# Patient Record
Sex: MALE | Race: WHITE | NOT HISPANIC OR LATINO | Employment: OTHER | URBAN - METROPOLITAN AREA
[De-identification: names, ages, dates, MRNs, and addresses within clinical notes are randomized per-mention and may not be internally consistent; named-entity substitution may affect disease eponyms.]

---

## 2017-01-25 ENCOUNTER — HOSPITAL ENCOUNTER (OUTPATIENT)
Dept: RADIOLOGY | Facility: MEDICAL CENTER | Age: 77
End: 2017-01-25
Attending: NURSE PRACTITIONER
Payer: MEDICARE

## 2017-01-25 DIAGNOSIS — M54.15 RADICULOPATHY OF THORACOLUMBAR REGION: ICD-10-CM

## 2017-01-25 DIAGNOSIS — M54.16 LUMBAR RADICULOPATHY: ICD-10-CM

## 2017-01-25 PROCEDURE — 72131 CT LUMBAR SPINE W/O DYE: CPT

## 2017-01-25 PROCEDURE — 72110 X-RAY EXAM L-2 SPINE 4/>VWS: CPT

## 2017-11-20 ENCOUNTER — OFFICE VISIT (OUTPATIENT)
Dept: URGENT CARE | Facility: PHYSICIAN GROUP | Age: 77
End: 2017-11-20
Payer: MEDICARE

## 2017-11-20 VITALS
OXYGEN SATURATION: 94 % | HEIGHT: 68 IN | HEART RATE: 84 BPM | BODY MASS INDEX: 32.52 KG/M2 | DIASTOLIC BLOOD PRESSURE: 60 MMHG | WEIGHT: 214.6 LBS | TEMPERATURE: 98.4 F | SYSTOLIC BLOOD PRESSURE: 122 MMHG

## 2017-11-20 DIAGNOSIS — J20.8 ACUTE BACTERIAL BRONCHITIS: ICD-10-CM

## 2017-11-20 DIAGNOSIS — J98.8 RTI (RESPIRATORY TRACT INFECTION): ICD-10-CM

## 2017-11-20 DIAGNOSIS — B96.89 ACUTE BACTERIAL BRONCHITIS: ICD-10-CM

## 2017-11-20 DIAGNOSIS — I10 HYPERTENSION, UNSPECIFIED TYPE: ICD-10-CM

## 2017-11-20 PROCEDURE — 99203 OFFICE O/P NEW LOW 30 MIN: CPT | Performed by: FAMILY MEDICINE

## 2017-11-20 RX ORDER — BENZONATATE 100 MG/1
200 CAPSULE ORAL 3 TIMES DAILY PRN
Qty: 30 CAP | Refills: 1 | Status: SHIPPED | OUTPATIENT
Start: 2017-11-20 | End: 2018-08-21

## 2017-11-20 RX ORDER — BENZONATATE 100 MG/1
200 CAPSULE ORAL 3 TIMES DAILY PRN
Qty: 30 CAP | Refills: 1 | Status: SHIPPED | OUTPATIENT
Start: 2017-11-20 | End: 2017-11-20 | Stop reason: SDUPTHER

## 2017-11-20 RX ORDER — PREDNISONE 10 MG/1
30 TABLET ORAL EVERY MORNING
Qty: 21 TAB | Refills: 0 | Status: SHIPPED | OUTPATIENT
Start: 2017-11-20 | End: 2017-11-20 | Stop reason: SDUPTHER

## 2017-11-20 RX ORDER — AMOXICILLIN 875 MG/1
875 TABLET, COATED ORAL EVERY 12 HOURS
Qty: 14 TAB | Refills: 0 | Status: SHIPPED | OUTPATIENT
Start: 2017-11-20 | End: 2017-11-27

## 2017-11-20 RX ORDER — PREDNISONE 10 MG/1
30 TABLET ORAL EVERY MORNING
Qty: 21 TAB | Refills: 0 | Status: SHIPPED | OUTPATIENT
Start: 2017-11-20 | End: 2017-11-27

## 2017-11-20 RX ORDER — AMOXICILLIN 875 MG/1
875 TABLET, COATED ORAL EVERY 12 HOURS
Qty: 14 TAB | Refills: 0 | Status: SHIPPED | OUTPATIENT
Start: 2017-11-20 | End: 2017-11-20 | Stop reason: SDUPTHER

## 2017-11-20 ASSESSMENT — ENCOUNTER SYMPTOMS
DIZZINESS: 0
FEVER: 0
COUGH: 1
CHILLS: 0
ORTHOPNEA: 0
FOCAL WEAKNESS: 0
SINUS PAIN: 1
SPUTUM PRODUCTION: 0
SORE THROAT: 1

## 2017-11-20 NOTE — PROGRESS NOTES
Subjective:      Amarjit Khan is a 77 y.o. male who presents with Sore Throat (chest congestion, sinus, ear pain, cough, x2 days)    Chief Complaint   Patient presents with   • Sore Throat     chest congestion, sinus, ear pain, cough, x2 days        - This is a very pleasant 77 y.o. male with complaints of chest congestion/cough w/ sputum x 4 days. No NVFC  otc meds not helping    - hx htn, stable on current meds          ALLERGIES:  Codeine; Darvocet [propoxyphene n-apap]; Nucynta [tapentadol]; Percocet [oxycodone-acetaminophen]; Clindamycin; Demerol; Fentanyl; Hydrocodone; Lyrica; Morphine sulfate [bupropion]; and Tape     PMH:  Past Medical History:   Diagnosis Date   • Acute MI 1997    cardiologist, Dr. Hernández   • Arthritis    • Cancer (CMS-HCC)     skin; basal cell facial   • High cholesterol    • Hyperlipidemia    • Hypertension    • Pain     low back   • Personal history of venous thrombosis and embolism 2004    left arm   • Rectal abscess    • Rheumatic fever as child   • Sleep apnea     CPAP   • Snoring    • Unspecified cataract     surgical correction bilateral        MEDS:    Current Outpatient Prescriptions:   •  GABAPENTIN PO, Take  by mouth., Disp: , Rfl:   •  amoxicillin (AMOXIL) 875 MG tablet, Take 1 Tab by mouth every 12 hours for 7 days., Disp: 14 Tab, Rfl: 0  •  predniSONE (DELTASONE) 10 MG Tab, Take 3 Tabs by mouth every morning for 7 days., Disp: 21 Tab, Rfl: 0  •  benzonatate (TESSALON) 100 MG Cap, Take 2 Caps by mouth 3 times a day as needed for Cough., Disp: 30 Cap, Rfl: 1  •  vitamin e (VITAMIN E) 400 UNIT CAPS, Take 400 Units by mouth every day., Disp: , Rfl:   •  ezetimibe-simvastatin (VYTORIN) 10-80 MG per tablet, Take 1 Tab by mouth every evening. Takes 1/2 tablet every evening, Disp: , Rfl:   •  meloxicam (MOBIC) 7.5 MG TABS, Take 7.5 mg by mouth every day., Disp: , Rfl:   •  lisinopril (PRINIVIL) 20 MG TABS, Take 20 mg by mouth every morning., Disp: , Rfl:   •  metoprolol SR  "(TOPROL XL) 25 MG TB24, Take 1 Tab by mouth every day. (Patient taking differently: Take 12.5 mg by mouth 2 Times a Day.), Disp: 30 Tab, Rfl: 0  •  Cholecalciferol (VITAMIN D) 2000 UNIT CAPS, Take 2,000 Units by mouth every day., Disp: , Rfl:     ** I have documented what I find to be significant in regards to past medical, social, family and surgical history  in my HPI or under PMH/PSH/FH review section, otherwise it is contributory **           HPI    Review of Systems   Constitutional: Negative for chills and fever.   HENT: Positive for congestion, ear pain, sinus pain and sore throat.    Respiratory: Positive for cough. Negative for sputum production.    Cardiovascular: Negative for chest pain and orthopnea.   Neurological: Negative for dizziness and focal weakness.          Objective:     /60   Pulse 84   Temp 36.9 °C (98.4 °F)   Ht 1.727 m (5' 8\")   Wt 97.3 kg (214 lb 9.6 oz)   SpO2 94%   BMI 32.63 kg/m²      Physical Exam   Constitutional: He appears well-developed. No distress.   HENT:   Head: Normocephalic and atraumatic.   Mouth/Throat: Oropharynx is clear and moist.   Eyes: Conjunctivae are normal.   Neck: Neck supple.   Cardiovascular: Regular rhythm.    No murmur heard.  Pulmonary/Chest: Effort normal and breath sounds normal. No respiratory distress.   Neurological: He is alert. He exhibits normal muscle tone.   Skin: Skin is warm and dry.   Psychiatric: He has a normal mood and affect. Judgment normal.   Nursing note and vitals reviewed.              Assessment/Plan:         1. RTI (respiratory tract infection)     2. Acute bacterial bronchitis  amoxicillin (AMOXIL) 875 MG tablet    predniSONE (DELTASONE) 10 MG Tab    benzonatate (TESSALON) 100 MG Cap   3. Hypertension, unspecified type               Dx & d/c instructions discussed w/ patient and/or family members. Follow up w/ Prvt Dr or here in 3-4 days if not getting better, sooner if needed,  ER if worse and UC/PCP unavailable.  "       Possible side effects (i.e. Rash, GI upset/constipation, sedation, elevation of BP or sugars) of any medications given discussed.

## 2018-01-16 ENCOUNTER — HOSPITAL ENCOUNTER (OUTPATIENT)
Dept: HOSPITAL 8 - CFH | Age: 78
Discharge: HOME | End: 2018-01-16
Attending: PHYSICIAN ASSISTANT
Payer: MEDICARE

## 2018-01-16 DIAGNOSIS — I25.10: Primary | ICD-10-CM

## 2018-01-16 DIAGNOSIS — I25.2: ICD-10-CM

## 2018-01-16 DIAGNOSIS — I10: ICD-10-CM

## 2018-01-16 DIAGNOSIS — E78.5: ICD-10-CM

## 2018-01-16 DIAGNOSIS — I11.9: ICD-10-CM

## 2018-01-16 DIAGNOSIS — I51.7: ICD-10-CM

## 2018-01-16 PROCEDURE — 93306 TTE W/DOPPLER COMPLETE: CPT

## 2018-01-18 ENCOUNTER — HOSPITAL ENCOUNTER (OUTPATIENT)
Dept: LAB | Facility: MEDICAL CENTER | Age: 78
End: 2018-01-18
Attending: ORTHOPAEDIC SURGERY
Payer: MEDICARE

## 2018-01-18 LAB
BASOPHILS # BLD AUTO: 1.5 % (ref 0–1.8)
BASOPHILS # BLD: 0.11 K/UL (ref 0–0.12)
CRP SERPL HS-MCNC: 0.29 MG/DL (ref 0–0.75)
EOSINOPHIL # BLD AUTO: 0.36 K/UL (ref 0–0.51)
EOSINOPHIL NFR BLD: 5.1 % (ref 0–6.9)
ERYTHROCYTE [DISTWIDTH] IN BLOOD BY AUTOMATED COUNT: 44.5 FL (ref 35.9–50)
ERYTHROCYTE [SEDIMENTATION RATE] IN BLOOD BY WESTERGREN METHOD: 17 MM/HOUR (ref 0–20)
HCT VFR BLD AUTO: 45.1 % (ref 42–52)
HGB BLD-MCNC: 15.9 G/DL (ref 14–18)
IMM GRANULOCYTES # BLD AUTO: 0.02 K/UL (ref 0–0.11)
IMM GRANULOCYTES NFR BLD AUTO: 0.3 % (ref 0–0.9)
LYMPHOCYTES # BLD AUTO: 2.12 K/UL (ref 1–4.8)
LYMPHOCYTES NFR BLD: 29.8 % (ref 22–41)
MCH RBC QN AUTO: 32.9 PG (ref 27–33)
MCHC RBC AUTO-ENTMCNC: 35.3 G/DL (ref 33.7–35.3)
MCV RBC AUTO: 93.2 FL (ref 81.4–97.8)
MONOCYTES # BLD AUTO: 0.44 K/UL (ref 0–0.85)
MONOCYTES NFR BLD AUTO: 6.2 % (ref 0–13.4)
NEUTROPHILS # BLD AUTO: 4.06 K/UL (ref 1.82–7.42)
NEUTROPHILS NFR BLD: 57.1 % (ref 44–72)
NRBC # BLD AUTO: 0 K/UL
NRBC BLD-RTO: 0 /100 WBC
PLATELET # BLD AUTO: 214 K/UL (ref 164–446)
PMV BLD AUTO: 11.5 FL (ref 9–12.9)
RBC # BLD AUTO: 4.84 M/UL (ref 4.7–6.1)
RHEUMATOID FACT SER IA-ACNC: 12 IU/ML (ref 0–14)
URATE SERPL-MCNC: 8.4 MG/DL (ref 2.5–8.3)
WBC # BLD AUTO: 7.1 K/UL (ref 4.8–10.8)

## 2018-01-18 PROCEDURE — 86140 C-REACTIVE PROTEIN: CPT

## 2018-01-18 PROCEDURE — 86038 ANTINUCLEAR ANTIBODIES: CPT

## 2018-01-18 PROCEDURE — 85025 COMPLETE CBC W/AUTO DIFF WBC: CPT

## 2018-01-18 PROCEDURE — 86431 RHEUMATOID FACTOR QUANT: CPT

## 2018-01-18 PROCEDURE — 86812 HLA TYPING A B OR C: CPT

## 2018-01-18 PROCEDURE — 85652 RBC SED RATE AUTOMATED: CPT

## 2018-01-18 PROCEDURE — 36415 COLL VENOUS BLD VENIPUNCTURE: CPT

## 2018-01-18 PROCEDURE — 84550 ASSAY OF BLOOD/URIC ACID: CPT

## 2018-01-18 PROCEDURE — 86200 CCP ANTIBODY: CPT

## 2018-01-19 LAB — HLA-B27 QL FC: NEGATIVE

## 2018-01-21 LAB
CCP IGG SERPL-ACNC: 4 UNITS (ref 0–19)
NUCLEAR IGG SER QL IA: NORMAL

## 2018-03-11 ENCOUNTER — OFFICE VISIT (OUTPATIENT)
Dept: URGENT CARE | Facility: PHYSICIAN GROUP | Age: 78
End: 2018-03-11
Payer: MEDICARE

## 2018-03-11 VITALS
HEIGHT: 68 IN | HEART RATE: 88 BPM | TEMPERATURE: 97.9 F | BODY MASS INDEX: 32.43 KG/M2 | RESPIRATION RATE: 14 BRPM | WEIGHT: 214 LBS | SYSTOLIC BLOOD PRESSURE: 128 MMHG | DIASTOLIC BLOOD PRESSURE: 84 MMHG | OXYGEN SATURATION: 97 %

## 2018-03-11 DIAGNOSIS — L50.9 URTICARIA: ICD-10-CM

## 2018-03-11 DIAGNOSIS — M79.89 SWELLING OF BOTH HANDS: ICD-10-CM

## 2018-03-11 PROCEDURE — 99214 OFFICE O/P EST MOD 30 MIN: CPT | Performed by: NURSE PRACTITIONER

## 2018-03-11 RX ORDER — PREDNISONE 20 MG/1
20 TABLET ORAL 2 TIMES DAILY
Qty: 10 TAB | Refills: 0 | Status: SHIPPED | OUTPATIENT
Start: 2018-03-11 | End: 2018-03-16

## 2018-03-11 NOTE — PROGRESS NOTES
"Subjective:      Amarjit Khan is a 77 y.o. male who presents with Hand Swelling (x1wks itchiness)            Patient comes in today with a month long history of bilateral hand swelling and itching and red neck rash.  He initially saw a provider who felt it was gout flare.  He was taking allopurinol with no relief.  He was evaluated at [facility] [place] ED last week and told to discontinue the allopurinol as possible allergic reaction.  He was prescribed a course of steroids which resolved the symptoms.  He thinks he accidentally took one of his allopurinol 4 days ago and symptoms returned.  He notes single red, pruritic urticaria patch on the back of the neck, and bilateral hand swelling with itching without rash.  He has tried Benadryl with no relief.  He is scheduled to see a dermatlogist in 4 days.  No chest pain, shortness of breath, swelling of the tongue, lips, or eyes.  He has a history of multiple drug allergies.          Review of Systems   Constitutional: Negative for chills, diaphoresis, fever and malaise/fatigue.   Respiratory: Negative for cough and shortness of breath.    Cardiovascular: Negative for chest pain.   Musculoskeletal: Negative for joint pain.   Skin: Positive for itching and rash.   Neurological: Negative for tingling, sensory change, focal weakness and weakness.     Medications, Allergies, and current problem list reviewed today in Epic     Objective:     /84   Pulse 88   Temp 36.6 °C (97.9 °F)   Resp 14   Ht 1.727 m (5' 8\")   Wt 97.1 kg (214 lb)   SpO2 97%   BMI 32.54 kg/m²      Physical Exam   Constitutional: He is oriented to person, place, and time. He appears well-developed and well-nourished. No distress.   HENT:   No angioedema.     Neck: Neck supple. No JVD present. No tracheal deviation present. No thyromegaly present.       Erythematous 3 inch irregularly round patch on the posterior neck with appearance of urticaria.  Somewhat thickened skin at site.  Pruritic.  No TTP.  " "No vesicles.  No fluctuance.     Cardiovascular: Normal rate, regular rhythm and normal heart sounds.  Exam reveals no gallop and no friction rub.    No murmur heard.  Pulmonary/Chest: Effort normal and breath sounds normal. No stridor. No respiratory distress. He has no wheezes. He has no rales. He exhibits no tenderness.   Musculoskeletal:   Bilateral generalized 2+ hand and finger swelling.  No erythema, bruising, rash, or lesion.  No TTP.  Generally pruritic.  ROM intact but \"feels tight\" per patient.  NV intact.  Sensation intact.  Cap refill < 2 seconds.     Lymphadenopathy:     He has no cervical adenopathy.   Neurological: He is alert and oriented to person, place, and time.   Skin: Skin is warm and dry. He is not diaphoretic. No erythema. No pallor.   Vitals reviewed.              Assessment/Plan:     1. Swelling of both hands  - predniSONE (DELTASONE) 20 MG Tab; Take 1 Tab by mouth 2 times a day for 5 days.  Dispense: 10 Tab; Refill: 0    2. Urticaria  - predniSONE (DELTASONE) 20 MG Tab; Take 1 Tab by mouth 2 times a day for 5 days.  Dispense: 10 Tab; Refill: 0    Discussed exam findings with patient.  Unknown etiology.  Cannot exclude drug allergy.   Prednisone as prescribed.  Follow up with dermatology as referred.  Follow up with PCP if symptoms persist without improvement.  ED precautions reviewed.  Patient verbalized understanding of and agreed with plan of care.    "

## 2018-03-12 ASSESSMENT — ENCOUNTER SYMPTOMS
DIAPHORESIS: 0
COUGH: 0
SHORTNESS OF BREATH: 0
CHILLS: 0
WEAKNESS: 0
FEVER: 0
SENSORY CHANGE: 0
TINGLING: 0
FOCAL WEAKNESS: 0

## 2018-03-14 ENCOUNTER — APPOINTMENT (RX ONLY)
Dept: URBAN - METROPOLITAN AREA CLINIC 4 | Facility: CLINIC | Age: 78
Setting detail: DERMATOLOGY
End: 2018-03-14

## 2018-03-14 DIAGNOSIS — L82.1 OTHER SEBORRHEIC KERATOSIS: ICD-10-CM

## 2018-03-14 DIAGNOSIS — L82.0 INFLAMED SEBORRHEIC KERATOSIS: ICD-10-CM

## 2018-03-14 DIAGNOSIS — Z85.828 PERSONAL HISTORY OF OTHER MALIGNANT NEOPLASM OF SKIN: ICD-10-CM

## 2018-03-14 DIAGNOSIS — T78.40 ALLERGY, UNSPECIFIED: ICD-10-CM

## 2018-03-14 DIAGNOSIS — L81.4 OTHER MELANIN HYPERPIGMENTATION: ICD-10-CM

## 2018-03-14 DIAGNOSIS — D22 MELANOCYTIC NEVI: ICD-10-CM

## 2018-03-14 DIAGNOSIS — D18.0 HEMANGIOMA: ICD-10-CM

## 2018-03-14 DIAGNOSIS — L57.8 OTHER SKIN CHANGES DUE TO CHRONIC EXPOSURE TO NONIONIZING RADIATION: ICD-10-CM

## 2018-03-14 PROBLEM — T78.40XA ALLERGY, UNSPECIFIED, INITIAL ENCOUNTER: Status: ACTIVE | Noted: 2018-03-14

## 2018-03-14 PROBLEM — D48.5 NEOPLASM OF UNCERTAIN BEHAVIOR OF SKIN: Status: ACTIVE | Noted: 2018-03-14

## 2018-03-14 PROBLEM — D18.01 HEMANGIOMA OF SKIN AND SUBCUTANEOUS TISSUE: Status: ACTIVE | Noted: 2018-03-14

## 2018-03-14 PROBLEM — D22.5 MELANOCYTIC NEVI OF TRUNK: Status: ACTIVE | Noted: 2018-03-14

## 2018-03-14 PROCEDURE — 17110 DESTRUCTION B9 LES UP TO 14: CPT

## 2018-03-14 PROCEDURE — ? COUNSELING

## 2018-03-14 PROCEDURE — ? PRESCRIPTION

## 2018-03-14 PROCEDURE — ? BIOPSY BY SHAVE METHOD

## 2018-03-14 PROCEDURE — 11100: CPT | Mod: 59

## 2018-03-14 PROCEDURE — 99213 OFFICE O/P EST LOW 20 MIN: CPT | Mod: 25

## 2018-03-14 PROCEDURE — ? LIQUID NITROGEN

## 2018-03-14 PROCEDURE — 11101: CPT

## 2018-03-14 RX ORDER — TRIAMCINOLONE ACETONIDE 1 MG/G
OINTMENT TOPICAL
Qty: 1 | Refills: 3 | Status: ERX | COMMUNITY
Start: 2018-03-14

## 2018-03-14 RX ADMIN — TRIAMCINOLONE ACETONIDE 1: 1 OINTMENT TOPICAL at 00:00

## 2018-03-14 ASSESSMENT — LOCATION DETAILED DESCRIPTION DERM
LOCATION DETAILED: RIGHT ULNAR DORSAL HAND
LOCATION DETAILED: RIGHT INFERIOR CENTRAL MALAR CHEEK
LOCATION DETAILED: RIGHT SUPERIOR MEDIAL UPPER BACK
LOCATION DETAILED: RIGHT LATERAL UPPER BACK
LOCATION DETAILED: LEFT ULNAR DORSAL HAND
LOCATION DETAILED: LEFT MID-UPPER BACK
LOCATION DETAILED: LEFT SUPERIOR MEDIAL UPPER BACK
LOCATION DETAILED: RIGHT MID-UPPER BACK
LOCATION DETAILED: RIGHT SUPERIOR UPPER BACK
LOCATION DETAILED: LEFT SUPERIOR ANTERIOR NECK

## 2018-03-14 ASSESSMENT — LOCATION SIMPLE DESCRIPTION DERM
LOCATION SIMPLE: RIGHT UPPER BACK
LOCATION SIMPLE: RIGHT CHEEK
LOCATION SIMPLE: LEFT UPPER BACK
LOCATION SIMPLE: LEFT HAND
LOCATION SIMPLE: LEFT ANTERIOR NECK
LOCATION SIMPLE: RIGHT HAND

## 2018-03-14 ASSESSMENT — LOCATION ZONE DERM
LOCATION ZONE: NECK
LOCATION ZONE: HAND
LOCATION ZONE: FACE
LOCATION ZONE: TRUNK

## 2018-03-14 NOTE — PROCEDURE: BIOPSY BY SHAVE METHOD
Electrodesiccation And Curettage Text: The wound bed was treated with electrodesiccation and curettage after the biopsy was performed.
Billing Type: Third-Party Bill
Biopsy Type: H and E
Type Of Destruction Used: Electrodesiccation
Lab Facility: 
Biopsy Method: Dermablade
Silver Nitrate Text: The wound bed was treated with silver nitrate after the biopsy was performed.
Lab: [id number]
Notification Instructions: Patient will be notified of biopsy results. However, patient instructed to call the office if not contacted within 2 weeks.
X Size Of Lesion In Cm: 0
Post-Care Instructions: I reviewed with the patient in detail post-care instructions. Patient is to keep the biopsy site dry overnight, and then apply bacitracin twice daily until healed. Patient may apply hydrogen peroxide soaks to remove any crusting.
Render Post-Care Instructions In Note?: no
Electrodesiccation Text: The wound bed was treated with electrodesiccation after the biopsy was performed.
Curettage Text: The wound bed was treated with curettage after the biopsy was performed.
Consent: Written consent was obtained and risks were reviewed including but not limited to scarring, infection, bleeding, scabbing, incomplete removal, nerve damage and allergy to anesthesia.
Anesthesia Type: 1% lidocaine with epinephrine
Anesthesia Volume In Cc: 0.5
Wound Care: Bacitracin
Detail Level: Detailed
Cryotherapy Text: The wound bed was treated with cryotherapy after the biopsy was performed.
Hemostasis: Drysol
Dressing: bandage

## 2018-03-14 NOTE — PROCEDURE: LIQUID NITROGEN
Medical Necessity Information: It is in your best interest to select a reason for this procedure from the list below. All of these items fulfill various CMS LCD requirements except the new and changing color options.
Post-Care Instructions: I reviewed with the patient in detail post-care instructions. Patient is to wear sunprotection, and avoid picking at any of the treated lesions. Pt may apply Vaseline to crusted or scabbing areas.
Number Of Freeze-Thaw Cycles: 1 freeze-thaw cycle
Aperture Size (Optional): C
Add 52 Modifier (Optional): no
Consent: The patient's consent was obtained including but not limited to risks of crusting, scabbing, blistering, scarring, darker or lighter pigmentary change, recurrence, incomplete removal and infection.
Medical Necessity Clause: This procedure was medically necessary because the lesions that were treated were:
Duration Of Freeze Thaw-Cycle (Seconds): 3
Detail Level: Detailed

## 2018-03-19 ENCOUNTER — HOSPITAL ENCOUNTER (OUTPATIENT)
Dept: HOSPITAL 8 - CFH | Age: 78
Discharge: HOME | End: 2018-03-19
Attending: PHYSICIAN ASSISTANT
Payer: MEDICARE

## 2018-03-19 DIAGNOSIS — I12.9: Primary | ICD-10-CM

## 2018-03-19 DIAGNOSIS — N18.3: ICD-10-CM

## 2018-03-19 DIAGNOSIS — I25.10: ICD-10-CM

## 2018-03-19 LAB
ALBUMIN SERPL-MCNC: 3.7 G/DL (ref 3.4–5)
ALP SERPL-CCNC: 62 U/L (ref 45–117)
ALT SERPL-CCNC: 45 U/L (ref 12–78)
ANION GAP SERPL CALC-SCNC: 7 MMOL/L (ref 5–15)
BILIRUB SERPL-MCNC: 1.8 MG/DL (ref 0.2–1)
CALCIUM SERPL-MCNC: 9.1 MG/DL (ref 8.5–10.1)
CHLORIDE SERPL-SCNC: 105 MMOL/L (ref 98–107)
CHOL/HDL RATIO: 3.8
CREAT SERPL-MCNC: 1.24 MG/DL (ref 0.7–1.3)
HDL CHOL %: 26 % (ref 26–37)
HDL CHOLESTEROL (DIRECT): 31 MG/DL (ref 40–60)
LDL CHOLESTEROL,CALCULATED: 59 MG/DL (ref 54–169)
LDLC/HDLC SERPL: 1.9 {RATIO} (ref 0.5–3)
PROT SERPL-MCNC: 7.4 G/DL (ref 6.4–8.2)
TRIGL SERPL-MCNC: 133 MG/DL (ref 50–200)
VLDLC SERPL CALC-MCNC: 27 MG/DL (ref 0–25)

## 2018-03-19 PROCEDURE — 80053 COMPREHEN METABOLIC PANEL: CPT

## 2018-03-19 PROCEDURE — 80061 LIPID PANEL: CPT

## 2018-03-19 PROCEDURE — 36415 COLL VENOUS BLD VENIPUNCTURE: CPT

## 2018-03-27 ENCOUNTER — APPOINTMENT (RX ONLY)
Dept: URBAN - METROPOLITAN AREA CLINIC 4 | Facility: CLINIC | Age: 78
Setting detail: DERMATOLOGY
End: 2018-03-27

## 2018-03-27 PROBLEM — C44.519 BASAL CELL CARCINOMA OF SKIN OF OTHER PART OF TRUNK: Status: ACTIVE | Noted: 2018-03-27

## 2018-03-27 PROCEDURE — 13101 CMPLX RPR TRUNK 2.6-7.5 CM: CPT

## 2018-03-27 PROCEDURE — 11603 EXC TR-EXT MAL+MARG 2.1-3 CM: CPT

## 2018-03-27 PROCEDURE — ? EXCISION

## 2018-04-06 ENCOUNTER — APPOINTMENT (RX ONLY)
Dept: URBAN - METROPOLITAN AREA CLINIC 4 | Facility: CLINIC | Age: 78
Setting detail: DERMATOLOGY
End: 2018-04-06

## 2018-04-06 PROBLEM — C44.519 BASAL CELL CARCINOMA OF SKIN OF OTHER PART OF TRUNK: Status: ACTIVE | Noted: 2018-04-06

## 2018-04-06 PROCEDURE — ? EXCISION

## 2018-04-06 PROCEDURE — 13101 CMPLX RPR TRUNK 2.6-7.5 CM: CPT | Mod: 79

## 2018-04-06 PROCEDURE — 11603 EXC TR-EXT MAL+MARG 2.1-3 CM: CPT | Mod: 79

## 2018-04-06 NOTE — PROCEDURE: EXCISION
Previous Accession (Optional): [id number]
Curvilinear Excision Additional Text (Leave Blank If You Do Not Want): The margin was drawn around the clinically apparent lesion.  A curvilinear shape was then drawn on the skin incorporating the lesion and margins.  Incisions were then made along these lines to the appropriate tissue plane and the lesion was extirpated.
Detail Level: Detailed
Excision Method: Elliptical
Dermal Autograft Text: The defect edges were debeveled with a #15 scalpel blade.  Given the location of the defect, shape of the defect and the proximity to free margins a dermal autograft was deemed most appropriate.  Using a sterile surgical marker, the primary defect shape was transferred to the donor site. The area thus outlined was incised deep to adipose tissue with a #15 scalpel blade.  The harvested graft was then trimmed of adipose and epidermal tissue until only dermis was left.  The skin graft was then placed in the primary defect and oriented appropriately.
Anesthesia Volume In Cc: 12
Render Post-Care Instructions In Note?: yes
Positioning (Leave Blank If You Do Not Want): The patient was placed in a comfortable position exposing the surgical site.
Graft Donor Site Bandage (Optional-Leave Blank If You Don't Want In Note): Steri-strips and a pressure bandage were applied to the donor site.
Fusiform Excision Additional Text (Leave Blank If You Do Not Want): The margin was drawn around the clinically apparent lesion.  A fusiform shape was then drawn on the skin incorporating the lesion and margins.  Incisions were then made along these lines to the appropriate tissue plane and the lesion was extirpated.
Bilateral Helical Rim Advancement Flap Text: The defect edges were debeveled with a #15 blade scalpel.  Given the location of the defect and the proximity to free margins (helical rim) a bilateral helical rim advancement flap was deemed most appropriate.  Using a sterile surgical marker, the appropriate advancement flaps were drawn incorporating the defect and placing the expected incisions between the helical rim and antihelix where possible.  The area thus outlined was incised through and through with a #15 scalpel blade.  With a skin hook and iris scissors, the flaps were gently and sharply undermined and freed up.
Tissue Cultured Epidermal Autograft Text: The defect edges were debeveled with a #15 scalpel blade.  Given the location of the defect, shape of the defect and the proximity to free margins a tissue cultured epidermal autograft was deemed most appropriate.  The graft was then trimmed to fit the size of the defect.  The graft was then placed in the primary defect and oriented appropriately.
Complex Repair And M Plasty Text: The defect edges were debeveled with a #15 scalpel blade.  The primary defect was closed partially with a complex linear closure.  Given the location of the remaining defect, shape of the defect and the proximity to free margins an M plasty was deemed most appropriate for complete closure of the defect.  Using a sterile surgical marker, an appropriate advancement flap was drawn incorporating the defect and placing the expected incisions within the relaxed skin tension lines where possible.    The area thus outlined was incised deep to adipose tissue with a #15 scalpel blade.  The skin margins were undermined to an appropriate distance in all directions utilizing iris scissors.
Lab: [id number]
Crescentic Complex Repair Preamble Text (Leave Blank If You Do Not Want): Extensive wide undermining was performed.
Xenograft Text: The defect edges were debeveled with a #15 scalpel blade.  Given the location of the defect, shape of the defect and the proximity to free margins a xenograft was deemed most appropriate.  The graft was then trimmed to fit the size of the defect.  The graft was then placed in the primary defect and oriented appropriately.
Lip Wedge Excision Repair Text: Given the location of the defect and the proximity to free margins a full thickness wedge repair was deemed most appropriate.  Using a sterile surgical marker, the appropriate repair was drawn incorporating the defect and placing the expected incisions perpendicular to the vermilion border.  The vermilion border was also meticulously outlined to ensure appropriate reapproximation during the repair.  The area thus outlined was incised through and through with a #15 scalpel blade.  The muscularis and dermis were reaproximated with deep sutures following hemostasis. Care was taken to realign the vermilion border before proceeding with the superficial closure.  Once the vermilion was realigned the superfical and mucosal closure was finished.
Undermining Location (Optional): in the superficial subcutaneous fat
Render Path Notes In Note?: no
Rotation Flap Text: The defect edges were debeveled with a #15 scalpel blade.  Given the location of the defect, shape of the defect and the proximity to free margins a rotation flap was deemed most appropriate.  Using a sterile surgical marker, an appropriate rotation flap was drawn incorporating the defect and placing the expected incisions within the relaxed skin tension lines where possible.    The area thus outlined was incised deep to adipose tissue with a #15 scalpel blade.  The skin margins were undermined to an appropriate distance in all directions utilizing iris scissors.
W Plasty Text: The lesion was extirpated to the level of the fat with a #15 scalpel blade.  Given the location of the defect, shape of the defect and the proximity to free margins a W-plasty was deemed most appropriate for repair.  Using a sterile surgical marker, the appropriate transposition arms of the W-plasty were drawn incorporating the defect and placing the expected incisions within the relaxed skin tension lines where possible.    The area thus outlined was incised deep to adipose tissue with a #15 scalpel blade.  The skin margins were undermined to an appropriate distance in all directions utilizing iris scissors.  The opposing transposition arms were then transposed into place in opposite direction and anchored with interrupted buried subcutaneous sutures.
Complex Repair And Ftsg Text: The defect edges were debeveled with a #15 scalpel blade.  The primary defect was closed partially with a complex linear closure.  Given the location of the defect, shape of the defect and the proximity to free margins a full thickness skin graft was deemed most appropriate to repair the remaining defect.  The graft was trimmed to fit the size of the remaining defect.  The graft was then placed in the primary defect, oriented appropriately, and sutured into place.
Complex Repair And Transposition Flap Text: The defect edges were debeveled with a #15 scalpel blade.  The primary defect was closed partially with a complex linear closure.  Given the location of the remaining defect, shape of the defect and the proximity to free margins a transposition flap was deemed most appropriate for complete closure of the defect.  Using a sterile surgical marker, an appropriate advancement flap was drawn incorporating the defect and placing the expected incisions within the relaxed skin tension lines where possible.    The area thus outlined was incised deep to adipose tissue with a #15 scalpel blade.  The skin margins were undermined to an appropriate distance in all directions utilizing iris scissors.
H Plasty Text: Given the location of the defect, shape of the defect and the proximity to free margins a H-plasty was deemed most appropriate for repair.  Using a sterile surgical marker, the appropriate advancement arms of the H-plasty were drawn incorporating the defect and placing the expected incisions within the relaxed skin tension lines where possible. The area thus outlined was incised deep to adipose tissue with a #15 scalpel blade. The skin margins were undermined to an appropriate distance in all directions utilizing iris scissors.  The opposing advancement arms were then advanced into place in opposite direction and anchored with interrupted buried subcutaneous sutures.
Complex Repair And A-T Advancement Flap Text: The defect edges were debeveled with a #15 scalpel blade.  The primary defect was closed partially with a complex linear closure.  Given the location of the remaining defect, shape of the defect and the proximity to free margins an A-T advancement flap was deemed most appropriate for complete closure of the defect.  Using a sterile surgical marker, an appropriate advancement flap was drawn incorporating the defect and placing the expected incisions within the relaxed skin tension lines where possible.    The area thus outlined was incised deep to adipose tissue with a #15 scalpel blade.  The skin margins were undermined to an appropriate distance in all directions utilizing iris scissors.
Complex Repair And Tissue Cultured Epidermal Autograft Text: The defect edges were debeveled with a #15 scalpel blade.  The primary defect was closed partially with a complex linear closure.  Given the location of the defect, shape of the defect and the proximity to free margins an tissue cultured epidermal autograft was deemed most appropriate to repair the remaining defect.  The graft was trimmed to fit the size of the remaining defect.  The graft was then placed in the primary defect, oriented appropriately, and sutured into place.
Second Skin Substitute Units (Will Override Primary Defect Units If Greater Than 0): 0
Perilesional Excision Additional Text (Leave Blank If You Do Not Want): The margin was drawn around the clinically apparent lesion. Incisions were then made along these lines to the appropriate tissue plane and the lesion was extirpated.
Cheek-To-Nose Interpolation Flap Text: A decision was made to reconstruct the defect utilizing an interpolation axial flap and a staged reconstruction.  A telfa template was made of the defect.  This telfa template was then used to outline the Cheek-To-Nose Interpolation flap.  The donor area for the pedicle flap was then injected with anesthesia.  The flap was excised through the skin and subcutaneous tissue down to the layer of the underlying musculature.  The interpolation flap was carefully excised within this deep plane to maintain its blood supply.  The edges of the donor site were undermined.   The donor site was closed in a primary fashion.  The pedicle was then rotated into position and sutured.  Once the tube was sutured into place, adequate blood supply was confirmed with blanching and refill.  The pedicle was then wrapped with xeroform gauze and dressed appropriately with a telfa and gauze bandage to ensure continued blood supply and protect the attached pedicle.
Excisional Biopsy Additional Text (Leave Blank If You Do Not Want): The margin was drawn around the clinically apparent lesion. An elliptical shape was then drawn on the skin incorporating the lesion and margins.  Incisions were then made along these lines to the appropriate tissue plane and the lesion was extirpated.
Interpolation Flap Text: A decision was made to reconstruct the defect utilizing an interpolation axial flap and a staged reconstruction.  A telfa template was made of the defect.  This telfa template was then used to outline the interpolation flap.  The donor area for the pedicle flap was then injected with anesthesia.  The flap was excised through the skin and subcutaneous tissue down to the layer of the underlying musculature.  The interpolation flap was carefully excised within this deep plane to maintain its blood supply.  The edges of the donor site were undermined.   The donor site was closed in a primary fashion.  The pedicle was then rotated into position and sutured.  Once the tube was sutured into place, adequate blood supply was confirmed with blanching and refill.  The pedicle was then wrapped with xeroform gauze and dressed appropriately with a telfa and gauze bandage to ensure continued blood supply and protect the attached pedicle.
Skin Substitute Text: The defect edges were debeveled with a #15 scalpel blade.  Given the location of the defect, shape of the defect and the proximity to free margins a skin substitute graft was deemed most appropriate.  The graft material was trimmed to fit the size of the defect. The graft was then placed in the primary defect and oriented appropriately.
Pre-Excision Curettage Text (Leave Blank If You Do Not Want): Prior to drawing the surgical margin the visible lesion was removed with electrodesiccation and curettage to clearly define the lesion size.
Star Wedge Flap Text: The defect edges were debeveled with a #15 scalpel blade.  Given the location of the defect, shape of the defect and the proximity to free margins a star wedge flap was deemed most appropriate.  Using a sterile surgical marker, an appropriate rotation flap was drawn incorporating the defect and placing the expected incisions within the relaxed skin tension lines where possible. The area thus outlined was incised deep to adipose tissue with a #15 scalpel blade.  The skin margins were undermined to an appropriate distance in all directions utilizing iris scissors.
Island Pedicle Flap-Requiring Vessel Identification Text: The defect edges were debeveled with a #15 scalpel blade.  Given the location of the defect, shape of the defect and the proximity to free margins an island pedicle advancement flap was deemed most appropriate.  Using a sterile surgical marker, an appropriate advancement flap was drawn, based on the axial vessel mentioned above, incorporating the defect, outlining the appropriate donor tissue and placing the expected incisions within the relaxed skin tension lines where possible.    The area thus outlined was incised deep to adipose tissue with a #15 scalpel blade.  The skin margins were undermined to an appropriate distance in all directions around the primary defect and laterally outward around the island pedicle utilizing iris scissors.  There was minimal undermining beneath the pedicle flap.
O-T Advancement Flap Text: The defect edges were debeveled with a #15 scalpel blade.  Given the location of the defect, shape of the defect and the proximity to free margins an O-T advancement flap was deemed most appropriate.  Using a sterile surgical marker, an appropriate advancement flap was drawn incorporating the defect and placing the expected incisions within the relaxed skin tension lines where possible.    The area thus outlined was incised deep to adipose tissue with a #15 scalpel blade.  The skin margins were undermined to an appropriate distance in all directions utilizing iris scissors.
Epidermal Closure Graft Donor Site (Optional): simple interrupted
Island Pedicle Flap Text: The defect edges were debeveled with a #15 scalpel blade.  Given the location of the defect, shape of the defect and the proximity to free margins an island pedicle advancement flap was deemed most appropriate.  Using a sterile surgical marker, an appropriate advancement flap was drawn incorporating the defect, outlining the appropriate donor tissue and placing the expected incisions within the relaxed skin tension lines where possible.    The area thus outlined was incised deep to adipose tissue with a #15 scalpel blade.  The skin margins were undermined to an appropriate distance in all directions around the primary defect and laterally outward around the island pedicle utilizing iris scissors.  There was minimal undermining beneath the pedicle flap.
Dressing: dry sterile dressing
Spiral Flap Text: The defect edges were debeveled with a #15 scalpel blade.  Given the location of the defect, shape of the defect and the proximity to free margins a spiral flap was deemed most appropriate.  Using a sterile surgical marker, an appropriate rotation flap was drawn incorporating the defect and placing the expected incisions within the relaxed skin tension lines where possible. The area thus outlined was incised deep to adipose tissue with a #15 scalpel blade.  The skin margins were undermined to an appropriate distance in all directions utilizing iris scissors.
Helical Rim Advancement Flap Text: The defect edges were debeveled with a #15 blade scalpel.  Given the location of the defect and the proximity to free margins (helical rim) a double helical rim advancement flap was deemed most appropriate.  Using a sterile surgical marker, the appropriate advancement flaps were drawn incorporating the defect and placing the expected incisions between the helical rim and antihelix where possible.  The area thus outlined was incised through and through with a #15 scalpel blade.  With a skin hook and iris scissors, the flaps were gently and sharply undermined and freed up.
Ear Star Wedge Flap Text: The defect edges were debeveled with a #15 blade scalpel.  Given the location of the defect and the proximity to free margins (helical rim) an ear star wedge flap was deemed most appropriate.  Using a sterile surgical marker, the appropriate flap was drawn incorporating the defect and placing the expected incisions between the helical rim and antihelix where possible.  The area thus outlined was incised through and through with a #15 scalpel blade.
Partial Purse String (Simple) Text: Given the location of the defect and the characteristics of the surrounding skin a simple purse string closure was deemed most appropriate.  Undermining was performed circumferentially around the surgical defect.  A purse string suture was then placed and tightened. Wound tension of the circular defect prevented complete closure of the wound.
Burow's Advancement Flap Text: The defect edges were debeveled with a #15 scalpel blade.  Given the location of the defect and the proximity to free margins a Burow's advancement flap was deemed most appropriate.  Using a sterile surgical marker, the appropriate advancement flap was drawn incorporating the defect and placing the expected incisions within the relaxed skin tension lines where possible.    The area thus outlined was incised deep to adipose tissue with a #15 scalpel blade.  The skin margins were undermined to an appropriate distance in all directions utilizing iris scissors.
Complex Repair And V-Y Plasty Text: The defect edges were debeveled with a #15 scalpel blade.  The primary defect was closed partially with a complex linear closure.  Given the location of the remaining defect, shape of the defect and the proximity to free margins a V-Y plasty was deemed most appropriate for complete closure of the defect.  Using a sterile surgical marker, an appropriate advancement flap was drawn incorporating the defect and placing the expected incisions within the relaxed skin tension lines where possible.    The area thus outlined was incised deep to adipose tissue with a #15 scalpel blade.  The skin margins were undermined to an appropriate distance in all directions utilizing iris scissors.
Modified Advancement Flap Text: The defect edges were debeveled with a #15 scalpel blade.  Given the location of the defect, shape of the defect and the proximity to free margins a modified advancement flap was deemed most appropriate.  Using a sterile surgical marker, an appropriate advancement flap was drawn incorporating the defect and placing the expected incisions within the relaxed skin tension lines where possible.    The area thus outlined was incised deep to adipose tissue with a #15 scalpel blade.  The skin margins were undermined to an appropriate distance in all directions utilizing iris scissors.
Intermediate / Complex Repair - Final Wound Length In Cm: 6.5
Elliptical Excision Additional Text (Leave Blank If You Do Not Want): The margin was drawn around the clinically apparent lesion.  An elliptical shape was then drawn on the skin incorporating the lesion and margins.  Incisions were then made along these lines to the appropriate tissue plane and the lesion was extirpated.
Transposition Flap Text: The defect edges were debeveled with a #15 scalpel blade.  Given the location of the defect and the proximity to free margins a transposition flap was deemed most appropriate.  Using a sterile surgical marker, an appropriate transposition flap was drawn incorporating the defect.    The area thus outlined was incised deep to adipose tissue with a #15 scalpel blade.  The skin margins were undermined to an appropriate distance in all directions utilizing iris scissors.
A-T Advancement Flap Text: The defect edges were debeveled with a #15 scalpel blade.  Given the location of the defect, shape of the defect and the proximity to free margins an A-T advancement flap was deemed most appropriate.  Using a sterile surgical marker, an appropriate advancement flap was drawn incorporating the defect and placing the expected incisions within the relaxed skin tension lines where possible.    The area thus outlined was incised deep to adipose tissue with a #15 scalpel blade.  The skin margins were undermined to an appropriate distance in all directions utilizing iris scissors.
Advancement Flap (Double) Text: The defect edges were debeveled with a #15 scalpel blade.  Given the location of the defect and the proximity to free margins a double advancement flap was deemed most appropriate.  Using a sterile surgical marker, the appropriate advancement flaps were drawn incorporating the defect and placing the expected incisions within the relaxed skin tension lines where possible.    The area thus outlined was incised deep to adipose tissue with a #15 scalpel blade.  The skin margins were undermined to an appropriate distance in all directions utilizing iris scissors.
Lab Facility: 
Z Plasty Text: The lesion was extirpated to the level of the fat with a #15 scalpel blade.  Given the location of the defect, shape of the defect and the proximity to free margins a Z-plasty was deemed most appropriate for repair.  Using a sterile surgical marker, the appropriate transposition arms of the Z-plasty were drawn incorporating the defect and placing the expected incisions within the relaxed skin tension lines where possible.    The area thus outlined was incised deep to adipose tissue with a #15 scalpel blade.  The skin margins were undermined to an appropriate distance in all directions utilizing iris scissors.  The opposing transposition arms were then transposed into place in opposite direction and anchored with interrupted buried subcutaneous sutures.
Bilobed Flap Text: The defect edges were debeveled with a #15 scalpel blade.  Given the location of the defect and the proximity to free margins a bilobe flap was deemed most appropriate.  Using a sterile surgical marker, an appropriate bilobe flap drawn around the defect.    The area thus outlined was incised deep to adipose tissue with a #15 scalpel blade.  The skin margins were undermined to an appropriate distance in all directions utilizing iris scissors.
Posterior Auricular Interpolation Flap Text: A decision was made to reconstruct the defect utilizing an interpolation axial flap and a staged reconstruction.  A telfa template was made of the defect.  This telfa template was then used to outline the posterior auricular interpolation flap.  The donor area for the pedicle flap was then injected with anesthesia.  The flap was excised through the skin and subcutaneous tissue down to the layer of the underlying musculature.  The pedicle flap was carefully excised within this deep plane to maintain its blood supply.  The edges of the donor site were undermined.   The donor site was closed in a primary fashion.  The pedicle was then rotated into position and sutured.  Once the tube was sutured into place, adequate blood supply was confirmed with blanching and refill.  The pedicle was then wrapped with xeroform gauze and dressed appropriately with a telfa and gauze bandage to ensure continued blood supply and protect the attached pedicle.
Island Pedicle Flap With Canthal Suspension Text: The defect edges were debeveled with a #15 scalpel blade.  Given the location of the defect, shape of the defect and the proximity to free margins an island pedicle advancement flap was deemed most appropriate.  Using a sterile surgical marker, an appropriate advancement flap was drawn incorporating the defect, outlining the appropriate donor tissue and placing the expected incisions within the relaxed skin tension lines where possible. The area thus outlined was incised deep to adipose tissue with a #15 scalpel blade.  The skin margins were undermined to an appropriate distance in all directions around the primary defect and laterally outward around the island pedicle utilizing iris scissors.  There was minimal undermining beneath the pedicle flap. A suspension suture was placed in the canthal tendon to prevent tension and prevent ectropion.
Split-Thickness Skin Graft Text: The defect edges were debeveled with a #15 scalpel blade.  Given the location of the defect, shape of the defect and the proximity to free margins a split thickness skin graft was deemed most appropriate.  Using a sterile surgical marker, the primary defect shape was transferred to the donor site. The split thickness graft was then harvested.  The skin graft was then placed in the primary defect and oriented appropriately.
Cheek Interpolation Flap Text: A decision was made to reconstruct the defect utilizing an interpolation axial flap and a staged reconstruction.  A telfa template was made of the defect.  This telfa template was then used to outline the Cheek Interpolation flap.  The donor area for the pedicle flap was then injected with anesthesia.  The flap was excised through the skin and subcutaneous tissue down to the layer of the underlying musculature.  The interpolation flap was carefully excised within this deep plane to maintain its blood supply.  The edges of the donor site were undermined.   The donor site was closed in a primary fashion.  The pedicle was then rotated into position and sutured.  Once the tube was sutured into place, adequate blood supply was confirmed with blanching and refill.  The pedicle was then wrapped with xeroform gauze and dressed appropriately with a telfa and gauze bandage to ensure continued blood supply and protect the attached pedicle.
Purse String (Intermediate) Text: Given the location of the defect and the characteristics of the surrounding skin a purse string intermediate closure was deemed most appropriate.  Undermining was performed circumfirentially around the surgical defect.  A purse string suture was then placed and tightened.
Estimated Blood Loss (Cc): minimal
Complex Repair And Xenograft Text: The defect edges were debeveled with a #15 scalpel blade.  The primary defect was closed partially with a complex linear closure.  Given the location of the defect, shape of the defect and the proximity to free margins a xenograft was deemed most appropriate to repair the remaining defect.  The graft was trimmed to fit the size of the remaining defect.  The graft was then placed in the primary defect, oriented appropriately, and sutured into place.
Complex Repair And Skin Substitute Graft Text: The defect edges were debeveled with a #15 scalpel blade.  The primary defect was closed partially with a complex linear closure.  Given the location of the remaining defect, shape of the defect and the proximity to free margins a skin substitute graft was deemed most appropriate to repair the remaining defect.  The graft was trimmed to fit the size of the remaining defect.  The graft was then placed in the primary defect, oriented appropriately, and sutured into place.
Partial Purse String (Intermediate) Text: Given the location of the defect and the characteristics of the surrounding skin an intermediate purse string closure was deemed most appropriate.  Undermining was performed circumferentially around the surgical defect.  A purse string suture was then placed and tightened. Wound tension of the circular defect prevented complete closure of the wound.
Complex Repair And O-T Advancement Flap Text: The defect edges were debeveled with a #15 scalpel blade.  The primary defect was closed partially with a complex linear closure.  Given the location of the remaining defect, shape of the defect and the proximity to free margins an O-T advancement flap was deemed most appropriate for complete closure of the defect.  Using a sterile surgical marker, an appropriate advancement flap was drawn incorporating the defect and placing the expected incisions within the relaxed skin tension lines where possible.    The area thus outlined was incised deep to adipose tissue with a #15 scalpel blade.  The skin margins were undermined to an appropriate distance in all directions utilizing iris scissors.
Surgeon Performing The Repair (Optional): Henry
Crescentic Advancement Flap Text: The defect edges were debeveled with a #15 scalpel blade.  Given the location of the defect and the proximity to free margins a crescentic advancement flap was deemed most appropriate.  Using a sterile surgical marker, the appropriate advancement flap was drawn incorporating the defect and placing the expected incisions within the relaxed skin tension lines where possible.    The area thus outlined was incised deep to adipose tissue with a #15 scalpel blade.  The skin margins were undermined to an appropriate distance in all directions utilizing iris scissors.
Complex Repair And Melolabial Flap Text: The defect edges were debeveled with a #15 scalpel blade.  The primary defect was closed partially with a complex linear closure.  Given the location of the remaining defect, shape of the defect and the proximity to free margins a melolabial flap was deemed most appropriate for complete closure of the defect.  Using a sterile surgical marker, an appropriate advancement flap was drawn incorporating the defect and placing the expected incisions within the relaxed skin tension lines where possible.    The area thus outlined was incised deep to adipose tissue with a #15 scalpel blade.  The skin margins were undermined to an appropriate distance in all directions utilizing iris scissors.
Complex Repair And Dorsal Nasal Flap Text: The defect edges were debeveled with a #15 scalpel blade.  The primary defect was closed partially with a complex linear closure.  Given the location of the remaining defect, shape of the defect and the proximity to free margins a dorsal nasal flap was deemed most appropriate for complete closure of the defect.  Using a sterile surgical marker, an appropriate flap was drawn incorporating the defect and placing the expected incisions within the relaxed skin tension lines where possible.    The area thus outlined was incised deep to adipose tissue with a #15 scalpel blade.  The skin margins were undermined to an appropriate distance in all directions utilizing iris scissors.
Alar Island Pedicle Flap Text: The defect edges were debeveled with a #15 scalpel blade.  Given the location of the defect, shape of the defect and the proximity to the alar rim an island pedicle advancement flap was deemed most appropriate.  Using a sterile surgical marker, an appropriate advancement flap was drawn incorporating the defect, outlining the appropriate donor tissue and placing the expected incisions within the nasal ala running parallel to the alar rim. The area thus outlined was incised with a #15 scalpel blade.  The skin margins were undermined minimally to an appropriate distance in all directions around the primary defect and laterally outward around the island pedicle utilizing iris scissors.  There was minimal undermining beneath the pedicle flap.
Bi-Rhombic Flap Text: The defect edges were debeveled with a #15 scalpel blade.  Given the location of the defect and the proximity to free margins a bi-rhombic flap was deemed most appropriate.  Using a sterile surgical marker, an appropriate rhombic flap was drawn incorporating the defect. The area thus outlined was incised deep to adipose tissue with a #15 scalpel blade.  The skin margins were undermined to an appropriate distance in all directions utilizing iris scissors.
Intermediate Repair Preamble Text (Leave Blank If You Do Not Want): Undermining was performed with blunt dissection.
Mucosal Advancement Flap Text: Given the location of the defect, shape of the defect and the proximity to free margins a mucosal advancement flap was deemed most appropriate. Incisions were made with a 15 blade scalpel in the appropriate fashion along the cutaneous vermilion border and the mucosal lip. The remaining actinically damaged mucosal tissue was excised.  The mucosal advancement flap was then elevated to the gingival sulcus with care taken to preserve the neurovascular structures and advanced into the primary defect. Care was taken to ensure that precise realignment of the vermilion border was achieved.
Paramedian Forehead Flap Text: A decision was made to reconstruct the defect utilizing an interpolation axial flap and a staged reconstruction.  A telfa template was made of the defect.  This telfa template was then used to outline the paramedian forehead pedicle flap.  The donor area for the pedicle flap was then injected with anesthesia.  The flap was excised through the skin and subcutaneous tissue down to the layer of the underlying musculature.  The pedicle flap was carefully excised within this deep plane to maintain its blood supply.  The edges of the donor site were undermined.   The donor site was closed in a primary fashion.  The pedicle was then rotated into position and sutured.  Once the tube was sutured into place, adequate blood supply was confirmed with blanching and refill.  The pedicle was then wrapped with xeroform gauze and dressed appropriately with a telfa and gauze bandage to ensure continued blood supply and protect the attached pedicle.
Additional Anesthesia Volume In Cc: 6
Complex Repair And W Plasty Text: The defect edges were debeveled with a #15 scalpel blade.  The primary defect was closed partially with a complex linear closure.  Given the location of the remaining defect, shape of the defect and the proximity to free margins a W plasty was deemed most appropriate for complete closure of the defect.  Using a sterile surgical marker, an appropriate advancement flap was drawn incorporating the defect and placing the expected incisions within the relaxed skin tension lines where possible.    The area thus outlined was incised deep to adipose tissue with a #15 scalpel blade.  The skin margins were undermined to an appropriate distance in all directions utilizing iris scissors.
Muscle Hinge Flap Text: The defect edges were debeveled with a #15 scalpel blade.  Given the size, depth and location of the defect and the proximity to free margins a muscle hinge flap was deemed most appropriate.  Using a sterile surgical marker, an appropriate hinge flap was drawn incorporating the defect. The area thus outlined was incised with a #15 scalpel blade.  The skin margins were undermined to an appropriate distance in all directions utilizing iris scissors.
Rhombic Flap Text: The defect edges were debeveled with a #15 scalpel blade.  Given the location of the defect and the proximity to free margins a rhombic flap was deemed most appropriate.  Using a sterile surgical marker, an appropriate rhombic flap was drawn incorporating the defect.    The area thus outlined was incised deep to adipose tissue with a #15 scalpel blade.  The skin margins were undermined to an appropriate distance in all directions utilizing iris scissors.
Saucerization Excision Additional Text (Leave Blank If You Do Not Want): The margin was drawn around the clinically apparent lesion.  Incisions were then made along these lines, in a tangential fashion, to the appropriate tissue plane and the lesion was extirpated.
Mastoid Interpolation Flap Text: A decision was made to reconstruct the defect utilizing an interpolation axial flap and a staged reconstruction.  A telfa template was made of the defect.  This telfa template was then used to outline the mastoid interpolation flap.  The donor area for the pedicle flap was then injected with anesthesia.  The flap was excised through the skin and subcutaneous tissue down to the layer of the underlying musculature.  The pedicle flap was carefully excised within this deep plane to maintain its blood supply.  The edges of the donor site were undermined.   The donor site was closed in a primary fashion.  The pedicle was then rotated into position and sutured.  Once the tube was sutured into place, adequate blood supply was confirmed with blanching and refill.  The pedicle was then wrapped with xeroform gauze and dressed appropriately with a telfa and gauze bandage to ensure continued blood supply and protect the attached pedicle.
Wound Care: Bacitracin
Complex Repair And Double Advancement Flap Text: The defect edges were debeveled with a #15 scalpel blade.  The primary defect was closed partially with a complex linear closure.  Given the location of the remaining defect, shape of the defect and the proximity to free margins a double advancement flap was deemed most appropriate for complete closure of the defect.  Using a sterile surgical marker, an appropriate advancement flap was drawn incorporating the defect and placing the expected incisions within the relaxed skin tension lines where possible.    The area thus outlined was incised deep to adipose tissue with a #15 scalpel blade.  The skin margins were undermined to an appropriate distance in all directions utilizing iris scissors.
Path Notes (To The Dermatopathologist): Please check margins.
Complex Repair And Dermal Autograft Text: The defect edges were debeveled with a #15 scalpel blade.  The primary defect was closed partially with a complex linear closure.  Given the location of the defect, shape of the defect and the proximity to free margins an dermal autograft was deemed most appropriate to repair the remaining defect.  The graft was trimmed to fit the size of the remaining defect.  The graft was then placed in the primary defect, oriented appropriately, and sutured into place.
No Repair - Repaired With Adjacent Surgical Defect Text (Leave Blank If You Do Not Want): After the excision the defect was repaired concurrently with another surgical defect which was in close approximation.
Advancement Flap (Single) Text: The defect edges were debeveled with a #15 scalpel blade.  Given the location of the defect and the proximity to free margins a single advancement flap was deemed most appropriate.  Using a sterile surgical marker, an appropriate advancement flap was drawn incorporating the defect and placing the expected incisions within the relaxed skin tension lines where possible.    The area thus outlined was incised deep to adipose tissue with a #15 scalpel blade.  The skin margins were undermined to an appropriate distance in all directions utilizing iris scissors.
O-Z Plasty Text: The defect edges were debeveled with a #15 scalpel blade.  Given the location of the defect, shape of the defect and the proximity to free margins an O-Z plasty (double transposition flap) was deemed most appropriate.  Using a sterile surgical marker, the appropriate transposition flaps were drawn incorporating the defect and placing the expected incisions within the relaxed skin tension lines where possible.    The area thus outlined was incised deep to adipose tissue with a #15 scalpel blade.  The skin margins were undermined to an appropriate distance in all directions utilizing iris scissors.  Hemostasis was achieved with electrocautery.  The flaps were then transposed into place, one clockwise and the other counterclockwise, and anchored with interrupted buried subcutaneous sutures.
Anesthesia Type: 1% lidocaine with epinephrine
Double Island Pedicle Flap Text: The defect edges were debeveled with a #15 scalpel blade.  Given the location of the defect, shape of the defect and the proximity to free margins a double island pedicle advancement flap was deemed most appropriate.  Using a sterile surgical marker, an appropriate advancement flap was drawn incorporating the defect, outlining the appropriate donor tissue and placing the expected incisions within the relaxed skin tension lines where possible.    The area thus outlined was incised deep to adipose tissue with a #15 scalpel blade.  The skin margins were undermined to an appropriate distance in all directions around the primary defect and laterally outward around the island pedicle utilizing iris scissors.  There was minimal undermining beneath the pedicle flap.
Size Of Lesion In Cm: 2.1
Scalpel Size: 15 blade
Cartilage Graft Text: The defect edges were debeveled with a #15 scalpel blade.  Given the location of the defect, shape of the defect, the fact the defect involved a full thickness cartilage defect a cartilage graft was deemed most appropriate.  An appropriate donor site was identified, cleansed, and anesthetized. The cartilage graft was then harvested and transferred to the recipient site, oriented appropriately and then sutured into place.  The secondary defect was then repaired using a primary closure.
Hatchet Flap Text: The defect edges were debeveled with a #15 scalpel blade.  Given the location of the defect, shape of the defect and the proximity to free margins a hatchet flap was deemed most appropriate.  Using a sterile surgical marker, an appropriate hatchet flap was drawn incorporating the defect and placing the expected incisions within the relaxed skin tension lines where possible.    The area thus outlined was incised deep to adipose tissue with a #15 scalpel blade.  The skin margins were undermined to an appropriate distance in all directions utilizing iris scissors.
Composite Graft Text: The defect edges were debeveled with a #15 scalpel blade.  Given the location of the defect, shape of the defect, the proximity to free margins and the fact the defect was full thickness a composite graft was deemed most appropriate.  The defect was outline and then transferred to the donor site.  A full thickness graft was then excised from the donor site. The graft was then placed in the primary defect, oriented appropriately and then sutured into place.  The secondary defect was then repaired using a primary closure.
Complex Repair And Z Plasty Text: The defect edges were debeveled with a #15 scalpel blade.  The primary defect was closed partially with a complex linear closure.  Given the location of the remaining defect, shape of the defect and the proximity to free margins a Z plasty was deemed most appropriate for complete closure of the defect.  Using a sterile surgical marker, an appropriate advancement flap was drawn incorporating the defect and placing the expected incisions within the relaxed skin tension lines where possible.    The area thus outlined was incised deep to adipose tissue with a #15 scalpel blade.  The skin margins were undermined to an appropriate distance in all directions utilizing iris scissors.
Billing Type: Third-Party Bill
Epidermal Sutures: 5-0 Vicryl Rapide
Repair Type: Complex
Complex Repair And Rotation Flap Text: The defect edges were debeveled with a #15 scalpel blade.  The primary defect was closed partially with a complex linear closure.  Given the location of the remaining defect, shape of the defect and the proximity to free margins a rotation flap was deemed most appropriate for complete closure of the defect.  Using a sterile surgical marker, an appropriate advancement flap was drawn incorporating the defect and placing the expected incisions within the relaxed skin tension lines where possible.    The area thus outlined was incised deep to adipose tissue with a #15 scalpel blade.  The skin margins were undermined to an appropriate distance in all directions utilizing iris scissors.
O-T Plasty Text: The defect edges were debeveled with a #15 scalpel blade.  Given the location of the defect, shape of the defect and the proximity to free margins an O-T plasty was deemed most appropriate.  Using a sterile surgical marker, an appropriate O-T plasty was drawn incorporating the defect and placing the expected incisions within the relaxed skin tension lines where possible.    The area thus outlined was incised deep to adipose tissue with a #15 scalpel blade.  The skin margins were undermined to an appropriate distance in all directions utilizing iris scissors.
Purse String (Simple) Text: Given the location of the defect and the characteristics of the surrounding skin a purse string simple closure was deemed most appropriate.  Undermining was performed circumferentially around the surgical defect.  A purse string suture was then placed and tightened.
V-Y Plasty Text: The defect edges were debeveled with a #15 scalpel blade.  Given the location of the defect, shape of the defect and the proximity to free margins an V-Y advancement flap was deemed most appropriate.  Using a sterile surgical marker, an appropriate advancement flap was drawn incorporating the defect and placing the expected incisions within the relaxed skin tension lines where possible.    The area thus outlined was incised deep to adipose tissue with a #15 scalpel blade.  The skin margins were undermined to an appropriate distance in all directions utilizing iris scissors.
Melolabial Transposition Flap Text: The defect edges were debeveled with a #15 scalpel blade.  Given the location of the defect and the proximity to free margins a melolabial flap was deemed most appropriate.  Using a sterile surgical marker, an appropriate melolabial transposition flap was drawn incorporating the defect.    The area thus outlined was incised deep to adipose tissue with a #15 scalpel blade.  The skin margins were undermined to an appropriate distance in all directions utilizing iris scissors.
Complex Repair And Modified Advancement Flap Text: The defect edges were debeveled with a #15 scalpel blade.  The primary defect was closed partially with a complex linear closure.  Given the location of the remaining defect, shape of the defect and the proximity to free margins a modified advancement flap was deemed most appropriate for complete closure of the defect.  Using a sterile surgical marker, an appropriate advancement flap was drawn incorporating the defect and placing the expected incisions within the relaxed skin tension lines where possible.    The area thus outlined was incised deep to adipose tissue with a #15 scalpel blade.  The skin margins were undermined to an appropriate distance in all directions utilizing iris scissors.
Advancement-Rotation Flap Text: The defect edges were debeveled with a #15 scalpel blade.  Given the location of the defect, shape of the defect and the proximity to free margins an advancement-rotation flap was deemed most appropriate.  Using a sterile surgical marker, an appropriate flap was drawn incorporating the defect and placing the expected incisions within the relaxed skin tension lines where possible. The area thus outlined was incised deep to adipose tissue with a #15 scalpel blade.  The skin margins were undermined to an appropriate distance in all directions utilizing iris scissors.
Bilobed Transposition Flap Text: The defect edges were debeveled with a #15 scalpel blade.  Given the location of the defect and the proximity to free margins a bilobed transposition flap was deemed most appropriate.  Using a sterile surgical marker, an appropriate bilobe flap drawn around the defect.    The area thus outlined was incised deep to adipose tissue with a #15 scalpel blade.  The skin margins were undermined to an appropriate distance in all directions utilizing iris scissors.
Complex Repair And Single Advancement Flap Text: The defect edges were debeveled with a #15 scalpel blade.  The primary defect was closed partially with a complex linear closure.  Given the location of the remaining defect, shape of the defect and the proximity to free margins a single advancement flap was deemed most appropriate for complete closure of the defect.  Using a sterile surgical marker, an appropriate advancement flap was drawn incorporating the defect and placing the expected incisions within the relaxed skin tension lines where possible.    The area thus outlined was incised deep to adipose tissue with a #15 scalpel blade.  The skin margins were undermined to an appropriate distance in all directions utilizing iris scissors.
S Plasty Text: Given the location and shape of the defect, and the orientation of relaxed skin tension lines, an S-plasty was deemed most appropriate for repair.  Using a sterile surgical marker, the appropriate outline of the S-plasty was drawn, incorporating the defect and placing the expected incisions within the relaxed skin tension lines where possible.  The area thus outlined was incised deep to adipose tissue with a #15 scalpel blade.  The skin margins were undermined to an appropriate distance in all directions utilizing iris scissors. The skin flaps were advanced over the defect.  The opposing margins were then approximated with interrupted buried subcutaneous sutures.
Dorsal Nasal Flap Text: The defect edges were debeveled with a #15 scalpel blade.  Given the location of the defect and the proximity to free margins a dorsal nasal flap was deemed most appropriate.  Using a sterile surgical marker, an appropriate dorsal nasal flap was drawn around the defect.    The area thus outlined was incised deep to adipose tissue with a #15 scalpel blade.  The skin margins were undermined to an appropriate distance in all directions utilizing iris scissors.
Consent was obtained from the patient. The risks and benefits to therapy were discussed in detail. Specifically, the risks of infection, scarring, bleeding, prolonged wound healing, incomplete removal, allergy to anesthesia, nerve injury and recurrence were addressed. Prior to the procedure, the treatment site was clearly identified and confirmed by the patient. All components of Universal Protocol/PAUSE Rule completed.
Deep Sutures: 2-0 Vicryl
Complex Repair And Bilobe Flap Text: The defect edges were debeveled with a #15 scalpel blade.  The primary defect was closed partially with a complex linear closure.  Given the location of the remaining defect, shape of the defect and the proximity to free margins a bilobe flap was deemed most appropriate for complete closure of the defect.  Using a sterile surgical marker, an appropriate advancement flap was drawn incorporating the defect and placing the expected incisions within the relaxed skin tension lines where possible.    The area thus outlined was incised deep to adipose tissue with a #15 scalpel blade.  The skin margins were undermined to an appropriate distance in all directions utilizing iris scissors.
Size Of Margin In Cm: 0.2
O-L Flap Text: The defect edges were debeveled with a #15 scalpel blade.  Given the location of the defect, shape of the defect and the proximity to free margins an O-L flap was deemed most appropriate.  Using a sterile surgical marker, an appropriate advancement flap was drawn incorporating the defect and placing the expected incisions within the relaxed skin tension lines where possible.    The area thus outlined was incised deep to adipose tissue with a #15 scalpel blade.  The skin margins were undermined to an appropriate distance in all directions utilizing iris scissors.
Complex Repair And Epidermal Autograft Text: The defect edges were debeveled with a #15 scalpel blade.  The primary defect was closed partially with a complex linear closure.  Given the location of the defect, shape of the defect and the proximity to free margins an epidermal autograft was deemed most appropriate to repair the remaining defect.  The graft was trimmed to fit the size of the remaining defect.  The graft was then placed in the primary defect, oriented appropriately, and sutured into place.
Repair Performed By Another Provider Text (Leave Blank If You Do Not Want): After the tissue was excised the defect was repaired by another provider.
Keystone Flap Text: The defect edges were debeveled with a #15 scalpel blade.  Given the location of the defect, shape of the defect a keystone flap was deemed most appropriate.  Using a sterile surgical marker, an appropriate keystone flap was drawn incorporating the defect, outlining the appropriate donor tissue and placing the expected incisions within the relaxed skin tension lines where possible. The area thus outlined was incised deep to adipose tissue with a #15 scalpel blade.  The skin margins were undermined to an appropriate distance in all directions around the primary defect and laterally outward around the flap utilizing iris scissors.
Complex Repair And Split-Thickness Skin Graft Text: The defect edges were debeveled with a #15 scalpel blade.  The primary defect was closed partially with a complex linear closure.  Given the location of the defect, shape of the defect and the proximity to free margins a split thickness skin graft was deemed most appropriate to repair the remaining defect.  The graft was trimmed to fit the size of the remaining defect.  The graft was then placed in the primary defect, oriented appropriately, and sutured into place.
Trilobed Flap Text: The defect edges were debeveled with a #15 scalpel blade.  Given the location of the defect and the proximity to free margins a trilobed flap was deemed most appropriate.  Using a sterile surgical marker, an appropriate trilobed flap drawn around the defect.    The area thus outlined was incised deep to adipose tissue with a #15 scalpel blade.  The skin margins were undermined to an appropriate distance in all directions utilizing iris scissors.
Melolabial Interpolation Flap Text: A decision was made to reconstruct the defect utilizing an interpolation axial flap and a staged reconstruction.  A telfa template was made of the defect.  This telfa template was then used to outline the melolabial interpolation flap.  The donor area for the pedicle flap was then injected with anesthesia.  The flap was excised through the skin and subcutaneous tissue down to the layer of the underlying musculature.  The pedicle flap was carefully excised within this deep plane to maintain its blood supply.  The edges of the donor site were undermined.   The donor site was closed in a primary fashion.  The pedicle was then rotated into position and sutured.  Once the tube was sutured into place, adequate blood supply was confirmed with blanching and refill.  The pedicle was then wrapped with xeroform gauze and dressed appropriately with a telfa and gauze bandage to ensure continued blood supply and protect the attached pedicle.
Epidermal Closure: running subcuticular
Dermal Closure: buried vertical mattress
Excision Depth: adipose tissue
Ftsg Text: The defect edges were debeveled with a #15 scalpel blade.  Given the location of the defect, shape of the defect and the proximity to free margins a full thickness skin graft was deemed most appropriate.  Using a sterile surgical marker, the primary defect shape was transferred to the donor site. The area thus outlined was incised deep to adipose tissue with a #15 scalpel blade.  The harvested graft was then trimmed of adipose tissue until only dermis and epidermis was left.  The skin margins of the secondary defect were undermined to an appropriate distance in all directions utilizing iris scissors.  The secondary defect was closed with interrupted buried subcutaneous sutures.  The skin edges were then re-apposed with running  sutures.  The skin graft was then placed in the primary defect and oriented appropriately.
Hemostasis: Electrocautery
Repair Anesthesia Method: local infiltration
Complex Repair And Double M Plasty Text: The defect edges were debeveled with a #15 scalpel blade.  The primary defect was closed partially with a complex linear closure.  Given the location of the remaining defect, shape of the defect and the proximity to free margins a double M plasty was deemed most appropriate for complete closure of the defect.  Using a sterile surgical marker, an appropriate advancement flap was drawn incorporating the defect and placing the expected incisions within the relaxed skin tension lines where possible.    The area thus outlined was incised deep to adipose tissue with a #15 scalpel blade.  The skin margins were undermined to an appropriate distance in all directions utilizing iris scissors.
Epidermal Autograft Text: The defect edges were debeveled with a #15 scalpel blade.  Given the location of the defect, shape of the defect and the proximity to free margins an epidermal autograft was deemed most appropriate.  Using a sterile surgical marker, the primary defect shape was transferred to the donor site. The epidermal graft was then harvested.  The skin graft was then placed in the primary defect and oriented appropriately.
Complex Repair And O-L Flap Text: The defect edges were debeveled with a #15 scalpel blade.  The primary defect was closed partially with a complex linear closure.  Given the location of the remaining defect, shape of the defect and the proximity to free margins an O-L flap was deemed most appropriate for complete closure of the defect.  Using a sterile surgical marker, an appropriate flap was drawn incorporating the defect and placing the expected incisions within the relaxed skin tension lines where possible.    The area thus outlined was incised deep to adipose tissue with a #15 scalpel blade.  The skin margins were undermined to an appropriate distance in all directions utilizing iris scissors.
Home Suture Removal Text: Patient was provided a home suture removal kit and will remove their sutures at home.  If they have any questions or difficulties they will call the office.
Slit Excision Additional Text (Leave Blank If You Do Not Want): A linear line was drawn on the skin overlying the lesion. An incision was made slowly until the lesion was visualized.  Once visualized, the lesion was removed with blunt dissection.
V-Y Flap Text: The defect edges were debeveled with a #15 scalpel blade.  Given the location of the defect, shape of the defect and the proximity to free margins a V-Y flap was deemed most appropriate.  Using a sterile surgical marker, an appropriate advancement flap was drawn incorporating the defect and placing the expected incisions within the relaxed skin tension lines where possible.    The area thus outlined was incised deep to adipose tissue with a #15 scalpel blade.  The skin margins were undermined to an appropriate distance in all directions utilizing iris scissors.
Post-Care Instructions: I reviewed with the patient in detail post-care instructions:\\n1. Apply bacitracin over the steri-strips.  \\n2. Cut non-stick pad (Telfa) to cover the steri-strips\\n3. Apply tape (hypafix) over the non-stick pad\\n4. Change once per day for 5 days\\n5. Shower with bandage on, change bandage after shower\\n\\nPatient is not to engage in any heavy lifting, exercise, hot tub, or swimming for the next 14 days. Should the patient develop any fevers, chills, bleeding, severe pain patient will contact the office immediately.
Complex Repair And Rhombic Flap Text: The defect edges were debeveled with a #15 scalpel blade.  The primary defect was closed partially with a complex linear closure.  Given the location of the remaining defect, shape of the defect and the proximity to free margins a rhombic flap was deemed most appropriate for complete closure of the defect.  Using a sterile surgical marker, an appropriate advancement flap was drawn incorporating the defect and placing the expected incisions within the relaxed skin tension lines where possible.    The area thus outlined was incised deep to adipose tissue with a #15 scalpel blade.  The skin margins were undermined to an appropriate distance in all directions utilizing iris scissors.

## 2018-08-21 ENCOUNTER — OFFICE VISIT (OUTPATIENT)
Dept: MEDICAL GROUP | Facility: MEDICAL CENTER | Age: 78
End: 2018-08-21
Payer: MEDICARE

## 2018-08-21 VITALS
HEART RATE: 80 BPM | DIASTOLIC BLOOD PRESSURE: 76 MMHG | TEMPERATURE: 98.2 F | BODY MASS INDEX: 35.09 KG/M2 | RESPIRATION RATE: 20 BRPM | HEIGHT: 67 IN | WEIGHT: 223.55 LBS | OXYGEN SATURATION: 94 % | SYSTOLIC BLOOD PRESSURE: 118 MMHG

## 2018-08-21 DIAGNOSIS — Z15.89: ICD-10-CM

## 2018-08-21 DIAGNOSIS — I25.83 CORONARY ARTERY DISEASE DUE TO LIPID RICH PLAQUE: ICD-10-CM

## 2018-08-21 DIAGNOSIS — E88.89: ICD-10-CM

## 2018-08-21 DIAGNOSIS — R53.83 OTHER FATIGUE: ICD-10-CM

## 2018-08-21 DIAGNOSIS — G89.29 OTHER CHRONIC PAIN: ICD-10-CM

## 2018-08-21 DIAGNOSIS — I25.10 CORONARY ARTERY DISEASE DUE TO LIPID RICH PLAQUE: ICD-10-CM

## 2018-08-21 DIAGNOSIS — G47.33 OBSTRUCTIVE SLEEP APNEA SYNDROME: ICD-10-CM

## 2018-08-21 DIAGNOSIS — I10 ESSENTIAL HYPERTENSION: ICD-10-CM

## 2018-08-21 DIAGNOSIS — E78.5 DYSLIPIDEMIA: ICD-10-CM

## 2018-08-21 PROBLEM — E88.09 CYP3A4 DEFICIENCY: Status: ACTIVE | Noted: 2018-08-21

## 2018-08-21 PROCEDURE — 99214 OFFICE O/P EST MOD 30 MIN: CPT | Performed by: FAMILY MEDICINE

## 2018-08-21 RX ORDER — GABAPENTIN 100 MG/1
CAPSULE ORAL
Refills: 11 | COMMUNITY
Start: 2018-08-08 | End: 2018-09-19

## 2018-08-21 RX ORDER — IRBESARTAN 150 MG/1
TABLET ORAL
COMMUNITY
Start: 2004-01-07 | End: 2018-09-19

## 2018-08-21 RX ORDER — ASPIRIN 81 MG/1
81 TABLET ORAL DAILY
Qty: 100 TAB | Refills: 0 | COMMUNITY
Start: 2018-08-21

## 2018-08-21 RX ORDER — DULOXETIN HYDROCHLORIDE 30 MG/1
CAPSULE, DELAYED RELEASE ORAL
Refills: 11 | COMMUNITY
Start: 2018-07-16 | End: 2018-08-21

## 2018-08-21 RX ORDER — HYDROMORPHONE HYDROCHLORIDE 2 MG/1
1 TABLET ORAL 2 TIMES DAILY PRN
Status: ON HOLD | COMMUNITY
End: 2021-11-02

## 2018-08-21 RX ORDER — TRAMADOL HYDROCHLORIDE 50 MG/1
50 TABLET ORAL EVERY 4 HOURS PRN
Status: ON HOLD | COMMUNITY
End: 2021-11-02

## 2018-08-21 RX ORDER — DULOXETIN HYDROCHLORIDE 30 MG/1
CAPSULE, DELAYED RELEASE ORAL
Qty: 30 CAP | Refills: 0
Start: 2018-08-21 | End: 2018-09-19 | Stop reason: SDUPTHER

## 2018-08-21 RX ORDER — ATORVASTATIN CALCIUM 80 MG/1
40 TABLET, FILM COATED ORAL DAILY
COMMUNITY
Start: 2018-05-31 | End: 2021-07-09 | Stop reason: SDUPTHER

## 2018-08-21 NOTE — ASSESSMENT & PLAN NOTE
Patient describes some significant fatigue. His cardiologist is already evaluating him for this. He states that he had recent labs. He denies chest pain or shortness of breath.

## 2018-08-21 NOTE — ASSESSMENT & PLAN NOTE
The patient has coronary artery disease status post stent placement. He follows with cardiology. He is maintained on Toprol-XL, baby aspirin and a cholesterol medicine. He states that he is taking both atorvastatin and Vytorin. He denies chest pain or shortness of breath.

## 2018-08-21 NOTE — ASSESSMENT & PLAN NOTE
The patient has chronic pain and follows with pain management. He is treated with as needed tramadol and Dilaudid. He also has a pain pump. 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 He is also maintained on gabapentin.

## 2018-08-21 NOTE — ASSESSMENT & PLAN NOTE
Patient brings a sheet stating that he has mutation in this gene. For this reason, he is a poor drug metabolizer. Many of these drugs were added to his allergy list.

## 2018-08-21 NOTE — PROGRESS NOTES
[facility]  Progress Note  Established Patient    Subjective:   Amarjit Khan is a 78 y.o. male here today with a chief complaint of CAD. The patient is accompanied by his wife.     CAD (coronary artery disease)  The patient has coronary artery disease status post stent placement. He follows with cardiology. He is maintained on Toprol-XL, baby aspirin and a cholesterol medicine. He states that he is taking both atorvastatin and Vytorin. He denies chest pain or shortness of breath.    Chronic pain  The patient has chronic pain and follows with pain management. He is treated with as needed tramadol and Dilaudid. He also has a pain pump. 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 He is also maintained on gabapentin.    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 For this reason, he is a poor drug metabolizer. Many of these drugs were added to his allergy list.    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 For this reason, he is a poor drug metabolizer. Many of these drugs were added to his allergy list.    Dyslipidemia  The patient has dyslipidemia and states that he is on both Lipitor and Vytorin. He follows with cardiology.    Fatigue  Patient describes some significant fatigue. His cardiologist is already evaluating him for this. He states that he had recent labs. He denies chest pain or shortness of breath.    HTN (hypertension)  Patient has hypertension. He states he is taking Toprol-XL, lisinopril and irbesartan.    Obstructive sleep apnea syndrome  The patient has a CPAP for his obstructive sleep apnea.      Current Outpatient Prescriptions on File Prior to Visit   Medication Sig Dispense Refill   • ezetimibe-simvastatin (VYTORIN) 10-80 MG per tablet Take 1 Tab by mouth every evening. Takes 1/2 tablet every  evening     • meloxicam (MOBIC) 7.5 MG TABS Take 15 mg by mouth every day.     • lisinopril (PRINIVIL) 20 MG TABS Take 20 mg by mouth every morning.     • metoprolol SR (TOPROL XL) 25 MG TB24 Take 1 Tab by mouth every day. (Patient taking differently: Take 12.5 mg by mouth 2 Times a Day.) 30 Tab 0   • Cholecalciferol (VITAMIN D) 2000 UNIT CAPS Take 2,000 Units by mouth every day.       No current facility-administered medications on file prior to visit.        Past Medical History:   Diagnosis Date   • Acute MI ([facility]) 1997    cardiologist, Dr. Hernández   • Allergy    • Arthritis    • Cancer ([facility])     Skin   • High cholesterol    • Hyperlipidemia    • Hypertension    • Muscle disorder    • Personal history of venous thrombosis and embolism 2004    left arm   • Rectal abscess    • Rheumatic fever as child   • Sleep apnea     CPAP   • Stroke ([facility])    • Unspecified cataract     surgical correction bilateral       Allergies: Butrans [buprenorphine]; Codeine; Darvocet [propoxyphene n-apap]; Fentanyl; Nucynta [tapentadol]; Percocet [oxycodone-acetaminophen]; Ambien [zolpidem]; Biaxin [clarithromycin]; Buspar [buspirone]; Celexa; Citalopram; Clindamycin; Clonazepam; Demerol; Diltiazem; Doxepin; Effexor [venlafaxine]; Erythromycin; Haldol [haloperidol]; Hydrocodone; Lexapro; Lunesta; Lyrica; Morphine sulfate [bupropion]; Remeron [mirtazapine]; Serzone [nefazodone]; Tape; Valium; Xanax [alprazolam]; and Zyprexa    Surgical History:  has a past surgical history that includes anal fistulotomy (8/27/08); pr percut implnt neuroelect,epidural (7/15/2015); pr percut implnt neuroelect,epidural (7/15/2015); cataract extraction with iol (Bilateral, 2010); stent placement (2005,2010); knee arthrotomy (Right, 1990); knee arthrotomy (Right, 1992); knee arthroscopy (Left, 1995); nerve ulnar transfer (Right, 2004); elbow arthrotomy (Left, 1998); shoulder arthrotomy (Right, 2006); lumbar laminectomy diskectomy (1988); hip arthroplasty total  "(Left, 2015); spinal cord stimulator (2015); and eye surgery.    Family History: family history includes Diabetes in his mother; Heart Disease in his mother; Hyperlipidemia in his mother.    Social History:  reports that he has never smoked. He has never used smokeless tobacco. He reports that he drinks alcohol. He reports that he does not use drugs.    ROS: no CP or SOB.        Objective:     Vitals:    18 1531   BP: 118/76   Pulse: 80   Resp: 20   Temp: 36.8 °C (98.2 °F)   SpO2: 94%   Weight: 101.4 kg (223 lb 8.7 oz)   Height: 1.702 m (5' 7\")       Physical Exam:  General: alert in no apparent distress.   Cardio: regular rate and rhythm, no murmurs, rubs or gallops.   Resp: CTAB no w/r/r.         Assessment and Plan:     1. Coronary artery disease due to lipid rich plaque  - f/u cardiology, records requested.   - continue bASA.   - will review records to see if he's on both Vytorin and Lipitor, which would be very unusual.     2. Dyslipidemia  - f/u cardiology, records requested.   - will review records to see if he's on both Vytorin and Lipitor, which would be very unusual.     3. Essential hypertension  - will review records to see if he's on both irbesartan and lisinopril, which would be very unusual.    4. Other chronic pain  - f/u pain mgmt.     5. Obstructive sleep apnea syndrome  - continue CPAP.     6. Other fatigue  - records requested, may need to add labs.     7. 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     8. CY gene mutation  - review any drug before starting.         Followup: Return in about 4 weeks (around 2018), or if symptoms worsen or fail to improve.         "

## 2018-08-21 NOTE — LETTER
[facility]  Perry Canchola M.D.  [address]  [facility] [address]  Fax: [phone]   Authorization for Release/Disclosure of   Protected Health Information   Name: AMARJIT MOREIRA : 1940 SSN: [id number]   Address: [address] [place] [address] Phone:    [phone] (home)    I authorize the entity listed below to release/disclose the PHI below to:   [facility]/Perry Canchola M.D. and Perry Canchola M.D.   Provider or Entity Name:     Address   City, State, Zip   Phone:      Fax:     Reason for request: continuity of care   Information to be released:    [  ] LAST COLONOSCOPY,  including any PATH REPORT and follow-up  [  ] LAST FIT/COLOGUARD RESULT [  ] LAST DEXA  [  ] LAST MAMMOGRAM  [  ] LAST PAP  [  ] LAST LABS [  ] RETINA EXAM REPORT  [  ] IMMUNIZATION RECORDS  [  ] Release all info      [  ] Check here and initial the line next to each item to release ALL health information INCLUDING  _____ Care and treatment for drug and / or alcohol abuse  _____ HIV testing, infection status, or AIDS  _____ Genetic Testing    DATES OF SERVICE OR TIME PERIOD TO BE DISCLOSED: _____________  I understand and acknowledge that:  * This Authorization may be revoked at any time by you in writing, except if your health information has already been used or disclosed.  * Your health information that will be used or disclosed as a result of you signing this authorization could be re-disclosed by the recipient. If this occurs, your re-disclosed health information may no longer be protected by State or Federal laws.  * You may refuse to sign this Authorization. Your refusal will not affect your ability to obtain treatment.  * This Authorization becomes effective upon signing and will  on (date) __________.      If no date is indicated, this Authorization will  one (1) year from the signature date.    Name: Amarjit Moreira    Signature:   Date:     2018       PLEASE FAX  REQUESTED RECORDS BACK TO: [phone]

## 2018-09-19 ENCOUNTER — OFFICE VISIT (OUTPATIENT)
Dept: MEDICAL GROUP | Facility: MEDICAL CENTER | Age: 78
End: 2018-09-19
Payer: MEDICARE

## 2018-09-19 VITALS
RESPIRATION RATE: 16 BRPM | DIASTOLIC BLOOD PRESSURE: 68 MMHG | HEIGHT: 67 IN | TEMPERATURE: 97.4 F | SYSTOLIC BLOOD PRESSURE: 112 MMHG | HEART RATE: 86 BPM | OXYGEN SATURATION: 95 %

## 2018-09-19 DIAGNOSIS — R53.83 OTHER FATIGUE: ICD-10-CM

## 2018-09-19 DIAGNOSIS — I10 ESSENTIAL HYPERTENSION: ICD-10-CM

## 2018-09-19 DIAGNOSIS — I25.83 CORONARY ARTERY DISEASE DUE TO LIPID RICH PLAQUE: ICD-10-CM

## 2018-09-19 DIAGNOSIS — E78.5 DYSLIPIDEMIA: ICD-10-CM

## 2018-09-19 DIAGNOSIS — I25.10 CORONARY ARTERY DISEASE DUE TO LIPID RICH PLAQUE: ICD-10-CM

## 2018-09-19 DIAGNOSIS — G89.29 OTHER CHRONIC PAIN: ICD-10-CM

## 2018-09-19 PROCEDURE — 99214 OFFICE O/P EST MOD 30 MIN: CPT | Performed by: FAMILY MEDICINE

## 2018-09-19 RX ORDER — DULOXETIN HYDROCHLORIDE 30 MG/1
CAPSULE, DELAYED RELEASE ORAL
Qty: 108 CAP | Refills: 4 | Status: SHIPPED | OUTPATIENT
Start: 2018-09-19 | End: 2019-05-07

## 2018-09-19 RX ORDER — MELOXICAM 15 MG/1
15 TABLET ORAL DAILY
Qty: 30 TAB | Refills: 0
Start: 2018-09-19 | End: 2019-05-07 | Stop reason: SDUPTHER

## 2018-09-19 RX ORDER — MODAFINIL 200 MG/1
200 TABLET ORAL DAILY
COMMUNITY
End: 2019-05-07

## 2018-09-19 RX ORDER — METOPROLOL SUCCINATE 25 MG/1
12.5 TABLET, EXTENDED RELEASE ORAL 2 TIMES DAILY
Qty: 180 TAB | Refills: 0
Start: 2018-09-19 | End: 2021-07-09 | Stop reason: SDUPTHER

## 2018-09-19 RX ORDER — LISINOPRIL AND HYDROCHLOROTHIAZIDE 20; 12.5 MG/1; MG/1
1 TABLET ORAL DAILY
Qty: 30 TAB | Refills: 0
Start: 2018-09-19 | End: 2021-07-09 | Stop reason: SDUPTHER

## 2018-09-19 RX ORDER — GABAPENTIN 100 MG/1
100 CAPSULE ORAL
Qty: 90 CAP | Refills: 0
Start: 2018-09-19 | End: 2019-05-07

## 2018-09-19 NOTE — ASSESSMENT & PLAN NOTE
The patient follows pain management for his chronic pain issues. He is actively working on reducing his opiate dosing. He is also maintained on gabapentin 100 mg at night and Cymbalta.

## 2018-09-19 NOTE — ASSESSMENT & PLAN NOTE
The patient has coronary artery disease. He follows with cardiology. He is currently maintained on baby aspirin and atorvastatin 80 mg daily. He denies chest pain and shortness of breath.

## 2018-09-19 NOTE — ASSESSMENT & PLAN NOTE
Patient describes some significant fatigue. He will be having a new sleep study in the near future. He is also maintained on modafinil by his sleep doctor, which he doesn't think is helping.

## 2018-09-19 NOTE — PROGRESS NOTES
[facility]  Progress Note  Established Patient    Subjective:   Amarjit Khan is a 78 y.o. male here today with a chief complaint of fatigue. The patient is alone.     CAD (coronary artery disease)  The patient has coronary artery disease. He follows with cardiology. He is currently maintained on baby aspirin and atorvastatin 80 mg daily. He denies chest pain and shortness of breath.    Chronic pain  The patient follows pain management for his chronic pain issues. He is actively working on reducing his opiate dosing. He is also maintained on gabapentin 100 mg at night and Cymbalta.    Dyslipidemia  Patient's cholesterol is controlled with atorvastatin 80 mg daily, which he tolerates well.    Fatigue  Patient describes some significant fatigue. He will be having a new sleep study in the near future. He is also maintained on modafinil by his sleep doctor, which he doesn't think is helping.    HTN (hypertension)  The patient's blood pressure is at goal on his current medication regimen.      Current Outpatient Prescriptions on File Prior to Visit   Medication Sig Dispense Refill   • atorvastatin (LIPITOR) 80 MG tablet      • tramadol (ULTRAM) 50 MG Tab Take 50 mg by mouth every four hours as needed.     • aspirin (ASA) 81 MG Chew Tab chewable tablet Take 1 Tab by mouth every day. 100 Tab 0   • Cholecalciferol (VITAMIN D) 2000 UNIT CAPS Take 2,000 Units by mouth every day.     • HYDROmorphone (DILAUDID) 2 MG Tab Take 2 mg by mouth 2 times a day as needed for Severe Pain.       No current facility-administered medications on file prior to visit.        Past Medical History:   Diagnosis Date   • Acute MI (HCC) 1997    cardiologist, Dr. Hernández   • Allergy    • Arthritis    • Cancer (HCC)     Skin   • High cholesterol    • Hyperlipidemia    • Hypertension    • Muscle disorder    • Personal history of venous thrombosis and embolism 2004    left arm   • Rectal abscess    • Rheumatic fever as child   • Sleep apnea  "    CPAP   • Stroke (HCC)    • Unspecified cataract     surgical correction bilateral       Allergies: Butrans [buprenorphine]; Codeine; Darvocet [propoxyphene n-apap]; Fentanyl; Nucynta [tapentadol]; Percocet [oxycodone-acetaminophen]; Ambien [zolpidem]; Biaxin [clarithromycin]; Buspar [buspirone]; Celexa; Citalopram; Clindamycin; Clonazepam; Demerol; Diltiazem; Doxepin; Effexor [venlafaxine]; Erythromycin; Haldol [haloperidol]; Hydrocodone; Lexapro; Lunesta; Lyrica; Morphine sulfate [bupropion]; Remeron [mirtazapine]; Serzone [nefazodone]; Tape; Valium; Xanax [alprazolam]; and Zyprexa    Surgical History:  has a past surgical history that includes anal fistulotomy (8/27/08); pr percut implnt neuroelect,epidural (7/15/2015); pr percut implnt neuroelect,epidural (7/15/2015); cataract extraction with iol (Bilateral, 2010); stent placement (2005,2010); knee arthrotomy (Right, 1990); knee arthrotomy (Right, 1992); knee arthroscopy (Left, 1995); nerve ulnar transfer (Right, 2004); elbow arthrotomy (Left, 1998); shoulder arthrotomy (Right, 2006); lumbar laminectomy diskectomy (1988); hip arthroplasty total (Left, 2/2015); spinal cord stimulator (7/29/2015); and eye surgery.    Family History: family history includes Diabetes in his mother; Heart Disease in his mother; Hyperlipidemia in his mother.    Social History:  reports that he has never smoked. He has never used smokeless tobacco. He reports that he drinks alcohol. He reports that he does not use drugs.    ROS: no CP or SOB.        Objective:     Vitals:    09/19/18 0949   BP: 112/68   BP Location: Left arm   Patient Position: Sitting   BP Cuff Size: Large adult   Pulse: 86   Resp: 16   Temp: 36.3 °C (97.4 °F)   TempSrc: Temporal   SpO2: 95%   Height: 1.702 m (5' 7\")       Physical Exam:  General: alert in no apparent distress.   Gait: normal.         Assessment and Plan:     1. Coronary artery disease due to lipid rich plaque  This is an established and stable " problem..   - continue bASA and statin.     2. Other chronic pain  I encouraged the patient to continue to reduce his opiate dosing and consider stopping the gabapentin. We'll also continue the Cymbalta.    3. Dyslipidemia  - continue statin.   - COMP METABOLIC PANEL; Future    4. Other fatigue  - await sleep study.   - consider stopping gabapentin.   - TSH WITH REFLEX TO FT4; Future    5. Essential hypertension  This is an established and stable problem.  - continue current regimen.   - COMP METABOLIC PANEL; Future        Followup: Return in about 3 months (around 12/19/2018), or if symptoms worsen or fail to improve.

## 2018-10-08 ENCOUNTER — APPOINTMENT (RX ONLY)
Dept: URBAN - METROPOLITAN AREA CLINIC 4 | Facility: CLINIC | Age: 78
Setting detail: DERMATOLOGY
End: 2018-10-08

## 2018-10-08 DIAGNOSIS — L57.8 OTHER SKIN CHANGES DUE TO CHRONIC EXPOSURE TO NONIONIZING RADIATION: ICD-10-CM

## 2018-10-08 DIAGNOSIS — D18.0 HEMANGIOMA: ICD-10-CM

## 2018-10-08 DIAGNOSIS — L57.0 ACTINIC KERATOSIS: ICD-10-CM

## 2018-10-08 DIAGNOSIS — L82.1 OTHER SEBORRHEIC KERATOSIS: ICD-10-CM

## 2018-10-08 DIAGNOSIS — L81.4 OTHER MELANIN HYPERPIGMENTATION: ICD-10-CM

## 2018-10-08 DIAGNOSIS — Z85.828 PERSONAL HISTORY OF OTHER MALIGNANT NEOPLASM OF SKIN: ICD-10-CM

## 2018-10-08 DIAGNOSIS — D22 MELANOCYTIC NEVI: ICD-10-CM

## 2018-10-08 PROBLEM — D22.5 MELANOCYTIC NEVI OF TRUNK: Status: ACTIVE | Noted: 2018-10-08

## 2018-10-08 PROBLEM — D18.01 HEMANGIOMA OF SKIN AND SUBCUTANEOUS TISSUE: Status: ACTIVE | Noted: 2018-10-08

## 2018-10-08 PROCEDURE — 99213 OFFICE O/P EST LOW 20 MIN: CPT | Mod: 25

## 2018-10-08 PROCEDURE — 17000 DESTRUCT PREMALG LESION: CPT

## 2018-10-08 PROCEDURE — ? COUNSELING

## 2018-10-08 PROCEDURE — ? LIQUID NITROGEN

## 2018-10-08 ASSESSMENT — LOCATION SIMPLE DESCRIPTION DERM
LOCATION SIMPLE: RIGHT CHEEK
LOCATION SIMPLE: LEFT HAND
LOCATION SIMPLE: LEFT UPPER BACK
LOCATION SIMPLE: RIGHT ANTERIOR NECK
LOCATION SIMPLE: LEFT FOREHEAD
LOCATION SIMPLE: RIGHT UPPER BACK
LOCATION SIMPLE: RIGHT HAND

## 2018-10-08 ASSESSMENT — LOCATION DETAILED DESCRIPTION DERM
LOCATION DETAILED: RIGHT INFERIOR MEDIAL UPPER BACK
LOCATION DETAILED: RIGHT INFERIOR CENTRAL MALAR CHEEK
LOCATION DETAILED: RIGHT INFERIOR ANTERIOR NECK
LOCATION DETAILED: LEFT SUPERIOR UPPER BACK
LOCATION DETAILED: RIGHT RADIAL DORSAL HAND
LOCATION DETAILED: LEFT RADIAL DORSAL HAND
LOCATION DETAILED: LEFT SUPERIOR LATERAL FOREHEAD
LOCATION DETAILED: RIGHT SUPERIOR UPPER BACK

## 2018-10-08 ASSESSMENT — LOCATION ZONE DERM
LOCATION ZONE: TRUNK
LOCATION ZONE: FACE
LOCATION ZONE: HAND
LOCATION ZONE: NECK

## 2018-10-08 NOTE — PROCEDURE: LIQUID NITROGEN
Detail Level: Simple
Render Post-Care Instructions In Note?: no
Duration Of Freeze Thaw-Cycle (Seconds): 3
Post-Care Instructions: I reviewed with the patient in detail post-care instructions. Patient is to wear sunprotection, and avoid picking at any of the treated lesions. Pt may apply Vaseline to crusted or scabbing areas.
Number Of Freeze-Thaw Cycles: 2 freeze-thaw cycles
Aperture Size (Optional): C
Consent: The patient's consent was obtained including but not limited to risks of crusting, scabbing, blistering, scarring, darker or lighter pigmentary change, recurrence, incomplete removal and infection.

## 2018-10-19 ENCOUNTER — HOSPITAL ENCOUNTER (OUTPATIENT)
Dept: HOSPITAL 8 - CFH | Age: 78
Discharge: HOME | End: 2018-10-19
Attending: NURSE PRACTITIONER
Payer: MEDICARE

## 2018-10-19 DIAGNOSIS — I25.10: Primary | ICD-10-CM

## 2018-10-19 DIAGNOSIS — R07.9: ICD-10-CM

## 2018-10-19 PROCEDURE — 78452 HT MUSCLE IMAGE SPECT MULT: CPT

## 2018-10-19 PROCEDURE — A9502 TC99M TETROFOSMIN: HCPCS

## 2018-10-19 PROCEDURE — 93017 CV STRESS TEST TRACING ONLY: CPT

## 2018-11-01 LAB
ALBUMIN SERPL-MCNC: 4.2 G/DL (ref 3.5–4.8)
ALBUMIN/GLOB SERPL: 1.7 {RATIO} (ref 1.2–2.2)
ALP SERPL-CCNC: 59 IU/L (ref 39–117)
ALT SERPL-CCNC: 32 IU/L (ref 0–44)
AST SERPL-CCNC: 22 IU/L (ref 0–40)
BILIRUB SERPL-MCNC: 0.7 MG/DL (ref 0–1.2)
BUN SERPL-MCNC: 18 MG/DL (ref 8–27)
BUN/CREAT SERPL: 14 (ref 10–24)
CALCIUM SERPL-MCNC: 10 MG/DL (ref 8.6–10.2)
CHLORIDE SERPL-SCNC: 105 MMOL/L (ref 96–106)
CO2 SERPL-SCNC: 21 MMOL/L (ref 20–29)
CREAT SERPL-MCNC: 1.27 MG/DL (ref 0.76–1.27)
GLOBULIN SER CALC-MCNC: 2.5 G/DL (ref 1.5–4.5)
GLUCOSE SERPL-MCNC: 138 MG/DL (ref 65–99)
IF AFRICAN AMERICAN  100797: 63 ML/MIN/1.73
IF NON AFRICAN AMER 100791: 55 ML/MIN/1.73
POTASSIUM SERPL-SCNC: 4.6 MMOL/L (ref 3.5–5.2)
PROT SERPL-MCNC: 6.7 G/DL (ref 6–8.5)
SODIUM SERPL-SCNC: 141 MMOL/L (ref 134–144)
T4 FREE SERPL-MCNC: 1 NG/DL (ref 0.82–1.77)
TSH SERPL DL<=0.005 MIU/L-ACNC: 5.46 UIU/ML (ref 0.45–4.5)

## 2018-11-09 ENCOUNTER — TELEPHONE (OUTPATIENT)
Dept: MEDICAL GROUP | Facility: MEDICAL CENTER | Age: 78
End: 2018-11-09

## 2018-11-09 NOTE — TELEPHONE ENCOUNTER
----- Message from Perry Canchola M.D. sent at 11/9/2018 12:19 PM PST -----  Please call and inform this patient of the following:  His sugars are a little up. We can discuss further at f/u appointment.

## 2018-12-08 NOTE — PROCEDURE: EXCISION
Purse String (Simple) Text: Given the location of the defect and the characteristics of the surrounding skin a purse string simple closure was deemed most appropriate.  Undermining was performed circumferentially around the surgical defect.  A purse string suture was then placed and tightened.
Telephone Encounter by Rossi Kendrick APN at 05/17/17 03:23 PM     Author:  Rossi Kendrick APN Service:  (none) Author Type:  Nurse Practitioner     Filed:  05/17/17 03:27 PM Encounter Date:  5/17/2017 Status:  Signed     :  Rossi Kendrick APN (Nurse Practitioner)            Please notify pt that rx is printed and she can pick it up at her convenience.[MN1.1M]      Revision History        User Key Date/Time User Provider Type Action    > MN1.1 05/17/17 03:27 PM Rossi Kendrick APN Nurse Practitioner Sign    M - Manual            
Perilesional Excision Additional Text (Leave Blank If You Do Not Want): The margin was drawn around the clinically apparent lesion. Incisions were then made along these lines to the appropriate tissue plane and the lesion was extirpated.
Lazy S Intermediate Repair Preamble Text (Leave Blank If You Do Not Want): Undermining was performed with blunt dissection.
Complex Repair And Skin Substitute Graft Text: The defect edges were debeveled with a #15 scalpel blade.  The primary defect was closed partially with a complex linear closure.  Given the location of the remaining defect, shape of the defect and the proximity to free margins a skin substitute graft was deemed most appropriate to repair the remaining defect.  The graft was trimmed to fit the size of the remaining defect.  The graft was then placed in the primary defect, oriented appropriately, and sutured into place.
W Plasty Text: The lesion was extirpated to the level of the fat with a #15 scalpel blade.  Given the location of the defect, shape of the defect and the proximity to free margins a W-plasty was deemed most appropriate for repair.  Using a sterile surgical marker, the appropriate transposition arms of the W-plasty were drawn incorporating the defect and placing the expected incisions within the relaxed skin tension lines where possible.    The area thus outlined was incised deep to adipose tissue with a #15 scalpel blade.  The skin margins were undermined to an appropriate distance in all directions utilizing iris scissors.  The opposing transposition arms were then transposed into place in opposite direction and anchored with interrupted buried subcutaneous sutures.
Complex Repair And M Plasty Text: The defect edges were debeveled with a #15 scalpel blade.  The primary defect was closed partially with a complex linear closure.  Given the location of the remaining defect, shape of the defect and the proximity to free margins an M plasty was deemed most appropriate for complete closure of the defect.  Using a sterile surgical marker, an appropriate advancement flap was drawn incorporating the defect and placing the expected incisions within the relaxed skin tension lines where possible.    The area thus outlined was incised deep to adipose tissue with a #15 scalpel blade.  The skin margins were undermined to an appropriate distance in all directions utilizing iris scissors.
Bilobed Transposition Flap Text: The defect edges were debeveled with a #15 scalpel blade.  Given the location of the defect and the proximity to free margins a bilobed transposition flap was deemed most appropriate.  Using a sterile surgical marker, an appropriate bilobe flap drawn around the defect.    The area thus outlined was incised deep to adipose tissue with a #15 scalpel blade.  The skin margins were undermined to an appropriate distance in all directions utilizing iris scissors.
Anesthesia Type: 1% lidocaine with epinephrine
Second Skin Substitute Units (Will Override Primary Defect Units If Greater Than 0): 0
Transposition Flap Text: The defect edges were debeveled with a #15 scalpel blade.  Given the location of the defect and the proximity to free margins a transposition flap was deemed most appropriate.  Using a sterile surgical marker, an appropriate transposition flap was drawn incorporating the defect.    The area thus outlined was incised deep to adipose tissue with a #15 scalpel blade.  The skin margins were undermined to an appropriate distance in all directions utilizing iris scissors.
Epidermal Sutures: 5-0 Vicryl Rapide
Positioning (Leave Blank If You Do Not Want): The patient was placed in a comfortable position exposing the surgical site.
Interpolation Flap Text: A decision was made to reconstruct the defect utilizing an interpolation axial flap and a staged reconstruction.  A telfa template was made of the defect.  This telfa template was then used to outline the interpolation flap.  The donor area for the pedicle flap was then injected with anesthesia.  The flap was excised through the skin and subcutaneous tissue down to the layer of the underlying musculature.  The interpolation flap was carefully excised within this deep plane to maintain its blood supply.  The edges of the donor site were undermined.   The donor site was closed in a primary fashion.  The pedicle was then rotated into position and sutured.  Once the tube was sutured into place, adequate blood supply was confirmed with blanching and refill.  The pedicle was then wrapped with xeroform gauze and dressed appropriately with a telfa and gauze bandage to ensure continued blood supply and protect the attached pedicle.
Undermining Location (Optional): in the superficial subcutaneous fat
Anesthesia Volume In Cc: 12
Show Additional Anesthesia Variables: Yes
Complex Repair And O-L Flap Text: The defect edges were debeveled with a #15 scalpel blade.  The primary defect was closed partially with a complex linear closure.  Given the location of the remaining defect, shape of the defect and the proximity to free margins an O-L flap was deemed most appropriate for complete closure of the defect.  Using a sterile surgical marker, an appropriate flap was drawn incorporating the defect and placing the expected incisions within the relaxed skin tension lines where possible.    The area thus outlined was incised deep to adipose tissue with a #15 scalpel blade.  The skin margins were undermined to an appropriate distance in all directions utilizing iris scissors.
Detail Level: Detailed
Epidermal Closure: running locked
O-T Plasty Text: The defect edges were debeveled with a #15 scalpel blade.  Given the location of the defect, shape of the defect and the proximity to free margins an O-T plasty was deemed most appropriate.  Using a sterile surgical marker, an appropriate O-T plasty was drawn incorporating the defect and placing the expected incisions within the relaxed skin tension lines where possible.    The area thus outlined was incised deep to adipose tissue with a #15 scalpel blade.  The skin margins were undermined to an appropriate distance in all directions utilizing iris scissors.
Fusiform Excision Additional Text (Leave Blank If You Do Not Want): The margin was drawn around the clinically apparent lesion.  A fusiform shape was then drawn on the skin incorporating the lesion and margins.  Incisions were then made along these lines to the appropriate tissue plane and the lesion was extirpated.
Size Of Lesion In Cm: 2.1
Complex Repair And Bilobe Flap Text: The defect edges were debeveled with a #15 scalpel blade.  The primary defect was closed partially with a complex linear closure.  Given the location of the remaining defect, shape of the defect and the proximity to free margins a bilobe flap was deemed most appropriate for complete closure of the defect.  Using a sterile surgical marker, an appropriate advancement flap was drawn incorporating the defect and placing the expected incisions within the relaxed skin tension lines where possible.    The area thus outlined was incised deep to adipose tissue with a #15 scalpel blade.  The skin margins were undermined to an appropriate distance in all directions utilizing iris scissors.
O-Z Plasty Text: The defect edges were debeveled with a #15 scalpel blade.  Given the location of the defect, shape of the defect and the proximity to free margins an O-Z plasty (double transposition flap) was deemed most appropriate.  Using a sterile surgical marker, the appropriate transposition flaps were drawn incorporating the defect and placing the expected incisions within the relaxed skin tension lines where possible.    The area thus outlined was incised deep to adipose tissue with a #15 scalpel blade.  The skin margins were undermined to an appropriate distance in all directions utilizing iris scissors.  Hemostasis was achieved with electrocautery.  The flaps were then transposed into place, one clockwise and the other counterclockwise, and anchored with interrupted buried subcutaneous sutures.
Additional Anesthesia Volume In Cc: 6
Complex Repair And V-Y Plasty Text: The defect edges were debeveled with a #15 scalpel blade.  The primary defect was closed partially with a complex linear closure.  Given the location of the remaining defect, shape of the defect and the proximity to free margins a V-Y plasty was deemed most appropriate for complete closure of the defect.  Using a sterile surgical marker, an appropriate advancement flap was drawn incorporating the defect and placing the expected incisions within the relaxed skin tension lines where possible.    The area thus outlined was incised deep to adipose tissue with a #15 scalpel blade.  The skin margins were undermined to an appropriate distance in all directions utilizing iris scissors.
Surgeon (Optional): Henry
Curvilinear Excision Additional Text (Leave Blank If You Do Not Want): The margin was drawn around the clinically apparent lesion.  A curvilinear shape was then drawn on the skin incorporating the lesion and margins.  Incisions were then made along these lines to the appropriate tissue plane and the lesion was extirpated.
Render Path Notes In Note?: no
Advancement-Rotation Flap Text: The defect edges were debeveled with a #15 scalpel blade.  Given the location of the defect, shape of the defect and the proximity to free margins an advancement-rotation flap was deemed most appropriate.  Using a sterile surgical marker, an appropriate flap was drawn incorporating the defect and placing the expected incisions within the relaxed skin tension lines where possible. The area thus outlined was incised deep to adipose tissue with a #15 scalpel blade.  The skin margins were undermined to an appropriate distance in all directions utilizing iris scissors.
Cartilage Graft Text: The defect edges were debeveled with a #15 scalpel blade.  Given the location of the defect, shape of the defect, the fact the defect involved a full thickness cartilage defect a cartilage graft was deemed most appropriate.  An appropriate donor site was identified, cleansed, and anesthetized. The cartilage graft was then harvested and transferred to the recipient site, oriented appropriately and then sutured into place.  The secondary defect was then repaired using a primary closure.
Skin Substitute Text: The defect edges were debeveled with a #15 scalpel blade.  Given the location of the defect, shape of the defect and the proximity to free margins a skin substitute graft was deemed most appropriate.  The graft material was trimmed to fit the size of the defect. The graft was then placed in the primary defect and oriented appropriately.
Complex Repair And Dorsal Nasal Flap Text: The defect edges were debeveled with a #15 scalpel blade.  The primary defect was closed partially with a complex linear closure.  Given the location of the remaining defect, shape of the defect and the proximity to free margins a dorsal nasal flap was deemed most appropriate for complete closure of the defect.  Using a sterile surgical marker, an appropriate flap was drawn incorporating the defect and placing the expected incisions within the relaxed skin tension lines where possible.    The area thus outlined was incised deep to adipose tissue with a #15 scalpel blade.  The skin margins were undermined to an appropriate distance in all directions utilizing iris scissors.
Pre-Excision Curettage Text (Leave Blank If You Do Not Want): Prior to drawing the surgical margin the visible lesion was removed with electrodesiccation and curettage to clearly define the lesion size.
Dorsal Nasal Flap Text: The defect edges were debeveled with a #15 scalpel blade.  Given the location of the defect and the proximity to free margins a dorsal nasal flap was deemed most appropriate.  Using a sterile surgical marker, an appropriate dorsal nasal flap was drawn around the defect.    The area thus outlined was incised deep to adipose tissue with a #15 scalpel blade.  The skin margins were undermined to an appropriate distance in all directions utilizing iris scissors.
Split-Thickness Skin Graft Text: The defect edges were debeveled with a #15 scalpel blade.  Given the location of the defect, shape of the defect and the proximity to free margins a split thickness skin graft was deemed most appropriate.  Using a sterile surgical marker, the primary defect shape was transferred to the donor site. The split thickness graft was then harvested.  The skin graft was then placed in the primary defect and oriented appropriately.
Bilobed Flap Text: The defect edges were debeveled with a #15 scalpel blade.  Given the location of the defect and the proximity to free margins a bilobe flap was deemed most appropriate.  Using a sterile surgical marker, an appropriate bilobe flap drawn around the defect.    The area thus outlined was incised deep to adipose tissue with a #15 scalpel blade.  The skin margins were undermined to an appropriate distance in all directions utilizing iris scissors.
Cheek-To-Nose Interpolation Flap Text: A decision was made to reconstruct the defect utilizing an interpolation axial flap and a staged reconstruction.  A telfa template was made of the defect.  This telfa template was then used to outline the Cheek-To-Nose Interpolation flap.  The donor area for the pedicle flap was then injected with anesthesia.  The flap was excised through the skin and subcutaneous tissue down to the layer of the underlying musculature.  The interpolation flap was carefully excised within this deep plane to maintain its blood supply.  The edges of the donor site were undermined.   The donor site was closed in a primary fashion.  The pedicle was then rotated into position and sutured.  Once the tube was sutured into place, adequate blood supply was confirmed with blanching and refill.  The pedicle was then wrapped with xeroform gauze and dressed appropriately with a telfa and gauze bandage to ensure continued blood supply and protect the attached pedicle.
Alar Island Pedicle Flap Text: The defect edges were debeveled with a #15 scalpel blade.  Given the location of the defect, shape of the defect and the proximity to the alar rim an island pedicle advancement flap was deemed most appropriate.  Using a sterile surgical marker, an appropriate advancement flap was drawn incorporating the defect, outlining the appropriate donor tissue and placing the expected incisions within the nasal ala running parallel to the alar rim. The area thus outlined was incised with a #15 scalpel blade.  The skin margins were undermined minimally to an appropriate distance in all directions around the primary defect and laterally outward around the island pedicle utilizing iris scissors.  There was minimal undermining beneath the pedicle flap.
Partial Purse String (Simple) Text: Given the location of the defect and the characteristics of the surrounding skin a simple purse string closure was deemed most appropriate.  Undermining was performed circumferentially around the surgical defect.  A purse string suture was then placed and tightened. Wound tension of the circular defect prevented complete closure of the wound.
Composite Graft Text: The defect edges were debeveled with a #15 scalpel blade.  Given the location of the defect, shape of the defect, the proximity to free margins and the fact the defect was full thickness a composite graft was deemed most appropriate.  The defect was outline and then transferred to the donor site.  A full thickness graft was then excised from the donor site. The graft was then placed in the primary defect, oriented appropriately and then sutured into place.  The secondary defect was then repaired using a primary closure.
Star Wedge Flap Text: The defect edges were debeveled with a #15 scalpel blade.  Given the location of the defect, shape of the defect and the proximity to free margins a star wedge flap was deemed most appropriate.  Using a sterile surgical marker, an appropriate rotation flap was drawn incorporating the defect and placing the expected incisions within the relaxed skin tension lines where possible. The area thus outlined was incised deep to adipose tissue with a #15 scalpel blade.  The skin margins were undermined to an appropriate distance in all directions utilizing iris scissors.
Muscle Hinge Flap Text: The defect edges were debeveled with a #15 scalpel blade.  Given the size, depth and location of the defect and the proximity to free margins a muscle hinge flap was deemed most appropriate.  Using a sterile surgical marker, an appropriate hinge flap was drawn incorporating the defect. The area thus outlined was incised with a #15 scalpel blade.  The skin margins were undermined to an appropriate distance in all directions utilizing iris scissors.
Cheek Interpolation Flap Text: A decision was made to reconstruct the defect utilizing an interpolation axial flap and a staged reconstruction.  A telfa template was made of the defect.  This telfa template was then used to outline the Cheek Interpolation flap.  The donor area for the pedicle flap was then injected with anesthesia.  The flap was excised through the skin and subcutaneous tissue down to the layer of the underlying musculature.  The interpolation flap was carefully excised within this deep plane to maintain its blood supply.  The edges of the donor site were undermined.   The donor site was closed in a primary fashion.  The pedicle was then rotated into position and sutured.  Once the tube was sutured into place, adequate blood supply was confirmed with blanching and refill.  The pedicle was then wrapped with xeroform gauze and dressed appropriately with a telfa and gauze bandage to ensure continued blood supply and protect the attached pedicle.
V-Y Flap Text: The defect edges were debeveled with a #15 scalpel blade.  Given the location of the defect, shape of the defect and the proximity to free margins a V-Y flap was deemed most appropriate.  Using a sterile surgical marker, an appropriate advancement flap was drawn incorporating the defect and placing the expected incisions within the relaxed skin tension lines where possible.    The area thus outlined was incised deep to adipose tissue with a #15 scalpel blade.  The skin margins were undermined to an appropriate distance in all directions utilizing iris scissors.
Partial Purse String (Intermediate) Text: Given the location of the defect and the characteristics of the surrounding skin an intermediate purse string closure was deemed most appropriate.  Undermining was performed circumferentially around the surgical defect.  A purse string suture was then placed and tightened. Wound tension of the circular defect prevented complete closure of the wound.
Complex Repair And Double Advancement Flap Text: The defect edges were debeveled with a #15 scalpel blade.  The primary defect was closed partially with a complex linear closure.  Given the location of the remaining defect, shape of the defect and the proximity to free margins a double advancement flap was deemed most appropriate for complete closure of the defect.  Using a sterile surgical marker, an appropriate advancement flap was drawn incorporating the defect and placing the expected incisions within the relaxed skin tension lines where possible.    The area thus outlined was incised deep to adipose tissue with a #15 scalpel blade.  The skin margins were undermined to an appropriate distance in all directions utilizing iris scissors.
Mucosal Advancement Flap Text: Given the location of the defect, shape of the defect and the proximity to free margins a mucosal advancement flap was deemed most appropriate. Incisions were made with a 15 blade scalpel in the appropriate fashion along the cutaneous vermilion border and the mucosal lip. The remaining actinically damaged mucosal tissue was excised.  The mucosal advancement flap was then elevated to the gingival sulcus with care taken to preserve the neurovascular structures and advanced into the primary defect. Care was taken to ensure that precise realignment of the vermilion border was achieved.
Trilobed Flap Text: The defect edges were debeveled with a #15 scalpel blade.  Given the location of the defect and the proximity to free margins a trilobed flap was deemed most appropriate.  Using a sterile surgical marker, an appropriate trilobed flap drawn around the defect.    The area thus outlined was incised deep to adipose tissue with a #15 scalpel blade.  The skin margins were undermined to an appropriate distance in all directions utilizing iris scissors.
Dermal Autograft Text: The defect edges were debeveled with a #15 scalpel blade.  Given the location of the defect, shape of the defect and the proximity to free margins a dermal autograft was deemed most appropriate.  Using a sterile surgical marker, the primary defect shape was transferred to the donor site. The area thus outlined was incised deep to adipose tissue with a #15 scalpel blade.  The harvested graft was then trimmed of adipose and epidermal tissue until only dermis was left.  The skin graft was then placed in the primary defect and oriented appropriately.
Post-Care Instructions: I reviewed with the patient in detail post-care instructions:\\n1. Apply bacitracin over the steri-strips.  \\n2. Cut non-stick pad (Telfa) to cover the steri-strips\\n3. Apply tape (hypafix) over the non-stick pad\\n4. Change once per day for 5 days\\n5. Shower with bandage on, change bandage after shower\\n\\nPatient is not to engage in any heavy lifting, exercise, hot tub, or swimming for the next 14 days. Should the patient develop any fevers, chills, bleeding, severe pain patient will contact the office immediately.
Repair Type: Complex
Elliptical Excision Additional Text (Leave Blank If You Do Not Want): The margin was drawn around the clinically apparent lesion.  An elliptical shape was then drawn on the skin incorporating the lesion and margins.  Incisions were then made along these lines to the appropriate tissue plane and the lesion was extirpated.
Epidermal Closure Graft Donor Site (Optional): simple interrupted
Epidermal Autograft Text: The defect edges were debeveled with a #15 scalpel blade.  Given the location of the defect, shape of the defect and the proximity to free margins an epidermal autograft was deemed most appropriate.  Using a sterile surgical marker, the primary defect shape was transferred to the donor site. The epidermal graft was then harvested.  The skin graft was then placed in the primary defect and oriented appropriately.
O-L Flap Text: The defect edges were debeveled with a #15 scalpel blade.  Given the location of the defect, shape of the defect and the proximity to free margins an O-L flap was deemed most appropriate.  Using a sterile surgical marker, an appropriate advancement flap was drawn incorporating the defect and placing the expected incisions within the relaxed skin tension lines where possible.    The area thus outlined was incised deep to adipose tissue with a #15 scalpel blade.  The skin margins were undermined to an appropriate distance in all directions utilizing iris scissors.
Hatchet Flap Text: The defect edges were debeveled with a #15 scalpel blade.  Given the location of the defect, shape of the defect and the proximity to free margins a hatchet flap was deemed most appropriate.  Using a sterile surgical marker, an appropriate hatchet flap was drawn incorporating the defect and placing the expected incisions within the relaxed skin tension lines where possible.    The area thus outlined was incised deep to adipose tissue with a #15 scalpel blade.  The skin margins were undermined to an appropriate distance in all directions utilizing iris scissors.
Complex Repair And Ftsg Text: The defect edges were debeveled with a #15 scalpel blade.  The primary defect was closed partially with a complex linear closure.  Given the location of the defect, shape of the defect and the proximity to free margins a full thickness skin graft was deemed most appropriate to repair the remaining defect.  The graft was trimmed to fit the size of the remaining defect.  The graft was then placed in the primary defect, oriented appropriately, and sutured into place.
Path Notes (To The Dermatopathologist): Please check margins.
Complex Repair Preamble Text (Leave Blank If You Do Not Want): Extensive wide undermining was performed.
Ear Star Wedge Flap Text: The defect edges were debeveled with a #15 blade scalpel.  Given the location of the defect and the proximity to free margins (helical rim) an ear star wedge flap was deemed most appropriate.  Using a sterile surgical marker, the appropriate flap was drawn incorporating the defect and placing the expected incisions between the helical rim and antihelix where possible.  The area thus outlined was incised through and through with a #15 scalpel blade.
Complex Repair And Z Plasty Text: The defect edges were debeveled with a #15 scalpel blade.  The primary defect was closed partially with a complex linear closure.  Given the location of the remaining defect, shape of the defect and the proximity to free margins a Z plasty was deemed most appropriate for complete closure of the defect.  Using a sterile surgical marker, an appropriate advancement flap was drawn incorporating the defect and placing the expected incisions within the relaxed skin tension lines where possible.    The area thus outlined was incised deep to adipose tissue with a #15 scalpel blade.  The skin margins were undermined to an appropriate distance in all directions utilizing iris scissors.
Repair Performed By Another Provider Text (Leave Blank If You Do Not Want): After the tissue was excised the defect was repaired by another provider.
Complex Repair And W Plasty Text: The defect edges were debeveled with a #15 scalpel blade.  The primary defect was closed partially with a complex linear closure.  Given the location of the remaining defect, shape of the defect and the proximity to free margins a W plasty was deemed most appropriate for complete closure of the defect.  Using a sterile surgical marker, an appropriate advancement flap was drawn incorporating the defect and placing the expected incisions within the relaxed skin tension lines where possible.    The area thus outlined was incised deep to adipose tissue with a #15 scalpel blade.  The skin margins were undermined to an appropriate distance in all directions utilizing iris scissors.
Complex Repair And Transposition Flap Text: The defect edges were debeveled with a #15 scalpel blade.  The primary defect was closed partially with a complex linear closure.  Given the location of the remaining defect, shape of the defect and the proximity to free margins a transposition flap was deemed most appropriate for complete closure of the defect.  Using a sterile surgical marker, an appropriate advancement flap was drawn incorporating the defect and placing the expected incisions within the relaxed skin tension lines where possible.    The area thus outlined was incised deep to adipose tissue with a #15 scalpel blade.  The skin margins were undermined to an appropriate distance in all directions utilizing iris scissors.
Consent was obtained from the patient. The risks and benefits to therapy were discussed in detail. Specifically, the risks of infection, scarring, bleeding, prolonged wound healing, incomplete removal, allergy to anesthesia, nerve injury and recurrence were addressed. Prior to the procedure, the treatment site was clearly identified and confirmed by the patient. All components of Universal Protocol/PAUSE Rule completed.
Complex Repair And Rhombic Flap Text: The defect edges were debeveled with a #15 scalpel blade.  The primary defect was closed partially with a complex linear closure.  Given the location of the remaining defect, shape of the defect and the proximity to free margins a rhombic flap was deemed most appropriate for complete closure of the defect.  Using a sterile surgical marker, an appropriate advancement flap was drawn incorporating the defect and placing the expected incisions within the relaxed skin tension lines where possible.    The area thus outlined was incised deep to adipose tissue with a #15 scalpel blade.  The skin margins were undermined to an appropriate distance in all directions utilizing iris scissors.
Excision Method: Elliptical
Lab: [id number]
Lip Wedge Excision Repair Text: Given the location of the defect and the proximity to free margins a full thickness wedge repair was deemed most appropriate.  Using a sterile surgical marker, the appropriate repair was drawn incorporating the defect and placing the expected incisions perpendicular to the vermilion border.  The vermilion border was also meticulously outlined to ensure appropriate reapproximation during the repair.  The area thus outlined was incised through and through with a #15 scalpel blade.  The muscularis and dermis were reaproximated with deep sutures following hemostasis. Care was taken to realign the vermilion border before proceeding with the superficial closure.  Once the vermilion was realigned the superfical and mucosal closure was finished.
Complex Repair And Tissue Cultured Epidermal Autograft Text: The defect edges were debeveled with a #15 scalpel blade.  The primary defect was closed partially with a complex linear closure.  Given the location of the defect, shape of the defect and the proximity to free margins an tissue cultured epidermal autograft was deemed most appropriate to repair the remaining defect.  The graft was trimmed to fit the size of the remaining defect.  The graft was then placed in the primary defect, oriented appropriately, and sutured into place.
Melolabial Transposition Flap Text: The defect edges were debeveled with a #15 scalpel blade.  Given the location of the defect and the proximity to free margins a melolabial flap was deemed most appropriate.  Using a sterile surgical marker, an appropriate melolabial transposition flap was drawn incorporating the defect.    The area thus outlined was incised deep to adipose tissue with a #15 scalpel blade.  The skin margins were undermined to an appropriate distance in all directions utilizing iris scissors.
Purse String (Intermediate) Text: Given the location of the defect and the characteristics of the surrounding skin a purse string intermediate closure was deemed most appropriate.  Undermining was performed circumfirentially around the surgical defect.  A purse string suture was then placed and tightened.
Excisional Biopsy Additional Text (Leave Blank If You Do Not Want): The margin was drawn around the clinically apparent lesion. An elliptical shape was then drawn on the skin incorporating the lesion and margins.  Incisions were then made along these lines to the appropriate tissue plane and the lesion was extirpated.
Double Island Pedicle Flap Text: The defect edges were debeveled with a #15 scalpel blade.  Given the location of the defect, shape of the defect and the proximity to free margins a double island pedicle advancement flap was deemed most appropriate.  Using a sterile surgical marker, an appropriate advancement flap was drawn incorporating the defect, outlining the appropriate donor tissue and placing the expected incisions within the relaxed skin tension lines where possible.    The area thus outlined was incised deep to adipose tissue with a #15 scalpel blade.  The skin margins were undermined to an appropriate distance in all directions around the primary defect and laterally outward around the island pedicle utilizing iris scissors.  There was minimal undermining beneath the pedicle flap.
A-T Advancement Flap Text: The defect edges were debeveled with a #15 scalpel blade.  Given the location of the defect, shape of the defect and the proximity to free margins an A-T advancement flap was deemed most appropriate.  Using a sterile surgical marker, an appropriate advancement flap was drawn incorporating the defect and placing the expected incisions within the relaxed skin tension lines where possible.    The area thus outlined was incised deep to adipose tissue with a #15 scalpel blade.  The skin margins were undermined to an appropriate distance in all directions utilizing iris scissors.
Ftsg Text: The defect edges were debeveled with a #15 scalpel blade.  Given the location of the defect, shape of the defect and the proximity to free margins a full thickness skin graft was deemed most appropriate.  Using a sterile surgical marker, the primary defect shape was transferred to the donor site. The area thus outlined was incised deep to adipose tissue with a #15 scalpel blade.  The harvested graft was then trimmed of adipose tissue until only dermis and epidermis was left.  The skin margins of the secondary defect were undermined to an appropriate distance in all directions utilizing iris scissors.  The secondary defect was closed with interrupted buried subcutaneous sutures.  The skin edges were then re-apposed with running  sutures.  The skin graft was then placed in the primary defect and oriented appropriately.
Burow's Advancement Flap Text: The defect edges were debeveled with a #15 scalpel blade.  Given the location of the defect and the proximity to free margins a Burow's advancement flap was deemed most appropriate.  Using a sterile surgical marker, the appropriate advancement flap was drawn incorporating the defect and placing the expected incisions within the relaxed skin tension lines where possible.    The area thus outlined was incised deep to adipose tissue with a #15 scalpel blade.  The skin margins were undermined to an appropriate distance in all directions utilizing iris scissors.
Crescentic Advancement Flap Text: The defect edges were debeveled with a #15 scalpel blade.  Given the location of the defect and the proximity to free margins a crescentic advancement flap was deemed most appropriate.  Using a sterile surgical marker, the appropriate advancement flap was drawn incorporating the defect and placing the expected incisions within the relaxed skin tension lines where possible.    The area thus outlined was incised deep to adipose tissue with a #15 scalpel blade.  The skin margins were undermined to an appropriate distance in all directions utilizing iris scissors.
Complex Repair And Split-Thickness Skin Graft Text: The defect edges were debeveled with a #15 scalpel blade.  The primary defect was closed partially with a complex linear closure.  Given the location of the defect, shape of the defect and the proximity to free margins a split thickness skin graft was deemed most appropriate to repair the remaining defect.  The graft was trimmed to fit the size of the remaining defect.  The graft was then placed in the primary defect, oriented appropriately, and sutured into place.
S Plasty Text: Given the location and shape of the defect, and the orientation of relaxed skin tension lines, an S-plasty was deemed most appropriate for repair.  Using a sterile surgical marker, the appropriate outline of the S-plasty was drawn, incorporating the defect and placing the expected incisions within the relaxed skin tension lines where possible.  The area thus outlined was incised deep to adipose tissue with a #15 scalpel blade.  The skin margins were undermined to an appropriate distance in all directions utilizing iris scissors. The skin flaps were advanced over the defect.  The opposing margins were then approximated with interrupted buried subcutaneous sutures.
V-Y Plasty Text: The defect edges were debeveled with a #15 scalpel blade.  Given the location of the defect, shape of the defect and the proximity to free margins an V-Y advancement flap was deemed most appropriate.  Using a sterile surgical marker, an appropriate advancement flap was drawn incorporating the defect and placing the expected incisions within the relaxed skin tension lines where possible.    The area thus outlined was incised deep to adipose tissue with a #15 scalpel blade.  The skin margins were undermined to an appropriate distance in all directions utilizing iris scissors.
Estimated Blood Loss (Cc): minimal
O-T Advancement Flap Text: The defect edges were debeveled with a #15 scalpel blade.  Given the location of the defect, shape of the defect and the proximity to free margins an O-T advancement flap was deemed most appropriate.  Using a sterile surgical marker, an appropriate advancement flap was drawn incorporating the defect and placing the expected incisions within the relaxed skin tension lines where possible.    The area thus outlined was incised deep to adipose tissue with a #15 scalpel blade.  The skin margins were undermined to an appropriate distance in all directions utilizing iris scissors.
Complex Repair And Modified Advancement Flap Text: The defect edges were debeveled with a #15 scalpel blade.  The primary defect was closed partially with a complex linear closure.  Given the location of the remaining defect, shape of the defect and the proximity to free margins a modified advancement flap was deemed most appropriate for complete closure of the defect.  Using a sterile surgical marker, an appropriate advancement flap was drawn incorporating the defect and placing the expected incisions within the relaxed skin tension lines where possible.    The area thus outlined was incised deep to adipose tissue with a #15 scalpel blade.  The skin margins were undermined to an appropriate distance in all directions utilizing iris scissors.
Lab Facility: 
No Repair - Repaired With Adjacent Surgical Defect Text (Leave Blank If You Do Not Want): After the excision the defect was repaired concurrently with another surgical defect which was in close approximation.
Bilateral Helical Rim Advancement Flap Text: The defect edges were debeveled with a #15 blade scalpel.  Given the location of the defect and the proximity to free margins (helical rim) a bilateral helical rim advancement flap was deemed most appropriate.  Using a sterile surgical marker, the appropriate advancement flaps were drawn incorporating the defect and placing the expected incisions between the helical rim and antihelix where possible.  The area thus outlined was incised through and through with a #15 scalpel blade.  With a skin hook and iris scissors, the flaps were gently and sharply undermined and freed up.
Z Plasty Text: The lesion was extirpated to the level of the fat with a #15 scalpel blade.  Given the location of the defect, shape of the defect and the proximity to free margins a Z-plasty was deemed most appropriate for repair.  Using a sterile surgical marker, the appropriate transposition arms of the Z-plasty were drawn incorporating the defect and placing the expected incisions within the relaxed skin tension lines where possible.    The area thus outlined was incised deep to adipose tissue with a #15 scalpel blade.  The skin margins were undermined to an appropriate distance in all directions utilizing iris scissors.  The opposing transposition arms were then transposed into place in opposite direction and anchored with interrupted buried subcutaneous sutures.
Repair Anesthesia Method: local infiltration
Previous Accession (Optional): [id number].
Rotation Flap Text: The defect edges were debeveled with a #15 scalpel blade.  Given the location of the defect, shape of the defect and the proximity to free margins a rotation flap was deemed most appropriate.  Using a sterile surgical marker, an appropriate rotation flap was drawn incorporating the defect and placing the expected incisions within the relaxed skin tension lines where possible.    The area thus outlined was incised deep to adipose tissue with a #15 scalpel blade.  The skin margins were undermined to an appropriate distance in all directions utilizing iris scissors.
Home Suture Removal Text: Patient was provided a home suture removal kit and will remove their sutures at home.  If they have any questions or difficulties they will call the office.
Excision Depth: adipose tissue
Mastoid Interpolation Flap Text: A decision was made to reconstruct the defect utilizing an interpolation axial flap and a staged reconstruction.  A telfa template was made of the defect.  This telfa template was then used to outline the mastoid interpolation flap.  The donor area for the pedicle flap was then injected with anesthesia.  The flap was excised through the skin and subcutaneous tissue down to the layer of the underlying musculature.  The pedicle flap was carefully excised within this deep plane to maintain its blood supply.  The edges of the donor site were undermined.   The donor site was closed in a primary fashion.  The pedicle was then rotated into position and sutured.  Once the tube was sutured into place, adequate blood supply was confirmed with blanching and refill.  The pedicle was then wrapped with xeroform gauze and dressed appropriately with a telfa and gauze bandage to ensure continued blood supply and protect the attached pedicle.
Island Pedicle Flap Text: The defect edges were debeveled with a #15 scalpel blade.  Given the location of the defect, shape of the defect and the proximity to free margins an island pedicle advancement flap was deemed most appropriate.  Using a sterile surgical marker, an appropriate advancement flap was drawn incorporating the defect, outlining the appropriate donor tissue and placing the expected incisions within the relaxed skin tension lines where possible.    The area thus outlined was incised deep to adipose tissue with a #15 scalpel blade.  The skin margins were undermined to an appropriate distance in all directions around the primary defect and laterally outward around the island pedicle utilizing iris scissors.  There was minimal undermining beneath the pedicle flap.
Hemostasis: Electrocautery
Complex Repair And Melolabial Flap Text: The defect edges were debeveled with a #15 scalpel blade.  The primary defect was closed partially with a complex linear closure.  Given the location of the remaining defect, shape of the defect and the proximity to free margins a melolabial flap was deemed most appropriate for complete closure of the defect.  Using a sterile surgical marker, an appropriate advancement flap was drawn incorporating the defect and placing the expected incisions within the relaxed skin tension lines where possible.    The area thus outlined was incised deep to adipose tissue with a #15 scalpel blade.  The skin margins were undermined to an appropriate distance in all directions utilizing iris scissors.
Advancement Flap (Single) Text: The defect edges were debeveled with a #15 scalpel blade.  Given the location of the defect and the proximity to free margins a single advancement flap was deemed most appropriate.  Using a sterile surgical marker, an appropriate advancement flap was drawn incorporating the defect and placing the expected incisions within the relaxed skin tension lines where possible.    The area thus outlined was incised deep to adipose tissue with a #15 scalpel blade.  The skin margins were undermined to an appropriate distance in all directions utilizing iris scissors.
Keystone Flap Text: The defect edges were debeveled with a #15 scalpel blade.  Given the location of the defect, shape of the defect a keystone flap was deemed most appropriate.  Using a sterile surgical marker, an appropriate keystone flap was drawn incorporating the defect, outlining the appropriate donor tissue and placing the expected incisions within the relaxed skin tension lines where possible. The area thus outlined was incised deep to adipose tissue with a #15 scalpel blade.  The skin margins were undermined to an appropriate distance in all directions around the primary defect and laterally outward around the flap utilizing iris scissors.
Rhombic Flap Text: The defect edges were debeveled with a #15 scalpel blade.  Given the location of the defect and the proximity to free margins a rhombic flap was deemed most appropriate.  Using a sterile surgical marker, an appropriate rhombic flap was drawn incorporating the defect.    The area thus outlined was incised deep to adipose tissue with a #15 scalpel blade.  The skin margins were undermined to an appropriate distance in all directions utilizing iris scissors.
Complex Repair And Xenograft Text: The defect edges were debeveled with a #15 scalpel blade.  The primary defect was closed partially with a complex linear closure.  Given the location of the defect, shape of the defect and the proximity to free margins a xenograft was deemed most appropriate to repair the remaining defect.  The graft was trimmed to fit the size of the remaining defect.  The graft was then placed in the primary defect, oriented appropriately, and sutured into place.
Xenograft Text: The defect edges were debeveled with a #15 scalpel blade.  Given the location of the defect, shape of the defect and the proximity to free margins a xenograft was deemed most appropriate.  The graft was then trimmed to fit the size of the defect.  The graft was then placed in the primary defect and oriented appropriately.
Tissue Cultured Epidermal Autograft Text: The defect edges were debeveled with a #15 scalpel blade.  Given the location of the defect, shape of the defect and the proximity to free margins a tissue cultured epidermal autograft was deemed most appropriate.  The graft was then trimmed to fit the size of the defect.  The graft was then placed in the primary defect and oriented appropriately.
Intermediate / Complex Repair - Final Wound Length In Cm: 5.1
Complex Repair And Dermal Autograft Text: The defect edges were debeveled with a #15 scalpel blade.  The primary defect was closed partially with a complex linear closure.  Given the location of the defect, shape of the defect and the proximity to free margins an dermal autograft was deemed most appropriate to repair the remaining defect.  The graft was trimmed to fit the size of the remaining defect.  The graft was then placed in the primary defect, oriented appropriately, and sutured into place.
Slit Excision Additional Text (Leave Blank If You Do Not Want): A linear line was drawn on the skin overlying the lesion. An incision was made slowly until the lesion was visualized.  Once visualized, the lesion was removed with blunt dissection.
Dermal Closure: buried vertical mattress
Saucerization Excision Additional Text (Leave Blank If You Do Not Want): The margin was drawn around the clinically apparent lesion.  Incisions were then made along these lines, in a tangential fashion, to the appropriate tissue plane and the lesion was extirpated.
Posterior Auricular Interpolation Flap Text: A decision was made to reconstruct the defect utilizing an interpolation axial flap and a staged reconstruction.  A telfa template was made of the defect.  This telfa template was then used to outline the posterior auricular interpolation flap.  The donor area for the pedicle flap was then injected with anesthesia.  The flap was excised through the skin and subcutaneous tissue down to the layer of the underlying musculature.  The pedicle flap was carefully excised within this deep plane to maintain its blood supply.  The edges of the donor site were undermined.   The donor site was closed in a primary fashion.  The pedicle was then rotated into position and sutured.  Once the tube was sutured into place, adequate blood supply was confirmed with blanching and refill.  The pedicle was then wrapped with xeroform gauze and dressed appropriately with a telfa and gauze bandage to ensure continued blood supply and protect the attached pedicle.
Complex Repair And Epidermal Autograft Text: The defect edges were debeveled with a #15 scalpel blade.  The primary defect was closed partially with a complex linear closure.  Given the location of the defect, shape of the defect and the proximity to free margins an epidermal autograft was deemed most appropriate to repair the remaining defect.  The graft was trimmed to fit the size of the remaining defect.  The graft was then placed in the primary defect, oriented appropriately, and sutured into place.
Size Of Margin In Cm: 0.2
H Plasty Text: Given the location of the defect, shape of the defect and the proximity to free margins a H-plasty was deemed most appropriate for repair.  Using a sterile surgical marker, the appropriate advancement arms of the H-plasty were drawn incorporating the defect and placing the expected incisions within the relaxed skin tension lines where possible. The area thus outlined was incised deep to adipose tissue with a #15 scalpel blade. The skin margins were undermined to an appropriate distance in all directions utilizing iris scissors.  The opposing advancement arms were then advanced into place in opposite direction and anchored with interrupted buried subcutaneous sutures.
Graft Donor Site Bandage (Optional-Leave Blank If You Don't Want In Note): Steri-strips and a pressure bandage were applied to the donor site.
Dressing: dry sterile dressing
Complex Repair And A-T Advancement Flap Text: The defect edges were debeveled with a #15 scalpel blade.  The primary defect was closed partially with a complex linear closure.  Given the location of the remaining defect, shape of the defect and the proximity to free margins an A-T advancement flap was deemed most appropriate for complete closure of the defect.  Using a sterile surgical marker, an appropriate advancement flap was drawn incorporating the defect and placing the expected incisions within the relaxed skin tension lines where possible.    The area thus outlined was incised deep to adipose tissue with a #15 scalpel blade.  The skin margins were undermined to an appropriate distance in all directions utilizing iris scissors.
Complex Repair And O-T Advancement Flap Text: The defect edges were debeveled with a #15 scalpel blade.  The primary defect was closed partially with a complex linear closure.  Given the location of the remaining defect, shape of the defect and the proximity to free margins an O-T advancement flap was deemed most appropriate for complete closure of the defect.  Using a sterile surgical marker, an appropriate advancement flap was drawn incorporating the defect and placing the expected incisions within the relaxed skin tension lines where possible.    The area thus outlined was incised deep to adipose tissue with a #15 scalpel blade.  The skin margins were undermined to an appropriate distance in all directions utilizing iris scissors.
Wound Care: Bacitracin
Billing Type: Third-Party Bill
Island Pedicle Flap With Canthal Suspension Text: The defect edges were debeveled with a #15 scalpel blade.  Given the location of the defect, shape of the defect and the proximity to free margins an island pedicle advancement flap was deemed most appropriate.  Using a sterile surgical marker, an appropriate advancement flap was drawn incorporating the defect, outlining the appropriate donor tissue and placing the expected incisions within the relaxed skin tension lines where possible. The area thus outlined was incised deep to adipose tissue with a #15 scalpel blade.  The skin margins were undermined to an appropriate distance in all directions around the primary defect and laterally outward around the island pedicle utilizing iris scissors.  There was minimal undermining beneath the pedicle flap. A suspension suture was placed in the canthal tendon to prevent tension and prevent ectropion.
Helical Rim Advancement Flap Text: The defect edges were debeveled with a #15 blade scalpel.  Given the location of the defect and the proximity to free margins (helical rim) a double helical rim advancement flap was deemed most appropriate.  Using a sterile surgical marker, the appropriate advancement flaps were drawn incorporating the defect and placing the expected incisions between the helical rim and antihelix where possible.  The area thus outlined was incised through and through with a #15 scalpel blade.  With a skin hook and iris scissors, the flaps were gently and sharply undermined and freed up.
Advancement Flap (Double) Text: The defect edges were debeveled with a #15 scalpel blade.  Given the location of the defect and the proximity to free margins a double advancement flap was deemed most appropriate.  Using a sterile surgical marker, the appropriate advancement flaps were drawn incorporating the defect and placing the expected incisions within the relaxed skin tension lines where possible.    The area thus outlined was incised deep to adipose tissue with a #15 scalpel blade.  The skin margins were undermined to an appropriate distance in all directions utilizing iris scissors.
Melolabial Interpolation Flap Text: A decision was made to reconstruct the defect utilizing an interpolation axial flap and a staged reconstruction.  A telfa template was made of the defect.  This telfa template was then used to outline the melolabial interpolation flap.  The donor area for the pedicle flap was then injected with anesthesia.  The flap was excised through the skin and subcutaneous tissue down to the layer of the underlying musculature.  The pedicle flap was carefully excised within this deep plane to maintain its blood supply.  The edges of the donor site were undermined.   The donor site was closed in a primary fashion.  The pedicle was then rotated into position and sutured.  Once the tube was sutured into place, adequate blood supply was confirmed with blanching and refill.  The pedicle was then wrapped with xeroform gauze and dressed appropriately with a telfa and gauze bandage to ensure continued blood supply and protect the attached pedicle.
Bi-Rhombic Flap Text: The defect edges were debeveled with a #15 scalpel blade.  Given the location of the defect and the proximity to free margins a bi-rhombic flap was deemed most appropriate.  Using a sterile surgical marker, an appropriate rhombic flap was drawn incorporating the defect. The area thus outlined was incised deep to adipose tissue with a #15 scalpel blade.  The skin margins were undermined to an appropriate distance in all directions utilizing iris scissors.
Island Pedicle Flap-Requiring Vessel Identification Text: The defect edges were debeveled with a #15 scalpel blade.  Given the location of the defect, shape of the defect and the proximity to free margins an island pedicle advancement flap was deemed most appropriate.  Using a sterile surgical marker, an appropriate advancement flap was drawn, based on the axial vessel mentioned above, incorporating the defect, outlining the appropriate donor tissue and placing the expected incisions within the relaxed skin tension lines where possible.    The area thus outlined was incised deep to adipose tissue with a #15 scalpel blade.  The skin margins were undermined to an appropriate distance in all directions around the primary defect and laterally outward around the island pedicle utilizing iris scissors.  There was minimal undermining beneath the pedicle flap.
Modified Advancement Flap Text: The defect edges were debeveled with a #15 scalpel blade.  Given the location of the defect, shape of the defect and the proximity to free margins a modified advancement flap was deemed most appropriate.  Using a sterile surgical marker, an appropriate advancement flap was drawn incorporating the defect and placing the expected incisions within the relaxed skin tension lines where possible.    The area thus outlined was incised deep to adipose tissue with a #15 scalpel blade.  The skin margins were undermined to an appropriate distance in all directions utilizing iris scissors.
Scalpel Size: 15 blade
Deep Sutures: 2-0 Vicryl
Complex Repair And Double M Plasty Text: The defect edges were debeveled with a #15 scalpel blade.  The primary defect was closed partially with a complex linear closure.  Given the location of the remaining defect, shape of the defect and the proximity to free margins a double M plasty was deemed most appropriate for complete closure of the defect.  Using a sterile surgical marker, an appropriate advancement flap was drawn incorporating the defect and placing the expected incisions within the relaxed skin tension lines where possible.    The area thus outlined was incised deep to adipose tissue with a #15 scalpel blade.  The skin margins were undermined to an appropriate distance in all directions utilizing iris scissors.
Spiral Flap Text: The defect edges were debeveled with a #15 scalpel blade.  Given the location of the defect, shape of the defect and the proximity to free margins a spiral flap was deemed most appropriate.  Using a sterile surgical marker, an appropriate rotation flap was drawn incorporating the defect and placing the expected incisions within the relaxed skin tension lines where possible. The area thus outlined was incised deep to adipose tissue with a #15 scalpel blade.  The skin margins were undermined to an appropriate distance in all directions utilizing iris scissors.
Paramedian Forehead Flap Text: A decision was made to reconstruct the defect utilizing an interpolation axial flap and a staged reconstruction.  A telfa template was made of the defect.  This telfa template was then used to outline the paramedian forehead pedicle flap.  The donor area for the pedicle flap was then injected with anesthesia.  The flap was excised through the skin and subcutaneous tissue down to the layer of the underlying musculature.  The pedicle flap was carefully excised within this deep plane to maintain its blood supply.  The edges of the donor site were undermined.   The donor site was closed in a primary fashion.  The pedicle was then rotated into position and sutured.  Once the tube was sutured into place, adequate blood supply was confirmed with blanching and refill.  The pedicle was then wrapped with xeroform gauze and dressed appropriately with a telfa and gauze bandage to ensure continued blood supply and protect the attached pedicle.
Complex Repair And Rotation Flap Text: The defect edges were debeveled with a #15 scalpel blade.  The primary defect was closed partially with a complex linear closure.  Given the location of the remaining defect, shape of the defect and the proximity to free margins a rotation flap was deemed most appropriate for complete closure of the defect.  Using a sterile surgical marker, an appropriate advancement flap was drawn incorporating the defect and placing the expected incisions within the relaxed skin tension lines where possible.    The area thus outlined was incised deep to adipose tissue with a #15 scalpel blade.  The skin margins were undermined to an appropriate distance in all directions utilizing iris scissors.
Complex Repair And Single Advancement Flap Text: The defect edges were debeveled with a #15 scalpel blade.  The primary defect was closed partially with a complex linear closure.  Given the location of the remaining defect, shape of the defect and the proximity to free margins a single advancement flap was deemed most appropriate for complete closure of the defect.  Using a sterile surgical marker, an appropriate advancement flap was drawn incorporating the defect and placing the expected incisions within the relaxed skin tension lines where possible.    The area thus outlined was incised deep to adipose tissue with a #15 scalpel blade.  The skin margins were undermined to an appropriate distance in all directions utilizing iris scissors.

## 2018-12-23 ENCOUNTER — OFFICE VISIT (OUTPATIENT)
Dept: URGENT CARE | Facility: PHYSICIAN GROUP | Age: 78
End: 2018-12-23
Payer: MEDICARE

## 2018-12-23 VITALS
HEART RATE: 85 BPM | TEMPERATURE: 97.7 F | RESPIRATION RATE: 16 BRPM | BODY MASS INDEX: 32.58 KG/M2 | OXYGEN SATURATION: 95 % | WEIGHT: 215 LBS | HEIGHT: 68 IN

## 2018-12-23 DIAGNOSIS — J06.9 VIRAL URI WITH COUGH: ICD-10-CM

## 2018-12-23 PROCEDURE — 99214 OFFICE O/P EST MOD 30 MIN: CPT | Performed by: FAMILY MEDICINE

## 2018-12-23 RX ORDER — BENZONATATE 200 MG/1
200 CAPSULE ORAL 3 TIMES DAILY PRN
Qty: 45 CAP | Refills: 0 | Status: SHIPPED | OUTPATIENT
Start: 2018-12-23 | End: 2019-03-25

## 2018-12-24 NOTE — PROGRESS NOTES
CC:  cough        Cough  This is a new problem. The current episode started 2 days ago. The problem has been unchanged. The problem occurs constantly. The cough is dry. Associated symptoms include : nasal congestion, but denies any:  fatigue, muscle aches, fever. Pertinent negatives include no   headaches, nausea, vomiting, diarrhea, sweats, weight loss or wheezing. Nothing aggravates the symptoms.  Patient has tried nothing for the symptoms. There is no history of asthma.        Past Medical History:   Diagnosis Date   • Personal history of venous thrombosis and embolism 2004    left arm   • Acute MI (HCC) 1997    cardiologist, Dr. Hernández   • Allergy    • Arthritis    • Cancer (HCC)     Skin   • High cholesterol    • Hyperlipidemia    • Hypertension    • Muscle disorder    • Rectal abscess    • Rheumatic fever as child   • Sleep apnea     CPAP   • Stroke (HCC)    • Unspecified cataract     surgical correction bilateral         Social History   Substance Use Topics   • Smoking status: Never Smoker   • Smokeless tobacco: Never Used   • Alcohol use Yes      Comment: Occasional         Current Outpatient Prescriptions on File Prior to Visit   Medication Sig Dispense Refill   • metoprolol SR (TOPROL XL) 25 MG TABLET SR 24 HR Take 0.5 Tabs by mouth 2 Times a Day. 180 Tab 0   • DULoxetine (CYMBALTA) 30 MG Cap DR Particles Take 1 PO daily except take 2 PO every third day. 108 Cap 4   • lisinopril-hydrochlorothiazide (PRINZIDE, ZESTORETIC) 20-12.5 MG per tablet Take 1 Tab by mouth every day. 30 Tab 0   • meloxicam (MOBIC) 15 MG tablet Take 1 Tab by mouth every day. 30 Tab 0   • gabapentin (NEURONTIN) 100 MG Cap 1 Cap every bedtime. 90 Cap 0   • atorvastatin (LIPITOR) 80 MG tablet      • tramadol (ULTRAM) 50 MG Tab Take 50 mg by mouth every four hours as needed.     • HYDROmorphone (DILAUDID) 2 MG Tab Take 2 mg by mouth 2 times a day as needed for Severe Pain.     • aspirin (ASA) 81 MG Chew Tab chewable tablet Take 1 Tab by  "mouth every day. 100 Tab 0   • Cholecalciferol (VITAMIN D) 2000 UNIT CAPS Take 2,000 Units by mouth every day.     • modafinil (PROVIGIL) 200 MG Tab Take 200 mg by mouth every day.       No current facility-administered medications on file prior to visit.                     Review of Systems   Constitutional: Negative for fever and weight loss.   HENT: negative for otalgia  Cardiovascular - denies chest pain or dyspnea  Respiratory: Positive for cough.  .  Negative for wheezing.    Neurological: Negative for headaches.   GI - denies nausea, vomiting or diarrhea  Neuro - denies numbness or tingling.            Objective:     Pulse 85, temperature 36.5 °C (97.7 °F), temperature source Temporal, resp. rate 16, height 1.727 m (5' 8\"), weight 97.5 kg (215 lb), SpO2 95 %.    Physical Exam   Constitutional: patient is oriented to person, place, and time. Patient appears well-developed and well-nourished. No distress.   HENT:   Head: Normocephalic and atraumatic.   Right Ear: External ear normal.   Left Ear: External ear normal.   Nose: Mucosal edema  present. Right sinus exhibits no maxillary sinus tenderness. Left sinus exhibits no maxillary sinus tenderness.  No postnasal drip noted.   Mouth/Throat: Mucous membranes are normal. No oral lesions.  No posterior pharyngeal erythema.  No oropharyngeal exudate or posterior oropharyngeal edema.   Eyes: Conjunctivae and EOM are normal. Pupils are equal, round, and reactive to light. Right eye exhibits no discharge. Left eye exhibits no discharge. No scleral icterus.   Neck: Normal range of motion. Neck supple. No tracheal deviation present.   Cardiovascular: Normal rate, regular rhythm and normal heart sounds.  Exam reveals no friction rub.    Pulmonary/Chest: Effort normal. No respiratory distress. Patient has no wheezes or rhonchi. Patient has no rales.    Musculoskeletal:  exhibits no edema.   Lymphadenopathy:     Patient has no cervical adenopathy.      Neurological: " patient is alert and oriented to person, place, and time.   Skin: Skin is warm and dry. No rash noted. No erythema.   Psychiatric: patient  has a normal mood and affect.  behavior is normal.   Nursing note and vitals reviewed.              Assessment/Plan:       1. Viral URI with cough     - benzonatate (TESSALON) 200 MG capsule; Take 1 Cap by mouth 3 times a day as needed for Cough.  Dispense: 45 Cap; Refill: 0       Follow up in one week if no improvement

## 2019-01-08 ENCOUNTER — OFFICE VISIT (OUTPATIENT)
Dept: MEDICAL GROUP | Facility: MEDICAL CENTER | Age: 79
End: 2019-01-08
Payer: MEDICARE

## 2019-01-08 VITALS
TEMPERATURE: 97.6 F | BODY MASS INDEX: 35.16 KG/M2 | HEART RATE: 69 BPM | OXYGEN SATURATION: 95 % | WEIGHT: 224 LBS | RESPIRATION RATE: 16 BRPM | DIASTOLIC BLOOD PRESSURE: 68 MMHG | SYSTOLIC BLOOD PRESSURE: 122 MMHG | HEIGHT: 67 IN

## 2019-01-08 DIAGNOSIS — R05.9 COUGH: ICD-10-CM

## 2019-01-08 PROCEDURE — 99213 OFFICE O/P EST LOW 20 MIN: CPT | Performed by: FAMILY MEDICINE

## 2019-01-08 ASSESSMENT — PATIENT HEALTH QUESTIONNAIRE - PHQ9: CLINICAL INTERPRETATION OF PHQ2 SCORE: 0

## 2019-01-08 NOTE — PROGRESS NOTES
[facility]  Progress Note  Established Patient    Subjective:   Amarjit Khan is a 78 y.o. male here today with a chief complaint of cough. The patient is alone.     Cough  Patient states that he has had a dry, mild severity cough for the past 6 weeks.  It has not improved or worsened.  He does notice some associated fatigue.  He also feels bilateral L fullness and rhinorrhea.  He did use Tessalon Perles with some benefit.  He has not tried anything else.  There is no associated shortness of breath.  He has been on an ACE inhibitor for many years, preceding the cough.      Current Outpatient Prescriptions on File Prior to Visit   Medication Sig Dispense Refill   • metoprolol SR (TOPROL XL) 25 MG TABLET SR 24 HR Take 0.5 Tabs by mouth 2 Times a Day. 180 Tab 0   • DULoxetine (CYMBALTA) 30 MG Cap DR Particles Take 1 PO daily except take 2 PO every third day. (Patient taking differently: Take 30 mg by mouth every day.) 108 Cap 4   • lisinopril-hydrochlorothiazide (PRINZIDE, ZESTORETIC) 20-12.5 MG per tablet Take 1 Tab by mouth every day. 30 Tab 0   • meloxicam (MOBIC) 15 MG tablet Take 1 Tab by mouth every day. 30 Tab 0   • gabapentin (NEURONTIN) 100 MG Cap 1 Cap every bedtime. 90 Cap 0   • atorvastatin (LIPITOR) 80 MG tablet Take 40 mg by mouth every day.     • HYDROmorphone (DILAUDID) 2 MG Tab Take 1 mg by mouth 2 times a day as needed for Severe Pain.     • aspirin (ASA) 81 MG Chew Tab chewable tablet Take 1 Tab by mouth every day. 100 Tab 0   • benzonatate (TESSALON) 200 MG capsule Take 1 Cap by mouth 3 times a day as needed for Cough. (Patient not taking: Reported on 1/8/2019) 45 Cap 0   • modafinil (PROVIGIL) 200 MG Tab Take 200 mg by mouth every day.     • tramadol (ULTRAM) 50 MG Tab Take 50 mg by mouth every four hours as needed.     • Cholecalciferol (VITAMIN D) 2000 UNIT CAPS Take 2,000 Units by mouth every day.       No current facility-administered medications on file prior to visit.         Past Medical History:   Diagnosis Date   • Acute MI ([facility]) 1997    cardiologist, Dr. Hernández   • Allergy    • Arthritis    • Cancer (HCC)     Skin   • High cholesterol    • Hyperlipidemia    • Hypertension    • Muscle disorder    • Personal history of venous thrombosis and embolism 2004    left arm   • Rectal abscess    • Rheumatic fever as child   • Sleep apnea     CPAP   • Stroke (HCC)    • Unspecified cataract     surgical correction bilateral       Allergies: Butrans [buprenorphine]; Codeine; Darvocet [propoxyphene n-apap]; Fentanyl; Nucynta [tapentadol]; Percocet [oxycodone-acetaminophen]; Ambien [zolpidem]; Biaxin [clarithromycin]; Buspar [buspirone]; Celexa; Citalopram; Clindamycin; Clonazepam; Demerol; Diltiazem; Doxepin; Effexor [venlafaxine]; Erythromycin; Haldol [haloperidol]; Hydrocodone; Lexapro; Lunesta; Lyrica; Morphine sulfate [bupropion]; Remeron [mirtazapine]; Serzone [nefazodone]; Tape; Valium; Xanax [alprazolam]; and Zyprexa    Surgical History:  has a past surgical history that includes anal fistulotomy (8/27/08); pr percut implnt neuroelect,epidural (7/15/2015); pr percut implnt neuroelect,epidural (7/15/2015); cataract extraction with iol (Bilateral, 2010); stent placement (2005,2010); knee arthrotomy (Right, 1990); knee arthrotomy (Right, 1992); knee arthroscopy (Left, 1995); nerve ulnar transfer (Right, 2004); elbow arthrotomy (Left, 1998); shoulder arthrotomy (Right, 2006); lumbar laminectomy diskectomy (1988); hip arthroplasty total (Left, 2/2015); spinal cord stimulator (7/29/2015); and eye surgery.    Family History: family history includes Diabetes in his mother; Heart Disease in his mother; Hyperlipidemia in his mother.    Social History:  reports that he has never smoked. He has never used smokeless tobacco. He reports that he drinks alcohol. He reports that he does not use drugs.    ROS: see HPI.        Objective:     Vitals:    01/08/19 1146   BP: 122/68   BP Location: Left arm  "  Patient Position: Sitting   BP Cuff Size: Large adult   Pulse: 69   Resp: 16   Temp: 36.4 °C (97.6 °F)   TempSrc: Temporal   SpO2: 95%   Weight: 101.6 kg (224 lb)   Height: 1.702 m (5' 7\")       Physical Exam:  General: alert in no apparent distress.   Cardio: regular rate and rhythm, no murmurs, rubs or gallops.   Resp: CTAB no w/r/r.   ENMT: Tympanic members normal bilaterally.  Unable to visualize posterior pharynx due to oropharyngeal crowding.        Assessment and Plan:     1. Cough  This is likely a post viral cough.  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  He should also use Flonase, Netie pot and humidifier.  If his symptoms do not 100% resolved within 2 weeks, I instructed the patient to get a chest x-ray and let me know.  - DX-CHEST-2 VIEWS; Future    Followup: Return if symptoms worsen or fail to improve.         "

## 2019-01-08 NOTE — LETTER
[facility]  Perry Canchola M.D.  [address]  [place] [address]  Fax: [phone]   Authorization for Release/Disclosure of   Protected Health Information   Name: AMARJIT MOREIRA : 1940 SSN: [id number]   Address: [address] [place] [address] Phone:    [phone] (home)    I authorize the entity listed below to release/disclose the PHI below to:   [facility]/Perry Canchola M.D. and Perry Canchola M.D.   Provider or Entity Name:  [facility]   City, State, [place]   Phone:[phone]    Fax:[phone]   Reason for request: continuity of care   Information to be released:    [  ] LAST COLONOSCOPY,  including any PATH REPORT and follow-up  [  ] LAST FIT/COLOGUARD RESULT [  ] LAST DEXA  [  ] LAST MAMMOGRAM  [  ] LAST PAP  [  ] LAST LABS [  ] RETINA EXAM REPORT  [  ] IMMUNIZATION RECORDS  [  ] Release all info      [  ] Check here and initial the line next to each item to release ALL health information INCLUDING  _____ Care and treatment for drug and / or alcohol abuse  _____ HIV testing, infection status, or AIDS  _____ Genetic Testing    DATES OF SERVICE OR TIME PERIOD TO BE DISCLOSED: _____________  I understand and acknowledge that:  * This Authorization may be revoked at any time by you in writing, except if your health information has already been used or disclosed.  * Your health information that will be used or disclosed as a result of you signing this authorization could be re-disclosed by the recipient. If this occurs, your re-disclosed health information may no longer be protected by State or Federal laws.  * You may refuse to sign this Authorization. Your refusal will not affect your ability to obtain treatment.  * This Authorization becomes effective upon signing and will  on (date) __________.      If no date is indicated, this Authorization will  one (1) year from the signature date.    Name: Amarjit Moreira    Signature:   Date:             PLEASE FAX REQUESTED RECORDS BACK TO: [phone]

## 2019-01-08 NOTE — ASSESSMENT & PLAN NOTE
Patient states that he has had a dry, mild severity cough for the past 6 weeks.  It has not improved or worsened.  He does notice some associated fatigue.  He also feels bilateral L fullness and rhinorrhea.  He did use Tessalon Perles with some benefit.  He has not tried anything else.  There is no associated shortness of breath.  He has been on an ACE inhibitor for many years, preceding the cough.

## 2019-01-08 NOTE — PATIENT INSTRUCTIONS
1. NetiPot every night before bed. Blow your nose.     2. Then use Flonase 1 spray each nostril.     3. Use a humidifer while you sleep.     4. Start Mucinex DM. Take 1/2 recommended dose.     5. If your cough hasn't resolved within 2 weeks, get a chest x-ray.

## 2019-03-25 ENCOUNTER — OFFICE VISIT (OUTPATIENT)
Dept: MEDICAL GROUP | Facility: MEDICAL CENTER | Age: 79
End: 2019-03-25
Payer: MEDICARE

## 2019-03-25 VITALS
WEIGHT: 231 LBS | DIASTOLIC BLOOD PRESSURE: 60 MMHG | HEART RATE: 64 BPM | HEIGHT: 67 IN | TEMPERATURE: 96.7 F | BODY MASS INDEX: 36.26 KG/M2 | RESPIRATION RATE: 18 BRPM | OXYGEN SATURATION: 96 % | SYSTOLIC BLOOD PRESSURE: 102 MMHG

## 2019-03-25 DIAGNOSIS — Z12.12 SCREENING FOR COLORECTAL CANCER: ICD-10-CM

## 2019-03-25 DIAGNOSIS — R53.83 OTHER FATIGUE: ICD-10-CM

## 2019-03-25 DIAGNOSIS — Z12.11 SCREENING FOR COLORECTAL CANCER: ICD-10-CM

## 2019-03-25 DIAGNOSIS — I25.10 CORONARY ARTERY DISEASE DUE TO LIPID RICH PLAQUE: ICD-10-CM

## 2019-03-25 DIAGNOSIS — R73.9 ELEVATED BLOOD SUGAR: ICD-10-CM

## 2019-03-25 DIAGNOSIS — R05.9 COUGH: ICD-10-CM

## 2019-03-25 DIAGNOSIS — I25.83 CORONARY ARTERY DISEASE DUE TO LIPID RICH PLAQUE: ICD-10-CM

## 2019-03-25 DIAGNOSIS — R94.4 DECREASED GFR: ICD-10-CM

## 2019-03-25 DIAGNOSIS — I10 ESSENTIAL HYPERTENSION: ICD-10-CM

## 2019-03-25 DIAGNOSIS — E03.9 HYPOTHYROIDISM, UNSPECIFIED TYPE: ICD-10-CM

## 2019-03-25 PROCEDURE — 99214 OFFICE O/P EST MOD 30 MIN: CPT | Performed by: FAMILY MEDICINE

## 2019-03-25 RX ORDER — LEVOTHYROXINE SODIUM 0.03 MG/1
25 TABLET ORAL
Qty: 45 TAB | Refills: 1 | Status: SHIPPED | OUTPATIENT
Start: 2019-03-25 | End: 2019-05-07

## 2019-03-25 NOTE — ASSESSMENT & PLAN NOTE
Patient has a mildly elevated TSH with normal free T4 on past lab testing.  He does describe some significant fatigue.

## 2019-03-25 NOTE — ASSESSMENT & PLAN NOTE
Patient continues to complain of fatigue.  His cardiologist is aware of these complaints.  He is aggressively treating his sleep apnea but is not sure if this is helping much.  He is not currently maintained on modafinil but has used this medication in the past, without known success.  He does have some subclinical hypothyroidism.

## 2019-03-25 NOTE — ASSESSMENT & PLAN NOTE
Noted on past labs.  The patient's ophthalmologist was concerned about some retinopathy changes and wanted him tested for diabetes.

## 2019-03-25 NOTE — ASSESSMENT & PLAN NOTE
Patient's blood pressure is well controlled on his current medication regimen.  He is reluctant to de-escalate therapy.

## 2019-03-25 NOTE — PROGRESS NOTES
[facility]  Progress Note  Established Patient    Subjective:   Amarjit Khan is a 78 y.o. male here today with a chief complaint of fatigue.     CAD (coronary artery disease)  The patient has coronary artery disease. He follows with cardiology. He is currently maintained on baby aspirin and atorvastatin 80 mg daily. He denies chest pain and shortness of breath.  His cardiologist is aware of his recent complaints regarding fatigue.    Cough  This has resolved.    Decreased GFR  Noted on past labs.    Elevated blood sugar  Noted on past labs.  The patient's ophthalmologist was concerned about some retinopathy changes and wanted him tested for diabetes.    Fatigue  Patient continues to complain of fatigue.  His cardiologist is aware of these complaints.  He is aggressively treating his sleep apnea but is not sure if this is helping much.  He is not currently maintained on modafinil but has used this medication in the past, without known success.  He does have some subclinical hypothyroidism.    HTN (hypertension)  Patient's blood pressure is well controlled on his current medication regimen.  He is reluctant to de-escalate therapy.     Hypothyroidism  Patient has a mildly elevated TSH with normal free T4 on past lab testing.  He does describe some significant fatigue.      Current Outpatient Prescriptions on File Prior to Visit   Medication Sig Dispense Refill   • metoprolol SR (TOPROL XL) 25 MG TABLET SR 24 HR Take 0.5 Tabs by mouth 2 Times a Day. 180 Tab 0   • DULoxetine (CYMBALTA) 30 MG Cap DR Particles Take 1 PO daily except take 2 PO every third day. (Patient taking differently: Take 30 mg by mouth every day.) 108 Cap 4   • lisinopril-hydrochlorothiazide (PRINZIDE, ZESTORETIC) 20-12.5 MG per tablet Take 1 Tab by mouth every day. 30 Tab 0   • meloxicam (MOBIC) 15 MG tablet Take 1 Tab by mouth every day. 30 Tab 0   • gabapentin (NEURONTIN) 100 MG Cap 1 Cap every bedtime. 90 Cap 0   • atorvastatin  (LIPITOR) 80 MG tablet Take 40 mg by mouth every day.     • HYDROmorphone (DILAUDID) 2 MG Tab Take 1 mg by mouth 2 times a day as needed for Severe Pain.     • aspirin (ASA) 81 MG Chew Tab chewable tablet Take 1 Tab by mouth every day. 100 Tab 0   • modafinil (PROVIGIL) 200 MG Tab Take 200 mg by mouth every day.     • tramadol (ULTRAM) 50 MG Tab Take 50 mg by mouth every four hours as needed.     • Cholecalciferol (VITAMIN D) 2000 UNIT CAPS Take 2,000 Units by mouth every day.       No current facility-administered medications on file prior to visit.        Past Medical History:   Diagnosis Date   • Acute MI (HCC) 1997    cardiologist, Dr. Hernández   • Allergy    • Arthritis    • Cancer (HCC)     Skin   • High cholesterol    • Hyperlipidemia    • Hypertension    • Muscle disorder    • Personal history of venous thrombosis and embolism 2004    left arm   • Rectal abscess    • Rheumatic fever as child   • Sleep apnea     CPAP   • Stroke (HCC)    • Unspecified cataract     surgical correction bilateral       Allergies: Butrans [buprenorphine]; Codeine; Darvocet [propoxyphene n-apap]; Fentanyl; Nucynta [tapentadol]; Percocet [oxycodone-acetaminophen]; Ambien [zolpidem]; Biaxin [clarithromycin]; Buspar [buspirone]; Celexa; Citalopram; Clindamycin; Clonazepam; Demerol; Diltiazem; Doxepin; Effexor [venlafaxine]; Erythromycin; Haldol [haloperidol]; Hydrocodone; Lexapro; Lunesta; Lyrica; Morphine sulfate [bupropion]; Remeron [mirtazapine]; Serzone [nefazodone]; Tape; Valium; Xanax [alprazolam]; and Zyprexa    Surgical History:  has a past surgical history that includes anal fistulotomy (8/27/08); pr percut implnt neuroelect,epidural (7/15/2015); pr percut implnt neuroelect,epidural (7/15/2015); cataract extraction with iol (Bilateral, 2010); stent placement (2005,2010); knee arthrotomy (Right, 1990); knee arthrotomy (Right, 1992); knee arthroscopy (Left, 1995); nerve ulnar transfer (Right, 2004); elbow arthrotomy (Left, 1998);  "shoulder arthrotomy (Right, 2006); lumbar laminectomy diskectomy (1988); hip arthroplasty total (Left, 2/2015); spinal cord stimulator (7/29/2015); and eye surgery.    Family History: family history includes Diabetes in his mother; Heart Disease in his mother; Hyperlipidemia in his mother.    Social History:  reports that he has never smoked. He has never used smokeless tobacco. He reports that he drinks alcohol. He reports that he does not use drugs.    ROS: no CP or SOB.        Objective:     Vitals:    03/25/19 1453   BP: 102/60   BP Location: Right arm   Patient Position: Sitting   BP Cuff Size: Large adult   Pulse: 64   Resp: 18   Temp: 35.9 °C (96.7 °F)   TempSrc: Temporal   SpO2: 96%   Weight: 104.8 kg (231 lb)   Height: 1.702 m (5' 7\")       Physical Exam:  General: alert in no apparent distress.   Cardio: regular rate and rhythm, no murmurs, rubs or gallops. No pedal edema.   Resp: CTAB no w/r/r.         Assessment and Plan:     1. Other fatigue  Although he has serologic subclinical hypothyroidism, I discussed with him that sometimes we can replace thyroid hormone and fatigue may improve in this setting.  The patient would like to try.  We discussed risks of thyroid hormone replacement including heart attacks, strokes and arrhythmia.  Again, the patient would like to try the medicine.  I will start him on a very low dose.    2. Decreased GFR  - Basic Metabolic Panel; Future    3. Cough  - resolved.     4. Coronary artery disease due to lipid rich plaque  - continue bASA and statin.   - f/u cardiology.     5. Essential hypertension  - continue current regimen.   - Basic Metabolic Panel; Future    6. Hypothyroidism, unspecified type  Symptomatic subclinical hypothyroidism. See above for further discussion.   - levothyroxine (SYNTHROID) 25 MCG Tab; Take 1 Tab by mouth every Monday, Wednesday, and Friday.  Dispense: 45 Tab; Refill: 1  - TSH; Future    7. Elevated blood sugar  - BMP.          Followup: Return " in about 2 months (around 5/25/2019), or if symptoms worsen or fail to improve.

## 2019-05-03 LAB
BUN SERPL-MCNC: 25 MG/DL (ref 8–27)
BUN/CREAT SERPL: 22 (ref 10–24)
CALCIUM SERPL-MCNC: 9.3 MG/DL (ref 8.6–10.2)
CHLORIDE SERPL-SCNC: 105 MMOL/L (ref 96–106)
CO2 SERPL-SCNC: 22 MMOL/L (ref 20–29)
CREAT SERPL-MCNC: 1.12 MG/DL (ref 0.76–1.27)
GLUCOSE SERPL-MCNC: 134 MG/DL (ref 65–99)
POTASSIUM SERPL-SCNC: 4.3 MMOL/L (ref 3.5–5.2)
SODIUM SERPL-SCNC: 142 MMOL/L (ref 134–144)
TSH SERPL DL<=0.005 MIU/L-ACNC: 2.69 UIU/ML (ref 0.45–4.5)

## 2019-05-07 ENCOUNTER — OFFICE VISIT (OUTPATIENT)
Dept: MEDICAL GROUP | Facility: MEDICAL CENTER | Age: 79
End: 2019-05-07
Payer: MEDICARE

## 2019-05-07 VITALS
DIASTOLIC BLOOD PRESSURE: 68 MMHG | WEIGHT: 232 LBS | SYSTOLIC BLOOD PRESSURE: 112 MMHG | TEMPERATURE: 96.9 F | RESPIRATION RATE: 18 BRPM | HEART RATE: 80 BPM | OXYGEN SATURATION: 96 % | HEIGHT: 67 IN | BODY MASS INDEX: 36.41 KG/M2

## 2019-05-07 DIAGNOSIS — Z12.11 SCREENING FOR COLORECTAL CANCER: ICD-10-CM

## 2019-05-07 DIAGNOSIS — R73.9 ELEVATED BLOOD SUGAR: ICD-10-CM

## 2019-05-07 DIAGNOSIS — Z12.12 SCREENING FOR COLORECTAL CANCER: ICD-10-CM

## 2019-05-07 DIAGNOSIS — R94.4 DECREASED GFR: ICD-10-CM

## 2019-05-07 DIAGNOSIS — E03.9 HYPOTHYROIDISM, UNSPECIFIED TYPE: ICD-10-CM

## 2019-05-07 DIAGNOSIS — Z12.11 COLON CANCER SCREENING: ICD-10-CM

## 2019-05-07 DIAGNOSIS — G89.29 OTHER CHRONIC PAIN: ICD-10-CM

## 2019-05-07 DIAGNOSIS — R53.83 OTHER FATIGUE: ICD-10-CM

## 2019-05-07 DIAGNOSIS — G47.33 OBSTRUCTIVE SLEEP APNEA SYNDROME: ICD-10-CM

## 2019-05-07 LAB
HBA1C MFR BLD: 6.4 % (ref 0–5.6)
INT CON NEG: NEGATIVE
INT CON POS: POSITIVE

## 2019-05-07 PROCEDURE — 83036 HEMOGLOBIN GLYCOSYLATED A1C: CPT | Performed by: FAMILY MEDICINE

## 2019-05-07 PROCEDURE — 99214 OFFICE O/P EST MOD 30 MIN: CPT | Performed by: FAMILY MEDICINE

## 2019-05-07 RX ORDER — DULOXETIN HYDROCHLORIDE 60 MG/1
60 CAPSULE, DELAYED RELEASE ORAL DAILY
Qty: 90 CAP | Refills: 0
Start: 2019-05-07 | End: 2021-07-09 | Stop reason: SDUPTHER

## 2019-05-07 RX ORDER — MELOXICAM 15 MG/1
15 TABLET ORAL
Qty: 90 TAB | Refills: 0 | Status: SHIPPED | OUTPATIENT
Start: 2019-05-07 | End: 2020-06-02 | Stop reason: SDUPTHER

## 2019-05-07 NOTE — ASSESSMENT & PLAN NOTE
Patient continues to follow with pain management for this issue.  He does not think gabapentin is helping him very much and he describes some mental processing difficulty which he thinks might be related to medications.  He also describes some fatigue.  He is interested in stopping this medicine.

## 2019-05-07 NOTE — ASSESSMENT & PLAN NOTE
Patient has a subclinical hypothyroidism nonresponsive to thyroid supplementation.  His TSH normalized but his symptoms of fatigue did not improve.

## 2019-05-07 NOTE — PROGRESS NOTES
[facility]  Progress Note  Established Patient    Subjective:   Amarjit Khan is a 79 y.o. male here today with a chief complaint of fatigue. The patient is alone.     Obstructive sleep apnea syndrome  The patient remains on CPAP for his obstructive sleep apnea.    Chronic pain  Patient continues to follow with pain management for this issue.  He does not think gabapentin is helping him very much and he describes some mental processing difficulty which he thinks might be related to medications.  He also describes some fatigue.  He is interested in stopping this medicine.     Fatigue  Patient's fatigue persist.  He thinks it might be related to gabapentin.  He would like to stop this medicine.  Thyroid supplementation did not help his fatigue.  He continues on CPAP for obstructive sleep apnea.    Decreased GFR  This issue has resolved.    Hypothyroidism  Patient has a subclinical hypothyroidism nonresponsive to thyroid supplementation.  His TSH normalized but his symptoms of fatigue did not improve.    Elevated blood sugar  Patient continues to have an elevated blood sugar with an A1c today of 6.4.      Current Outpatient Prescriptions on File Prior to Visit   Medication Sig Dispense Refill   • metoprolol SR (TOPROL XL) 25 MG TABLET SR 24 HR Take 0.5 Tabs by mouth 2 Times a Day. 180 Tab 0   • lisinopril-hydrochlorothiazide (PRINZIDE, ZESTORETIC) 20-12.5 MG per tablet Take 1 Tab by mouth every day. 30 Tab 0   • atorvastatin (LIPITOR) 80 MG tablet Take 40 mg by mouth every day.     • tramadol (ULTRAM) 50 MG Tab Take 50 mg by mouth every four hours as needed.     • HYDROmorphone (DILAUDID) 2 MG Tab Take 1 mg by mouth 2 times a day as needed for Severe Pain.     • aspirin (ASA) 81 MG Chew Tab chewable tablet Take 1 Tab by mouth every day. 100 Tab 0   • Cholecalciferol (VITAMIN D) 2000 UNIT CAPS Take 2,000 Units by mouth every day.       No current facility-administered medications on file prior to visit.         Past Medical History:   Diagnosis Date   • Acute MI ([facility]) 1997    cardiologist, Dr. Hernández   • Allergy    • Arthritis    • Cancer (HCC)     Skin   • High cholesterol    • Hyperlipidemia    • Hypertension    • Muscle disorder    • Personal history of venous thrombosis and embolism 2004    left arm   • Rectal abscess    • Rheumatic fever as child   • Sleep apnea     CPAP   • Stroke (HCC)    • Unspecified cataract     surgical correction bilateral       Allergies: Butrans [buprenorphine]; Codeine; Darvocet [propoxyphene n-apap]; Fentanyl; Nucynta [tapentadol]; Percocet [oxycodone-acetaminophen]; Ambien [zolpidem]; Biaxin [clarithromycin]; Buspar [buspirone]; Celexa; Citalopram; Clindamycin; Clonazepam; Demerol; Diltiazem; Doxepin; Effexor [venlafaxine]; Erythromycin; Haldol [haloperidol]; Hydrocodone; Lexapro; Lunesta; Lyrica; Morphine sulfate [bupropion]; Remeron [mirtazapine]; Serzone [nefazodone]; Tape; Valium; Xanax [alprazolam]; and Zyprexa    Surgical History:  has a past surgical history that includes anal fistulotomy (8/27/08); pr percut implnt neuroelect,epidural (7/15/2015); pr percut implnt neuroelect,epidural (7/15/2015); cataract extraction with iol (Bilateral, 2010); stent placement (2005,2010); knee arthrotomy (Right, 1990); knee arthrotomy (Right, 1992); knee arthroscopy (Left, 1995); nerve ulnar transfer (Right, 2004); elbow arthrotomy (Left, 1998); shoulder arthrotomy (Right, 2006); lumbar laminectomy diskectomy (1988); hip arthroplasty total (Left, 2/2015); spinal cord stimulator (7/29/2015); and eye surgery.    Family History: family history includes Diabetes in his mother; Heart Disease in his mother; Hyperlipidemia in his mother.    Social History:  reports that he has never smoked. He has never used smokeless tobacco. He reports that he drinks alcohol. He reports that he does not use drugs.    ROS: no fever or nausea.        Objective:     Vitals:    05/07/19 1422   BP: 112/68   BP Location:  "Right arm   Patient Position: Sitting   BP Cuff Size: Large adult   Pulse: 80   Resp: 18   Temp: 36.1 °C (96.9 °F)   TempSrc: Temporal   SpO2: 96%   Weight: 105.2 kg (232 lb)   Height: 1.702 m (5' 7\")       Physical Exam:  General: alert in no apparent distress.   Neuro: CN II - XII intact, finger to nose normal, gait normal. Minicog normal.         Assessment and Plan:     1. Screening for colorectal cancer  - FIT.     2. Other fatigue  - stop gabapentin.   - DULoxetine (CYMBALTA) 60 MG Cap DR Particles delayed-release capsule; Take 1 Cap by mouth every day.  Dispense: 90 Cap; Refill: 0    3. Decreased GFR  - resolved.     4. Hypothyroidism, unspecified type  Subclinical. No improvement on Synthroid.   - stop Synthroid.     5. Elevated blood sugar  I informed the patient that he is moving into diabetes territory.  We discussed the importance of diet and exercise.  We will continue to monitor this.  - POCT  A1C    6. Colon cancer screening  - OCCULT BLOOD FECES IMMUNOASSAY (FIT); Future    7. Other chronic pain  Dicussed judicious use of mobic and need to take breaks. Discussed risk of heart attack and stroke.   - f/u pain management.   - DULoxetine (CYMBALTA) 60 MG Cap DR Particles delayed-release capsule; Take 1 Cap by mouth every day.  Dispense: 90 Cap; Refill: 0  - meloxicam (MOBIC) 15 MG tablet; Take 1 Tab by mouth 1 time daily as needed for Moderate Pain.  Dispense: 90 Tab; Refill: 0    8. Obstructive sleep apnea syndrome  - continue CPAP.         Followup: Return in about 6 months (around 11/7/2019), or if symptoms worsen or fail to improve.         "

## 2019-05-07 NOTE — ASSESSMENT & PLAN NOTE
Patient's fatigue persist.  He thinks it might be related to gabapentin.  He would like to stop this medicine.  Thyroid supplementation did not help his fatigue.  He continues on CPAP for obstructive sleep apnea.

## 2019-05-10 ENCOUNTER — HOSPITAL ENCOUNTER (OUTPATIENT)
Facility: MEDICAL CENTER | Age: 79
End: 2019-05-10
Attending: PHYSICIAN ASSISTANT
Payer: MEDICARE

## 2019-05-10 PROCEDURE — 82274 ASSAY TEST FOR BLOOD FECAL: CPT

## 2019-05-17 DIAGNOSIS — Z12.11 COLON CANCER SCREENING: ICD-10-CM

## 2019-05-17 LAB — HEMOCCULT STL QL IA: NEGATIVE

## 2019-11-11 ENCOUNTER — OFFICE VISIT (OUTPATIENT)
Dept: MEDICAL GROUP | Facility: MEDICAL CENTER | Age: 79
End: 2019-11-11
Payer: MEDICARE

## 2019-11-11 VITALS
WEIGHT: 230 LBS | HEART RATE: 77 BPM | TEMPERATURE: 98.3 F | DIASTOLIC BLOOD PRESSURE: 78 MMHG | HEIGHT: 67 IN | OXYGEN SATURATION: 95 % | BODY MASS INDEX: 36.1 KG/M2 | RESPIRATION RATE: 18 BRPM | SYSTOLIC BLOOD PRESSURE: 118 MMHG

## 2019-11-11 DIAGNOSIS — I25.83 CORONARY ARTERY DISEASE DUE TO LIPID RICH PLAQUE: ICD-10-CM

## 2019-11-11 DIAGNOSIS — Z23 NEED FOR VACCINATION: ICD-10-CM

## 2019-11-11 DIAGNOSIS — M94.0 COSTOCHONDRITIS: ICD-10-CM

## 2019-11-11 DIAGNOSIS — I10 ESSENTIAL HYPERTENSION: ICD-10-CM

## 2019-11-11 DIAGNOSIS — R73.9 ELEVATED BLOOD SUGAR: ICD-10-CM

## 2019-11-11 DIAGNOSIS — I25.10 CORONARY ARTERY DISEASE DUE TO LIPID RICH PLAQUE: ICD-10-CM
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
## 2019-11-11 DIAGNOSIS — E78.5 DYSLIPIDEMIA: ICD-10-CM

## 2019-11-11 PROCEDURE — G0008 ADMIN INFLUENZA VIRUS VAC: HCPCS | Performed by: FAMILY MEDICINE

## 2019-11-11 PROCEDURE — 99214 OFFICE O/P EST MOD 30 MIN: CPT | Mod: 25 | Performed by: FAMILY MEDICINE

## 2019-11-11 PROCEDURE — 99999 PR NO CHARGE: CPT | Performed by: FAMILY MEDICINE

## 2019-11-11 PROCEDURE — 90662 IIV NO PRSV INCREASED AG IM: CPT | Performed by: FAMILY MEDICINE

## 2019-11-11 PROCEDURE — 93000 ELECTROCARDIOGRAM COMPLETE: CPT | Performed by: FAMILY MEDICINE

## 2019-11-11 NOTE — PROGRESS NOTES
"[facility]  Progress Note  Established Patient    Subjective:   Amarjit Khan is a 79 y.o. male here today with a chief complaint of falls.     Falls  Patient states that for the past 3 or 4 months he has had 3 separate \"falls\".  He states the most recent one was a product of feeling a little bit dizzy but the other ones just \"happened\".  Of note, the patient is maintained on aggressive blood pressure control as well as chronic opiate therapy.  There is no syncope.  He denies associated chest pain or palpitations.    CAD (coronary artery disease)  The patient has coronary artery disease. He follows with cardiology. He is currently maintained on baby aspirin and atorvastatin 80 mg daily.    Costochondritis  Patient describes a long-standing history of intermittent, sharp left parasternal pain.  It is not associated with activity.  It can come at any time.  There is no associated nausea, sweating, shortness of breath.  It is reproducible.    Dyslipidemia  Patient's dyslipidemia is controlled with atorvastatin.    Elevated blood sugar  Noted on past labs.    HTN (hypertension)  Patient's blood pressure is at goal.      Current Outpatient Medications on File Prior to Visit   Medication Sig Dispense Refill   • DULoxetine (CYMBALTA) 60 MG Cap DR Particles delayed-release capsule Take 1 Cap by mouth every day. 90 Cap 0   • meloxicam (MOBIC) 15 MG tablet Take 1 Tab by mouth 1 time daily as needed for Moderate Pain. 90 Tab 0   • metoprolol SR (TOPROL XL) 25 MG TABLET SR 24 HR Take 0.5 Tabs by mouth 2 Times a Day. 180 Tab 0   • lisinopril-hydrochlorothiazide (PRINZIDE, ZESTORETIC) 20-12.5 MG per tablet Take 1 Tab by mouth every day. 30 Tab 0   • atorvastatin (LIPITOR) 80 MG tablet Take 40 mg by mouth every day.     • tramadol (ULTRAM) 50 MG Tab Take 50 mg by mouth every four hours as needed.     • HYDROmorphone (DILAUDID) 2 MG Tab Take 1 mg by mouth 2 times a day as needed for Severe Pain.     • aspirin (ASA) 81 " MG Chew Tab chewable tablet Take 1 Tab by mouth every day. 100 Tab 0   • Cholecalciferol (VITAMIN D) 2000 UNIT CAPS Take 2,000 Units by mouth every day.       No current facility-administered medications on file prior to visit.        Past Medical History:   Diagnosis Date   • Acute MI (HCC) 1997    cardiologist, Dr. Hernández   • Allergy    • Arthritis    • Cancer (HCC)     Skin   • High cholesterol    • Hyperlipidemia    • Hypertension    • Muscle disorder    • Personal history of venous thrombosis and embolism 2004    left arm   • Rectal abscess    • Rheumatic fever as child   • Sleep apnea     CPAP   • Stroke (HCC)    • Unspecified cataract     surgical correction bilateral       Allergies: Butrans [buprenorphine]; Codeine; Darvocet [propoxyphene n-apap]; Fentanyl; Nucynta [tapentadol]; Percocet [oxycodone-acetaminophen]; Ambien [zolpidem]; Biaxin [clarithromycin]; Buspar [buspirone]; Celexa; Citalopram; Clindamycin; Clonazepam; Demerol; Diltiazem; Doxepin; Effexor [venlafaxine]; Erythromycin; Haldol [haloperidol]; Hydrocodone; Lexapro; Lunesta; Lyrica; Morphine sulfate [bupropion]; Remeron [mirtazapine]; Serzone [nefazodone]; Tape; Valium; Xanax [alprazolam]; and Zyprexa    Surgical History:  has a past surgical history that includes anal fistulotomy (8/27/08); pr percut implnt neuroelect,epidural (7/15/2015); pr percut implnt neuroelect,epidural (7/15/2015); cataract extraction with iol (Bilateral, 2010); stent placement (2005,2010); knee arthrotomy (Right, 1990); knee arthrotomy (Right, 1992); knee arthroscopy (Left, 1995); nerve ulnar transfer (Right, 2004); elbow arthrotomy (Left, 1998); shoulder arthrotomy (Right, 2006); lumbar laminectomy diskectomy (1988); hip arthroplasty total (Left, 2/2015); spinal cord stimulator (7/29/2015); and eye surgery.    Family History: family history includes Diabetes in his mother; Heart Disease in his mother; Hyperlipidemia in his mother.    Social History:  reports that he  "has never smoked. He has never used smokeless tobacco. He reports current alcohol use. He reports that he does not use drugs.    ROS: no fever or nausea.        Objective:     Vitals:    11/11/19 1110 11/11/19 1146 11/11/19 1148   BP: 114/68 124/72 118/78   BP Location: Left arm Left arm Left arm   Patient Position: Sitting Supine Standing   BP Cuff Size: Adult long Adult long Adult long   Pulse: 77     Resp: 18     Temp: 36.8 °C (98.3 °F)     TempSrc: Temporal     SpO2: 95%     Weight: 104.3 kg (230 lb)     Height: 1.702 m (5' 7\")         Physical Exam:  General: alert in no apparent distress.   Cardio: regular rate and rhythm, no murmurs, rubs or gallops.  Reproducible tenderness in the left intercostal region.  No associated lump or breast mass.  No carotid bruit   Resp: CTAB no w/r/r.   Neuro: Cranial nerves II through XII intact, finger-nose normal, gait is limited by orthopedic concerns but there is no obvious neurologic abnormality.  Strength 5 out of 5 x 4.  Negative Senait-Hallpike bilaterally.    EKG: unchanged from previously.     Assessment and Plan:     1. Need for vaccination  - INFLUENZA VACCINE, HIGH DOSE (65+ ONLY)    2. Coronary artery disease due to lipid rich plaque  - continue current regimen and cardiology follow up.     3. Dyslipidemia  - continue statin.   - Comp Metabolic Panel; Future    4. Elevated blood sugar  - Comp Metabolic Panel; Future  - HEMOGLOBIN A1C; Future    5. Essential hypertension  - continue current regimen.   - Comp Metabolic Panel; Future    6. Fall, initial encounter  Patient describes 3 separate falls in the past 3 to 4 months.  Only one was associated with dizziness.  The patient is not able to qualify for me exactly why he fell.  There are number of things which could increase the risk of falling, including the patient's chronic opiate use, antihypertensive regimen and orthopedic issues.  Does not appear to be an acute cardiac or neurologic issue. Will refer to PT for " the time being. Also recommended he stay hydrated and work with his pain doctor on reducing opiates.     7. Costochondritis  No e/o serious caridiac etiology, EKG unchanged.  - recommended SalonPas patch.         Followup: Return in about 6 months (around 5/11/2020), or if symptoms worsen or fail to improve.

## 2019-11-11 NOTE — ASSESSMENT & PLAN NOTE
"Patient states that for the past 3 or 4 months he has had 3 separate \"falls\".  He states the most recent one was a product of feeling a little bit dizzy but the other ones just \"happened\".  Of note, the patient is maintained on aggressive blood pressure control as well as chronic opiate therapy.  There is no syncope.  He denies associated chest pain or palpitations.  "

## 2019-11-11 NOTE — ASSESSMENT & PLAN NOTE
Patient describes a long-standing history of intermittent, sharp left parasternal pain.  It is not associated with activity.  It can come at any time.  There is no associated nausea, sweating, shortness of breath.  It is reproducible.

## 2019-11-11 NOTE — ASSESSMENT & PLAN NOTE
The patient has coronary artery disease. He follows with cardiology. He is currently maintained on baby aspirin and atorvastatin 80 mg daily.

## 2019-11-18 ENCOUNTER — OFFICE VISIT (OUTPATIENT)
Dept: MEDICAL GROUP | Facility: MEDICAL CENTER | Age: 79
End: 2019-11-18
Payer: MEDICARE

## 2019-11-18 VITALS
HEIGHT: 67 IN | RESPIRATION RATE: 18 BRPM | WEIGHT: 230 LBS | OXYGEN SATURATION: 94 % | DIASTOLIC BLOOD PRESSURE: 60 MMHG | HEART RATE: 69 BPM | TEMPERATURE: 97.2 F | SYSTOLIC BLOOD PRESSURE: 112 MMHG | BODY MASS INDEX: 36.1 KG/M2

## 2019-11-18 DIAGNOSIS — R09.89 RUNNY NOSE: ICD-10-CM

## 2019-11-18 DIAGNOSIS — J02.9 SORE THROAT: ICD-10-CM

## 2019-11-18 DIAGNOSIS — R05.9 COUGH: ICD-10-CM

## 2019-11-18 DIAGNOSIS — R52 BODY ACHES: ICD-10-CM

## 2019-11-18 LAB
FLUAV+FLUBV AG SPEC QL IA: NEGATIVE
INT CON NEG: NEGATIVE
INT CON NEG: NEGATIVE
INT CON POS: POSITIVE
INT CON POS: POSITIVE
S PYO AG THROAT QL: NEGATIVE

## 2019-11-18 PROCEDURE — 99213 OFFICE O/P EST LOW 20 MIN: CPT | Performed by: FAMILY MEDICINE

## 2019-11-18 PROCEDURE — 87880 STREP A ASSAY W/OPTIC: CPT | Performed by: FAMILY MEDICINE

## 2019-11-18 PROCEDURE — 87804 INFLUENZA ASSAY W/OPTIC: CPT | Performed by: FAMILY MEDICINE

## 2019-11-19 NOTE — ASSESSMENT & PLAN NOTE
Patient comes in today with 4 or 5 days of a sore throat with congestion and generalized weakness.  He also describes sweating but no fever.  He also has a cough.  Patient denies chest pain and shortness of breath.  He tried Tylenol without much benefit.  Symptoms are moderate in severity.

## 2019-11-19 NOTE — PROGRESS NOTES
[facility]  Progress Note  Established Patient    Subjective:   Amarjit Khan is a 79 y.o. male here today with a chief complaint of sore throat. The patient is accompanied by his wife.     Sore throat  Patient comes in today with 4 or 5 days of a sore throat with congestion and generalized weakness.  He also describes sweating but no fever.  He also has a cough.  Patient denies chest pain and shortness of breath.  He tried Tylenol without much benefit.  Symptoms are moderate in severity.       Current Outpatient Medications on File Prior to Visit   Medication Sig Dispense Refill   • DULoxetine (CYMBALTA) 60 MG Cap DR Particles delayed-release capsule Take 1 Cap by mouth every day. 90 Cap 0   • meloxicam (MOBIC) 15 MG tablet Take 1 Tab by mouth 1 time daily as needed for Moderate Pain. 90 Tab 0   • metoprolol SR (TOPROL XL) 25 MG TABLET SR 24 HR Take 0.5 Tabs by mouth 2 Times a Day. 180 Tab 0   • lisinopril-hydrochlorothiazide (PRINZIDE, ZESTORETIC) 20-12.5 MG per tablet Take 1 Tab by mouth every day. 30 Tab 0   • atorvastatin (LIPITOR) 80 MG tablet Take 40 mg by mouth every day.     • tramadol (ULTRAM) 50 MG Tab Take 50 mg by mouth every four hours as needed.     • HYDROmorphone (DILAUDID) 2 MG Tab Take 1 mg by mouth 2 times a day as needed for Severe Pain.     • aspirin (ASA) 81 MG Chew Tab chewable tablet Take 1 Tab by mouth every day. 100 Tab 0   • Cholecalciferol (VITAMIN D) 2000 UNIT CAPS Take 2,000 Units by mouth every day.       No current facility-administered medications on file prior to visit.        Past Medical History:   Diagnosis Date   • Acute MI (HCC) 1997    cardiologist, Dr. Hernández   • Allergy    • Arthritis    • Cancer (HCC)     Skin   • High cholesterol    • Hyperlipidemia    • Hypertension    • Muscle disorder    • Personal history of venous thrombosis and embolism 2004    left arm   • Rectal abscess    • Rheumatic fever as child   • Sleep apnea     CPAP   • Stroke (HCC)    •  "Unspecified cataract     surgical correction bilateral       Allergies: Butrans [buprenorphine]; Codeine; Darvocet [propoxyphene n-apap]; Fentanyl; Nucynta [tapentadol]; Percocet [oxycodone-acetaminophen]; Ambien [zolpidem]; Biaxin [clarithromycin]; Buspar [buspirone]; Celexa; Citalopram; Clindamycin; Clonazepam; Demerol; Diltiazem; Doxepin; Effexor [venlafaxine]; Erythromycin; Haldol [haloperidol]; Hydrocodone; Lexapro; Lunesta; Lyrica; Morphine sulfate [bupropion]; Remeron [mirtazapine]; Serzone [nefazodone]; Tape; Valium; Xanax [alprazolam]; and Zyprexa    Surgical History:  has a past surgical history that includes anal fistulotomy (8/27/08); pr percut implnt neuroelect,epidural (7/15/2015); pr percut implnt neuroelect,epidural (7/15/2015); cataract extraction with iol (Bilateral, 2010); stent placement (2005,2010); knee arthrotomy (Right, 1990); knee arthrotomy (Right, 1992); knee arthroscopy (Left, 1995); nerve ulnar transfer (Right, 2004); elbow arthrotomy (Left, 1998); shoulder arthrotomy (Right, 2006); lumbar laminectomy diskectomy (1988); hip arthroplasty total (Left, 2/2015); spinal cord stimulator (7/29/2015); and eye surgery.    Family History: family history includes Diabetes in his mother; Heart Disease in his mother; Hyperlipidemia in his mother.    Social History:  reports that he has never smoked. He has never used smokeless tobacco. He reports current alcohol use. He reports that he does not use drugs.    ROS: see HPI.        Objective:     Vitals:    11/18/19 1650   BP: 112/60   BP Location: Left arm   Patient Position: Sitting   BP Cuff Size: Large adult   Pulse: 69   Resp: 18   Temp: 36.2 °C (97.2 °F)   TempSrc: Temporal   SpO2: 94%   Weight: 104.3 kg (230 lb)   Height: 1.702 m (5' 7\")       Physical Exam:  General: alert in no apparent distress.   Cardio: regular rate and rhythm, no murmurs, rubs or gallops.   Resp: CTAB no w/r/r.   ENMT: Difficult exam due to oral pharyngeal crowding and a " pronounced gag reflex.  From what I can tell, there is no pharyngeal erythema or tonsillar exudates.  Uvula is midline.  No cervical adenopathy.         Assessment and Plan:     1. Sore throat  Patient symptoms are consistent with a viral upper respiratory infection.  His vital signs and physical exam are reassuring.  Point-of-care strep testing flu testing is negative.  I recommended over-the-counter lozenges and Mucinex DM.  I discussed with the patient the risk of bacterial transformation and the need to call with any new or worsening symptoms.  Was also emphasized that he needs to return with any persistent symptoms.        Followup: Return if symptoms worsen or fail to improve.

## 2019-11-22 ENCOUNTER — OFFICE VISIT (OUTPATIENT)
Dept: URGENT CARE | Facility: PHYSICIAN GROUP | Age: 79
End: 2019-11-22
Payer: MEDICARE

## 2019-11-22 ENCOUNTER — TELEPHONE (OUTPATIENT)
Dept: MEDICAL GROUP | Facility: MEDICAL CENTER | Age: 79
End: 2019-11-22

## 2019-11-22 VITALS
SYSTOLIC BLOOD PRESSURE: 150 MMHG | OXYGEN SATURATION: 96 % | RESPIRATION RATE: 16 BRPM | HEIGHT: 68 IN | DIASTOLIC BLOOD PRESSURE: 82 MMHG | BODY MASS INDEX: 34.1 KG/M2 | TEMPERATURE: 98.2 F | WEIGHT: 225 LBS | HEART RATE: 64 BPM

## 2019-11-22 DIAGNOSIS — J02.9 SORE THROAT: ICD-10-CM

## 2019-11-22 DIAGNOSIS — J34.89 NASAL CONGESTION WITH RHINORRHEA: ICD-10-CM

## 2019-11-22 DIAGNOSIS — R09.81 NASAL CONGESTION WITH RHINORRHEA: ICD-10-CM

## 2019-11-22 DIAGNOSIS — J22 ACUTE LOWER RESPIRATORY INFECTION: ICD-10-CM

## 2019-11-22 PROCEDURE — 99214 OFFICE O/P EST MOD 30 MIN: CPT | Performed by: NURSE PRACTITIONER

## 2019-11-22 RX ORDER — LIDOCAINE HYDROCHLORIDE 20 MG/ML
15 SOLUTION OROPHARYNGEAL 4 TIMES DAILY PRN
Qty: 300 ML | Refills: 0 | Status: SHIPPED | OUTPATIENT
Start: 2019-11-22 | End: 2019-11-27

## 2019-11-22 RX ORDER — DOXYCYCLINE HYCLATE 100 MG
100 TABLET ORAL 2 TIMES DAILY
Qty: 14 TAB | Refills: 0 | Status: SHIPPED | OUTPATIENT
Start: 2019-11-22 | End: 2019-11-29

## 2019-11-22 RX ORDER — GUAIFENESIN 600 MG/1
600 TABLET, EXTENDED RELEASE ORAL EVERY 12 HOURS
COMMUNITY
End: 2020-08-31

## 2019-11-22 RX ORDER — ACETAMINOPHEN 325 MG/1
650 TABLET ORAL EVERY 4 HOURS PRN
COMMUNITY
End: 2020-09-25

## 2019-11-22 RX ORDER — FLUTICASONE PROPIONATE 50 MCG
2 SPRAY, SUSPENSION (ML) NASAL DAILY
Qty: 1 BOTTLE | Refills: 0 | Status: SHIPPED | OUTPATIENT
Start: 2019-11-22 | End: 2020-08-31

## 2019-11-22 ASSESSMENT — ENCOUNTER SYMPTOMS
SORE THROAT: 1
CONSTIPATION: 0
SPUTUM PRODUCTION: 1
BACK PAIN: 0
SHORTNESS OF BREATH: 1
WEAKNESS: 0
COUGH: 1
TINGLING: 0
SINUS PAIN: 0
NAUSEA: 0
DIARRHEA: 0
PALPITATIONS: 0
HEADACHES: 0
FEVER: 0
CHILLS: 0
MYALGIAS: 1
DIZZINESS: 0
VOMITING: 0
WHEEZING: 1
SENSORY CHANGE: 0
ORTHOPNEA: 0

## 2019-11-22 NOTE — PROGRESS NOTES
Subjective:      Amarjit Khan is a 79 y.o. male who presents with Cough (runny nose, aches Onset monday last week)            HPI  Sore throat, cough: phlegm, green. Nasal congestion, nasal d/c: unknown color. Tylenol every 4 hrs. Denies fever. Mld wheeze, denies asthma, no SOB, chest tightness or wheeze. Ear pressure. PCP recommended cough drops and Mucinex DM, saw him 4 days ago. Has flonase, not using saline.     PMH:  has a past medical history of Acute MI ([facility]) (1997), Allergy, Arthritis, Cancer ([facility]), High cholesterol, Hyperlipidemia, Hypertension, Muscle disorder, Personal history of venous thrombosis and embolism (2004), Rectal abscess, Rheumatic fever (as child), Sleep apnea, Stroke ([facility]), and Unspecified cataract.  MEDS:   Current Outpatient Medications:   •  acetaminophen (TYLENOL) 325 MG Tab, Take 650 mg by mouth every four hours as needed., Disp: , Rfl:   •  guaiFENesin ER (MUCINEX) 600 MG TABLET SR 12 HR, Take 600 mg by mouth every 12 hours., Disp: , Rfl:   •  DULoxetine (CYMBALTA) 60 MG Cap DR Particles delayed-release capsule, Take 1 Cap by mouth every day., Disp: 90 Cap, Rfl: 0  •  meloxicam (MOBIC) 15 MG tablet, Take 1 Tab by mouth 1 time daily as needed for Moderate Pain., Disp: 90 Tab, Rfl: 0  •  metoprolol SR (TOPROL XL) 25 MG TABLET SR 24 HR, Take 0.5 Tabs by mouth 2 Times a Day., Disp: 180 Tab, Rfl: 0  •  lisinopril-hydrochlorothiazide (PRINZIDE, ZESTORETIC) 20-12.5 MG per tablet, Take 1 Tab by mouth every day., Disp: 30 Tab, Rfl: 0  •  atorvastatin (LIPITOR) 80 MG tablet, Take 40 mg by mouth every day., Disp: , Rfl:   •  tramadol (ULTRAM) 50 MG Tab, Take 50 mg by mouth every four hours as needed., Disp: , Rfl:   •  HYDROmorphone (DILAUDID) 2 MG Tab, Take 1 mg by mouth 2 times a day as needed for Severe Pain., Disp: , Rfl:   •  aspirin (ASA) 81 MG Chew Tab chewable tablet, Take 1 Tab by mouth every day., Disp: 100 Tab, Rfl: 0  •  Cholecalciferol (VITAMIN D) 2000 UNIT CAPS, Take 2,000  Units by mouth every day., Disp: , Rfl:   ALLERGIES:   Allergies   Allergen Reactions   • Butrans [Buprenorphine] Anaphylaxis     CYP 3A4 and 3A5 Mutation   • Codeine Anaphylaxis     CYP 3A4 and 3A5 Mutation   • Darvocet [Propoxyphene N-Apap] Anaphylaxis   • Fentanyl Anaphylaxis     CYP 3A4 and 3A5 Mutation   • Nucynta [Tapentadol] Anaphylaxis and Swelling   • Percocet [Oxycodone-Acetaminophen] Anaphylaxis   • Ambien [Zolpidem]      CYP 3A4 and 3A5 Mutation   • Biaxin [Clarithromycin]      CYP 3A4 and 3A5 Mutation   • Buspar [Buspirone]      CYP 3A4 and 3A5 Mutation   • Celexa      CYP 3A4 and 3A5 Mutation   • Citalopram    • Clindamycin    • Clonazepam      CYP 3A4 and 3A5 Mutation   • Demerol    • Diltiazem      CYP 3A4 and 3A5 Mutation   • Doxepin      CYP 3A4 and 3A5 Mutation   • Effexor [Venlafaxine]      CYP 3A4 and 3A5 Mutation   • Erythromycin      CYP 3A4 and 3A5 Mutation   • Haldol [Haloperidol]      CYP 3A4 and 3A5 Mutation   • Hydrocodone    • Lexapro      CYP 3A4 and 3A5 Mutation   • Lunesta      CYP 3A4 and 3A5 Mutation   • Lyrica Swelling   • Morphine Sulfate [Bupropion] Hives   • Remeron [Mirtazapine]      CYP 3A4 and 3A5 Mutation   • Serzone [Nefazodone]      CYP 3A4 and 3A5 Mutation   • Tape    • Valium      CYP 3A4 and 3A5 Mutation   • Xanax [Alprazolam]      CYP 3A4 and 3A5 Mutation   • Zyprexa      CYP 3A4 and 3A5 Mutation     SURGHX:   Past Surgical History:   Procedure Laterality Date   • SPINAL CORD STIMULATOR  7/29/2015    Procedure: SPINAL CORD STIMULATOR PERC PERM;  Surgeon: Margarito Goode M.D.;  Location: SURGERY [facility];  Service:    • PB PERCUT IMPLNT NEUROELECT,EPIDURAL  7/15/2015    Procedure: IMPLANT NEUROSTIM EPI ARRAY - SPINAL CORD STIM TRIAL [employer];  Surgeon: Margarito Goode M.D.;  Location: SURGERY [facility] ORS;  Service: Pain Management   • PB PERCUT IMPLNT NEUROELECT,EPIDURAL  7/15/2015    Procedure: IMPLANT NEUROSTIM EPI ARRAY;  Surgeon: Margarito Goode  "M.D.;  Location: SURGERY SURGICAL [facility] ORS;  Service: Pain Management   • HIP ARTHROPLASTY TOTAL Left 2/2015   • CATARACT EXTRACTION WITH IOL Bilateral 2010   • ANAL FISTULOTOMY  8/27/08    Performed by ELLY RAMOS at SURGERY SAME DAY [facility] ORS   • SHOULDER ARTHROTOMY Right 2006   • NERVE ULNAR TRANSFER Right 2004   • ELBOW ARTHROTOMY Left 1998   • KNEE ARTHROSCOPY Left 1995   • KNEE ARTHROTOMY Right 1992   • KNEE ARTHROTOMY Right 1990   • LUMBAR LAMINECTOMY DISKECTOMY  1988   • EYE SURGERY     • STENT PLACEMENT  2005,2010     SOCHX:  reports that he has never smoked. He has never used smokeless tobacco. He reports current alcohol use. He reports that he does not use drugs.  FH: Family history was reviewed, no pertinent findings to report    Review of Systems   Constitutional: Positive for malaise/fatigue. Negative for chills and fever.   HENT: Positive for congestion and sore throat. Negative for ear pain and sinus pain.    Respiratory: Positive for cough, sputum production, shortness of breath and wheezing.    Cardiovascular: Negative for chest pain, palpitations and orthopnea.   Gastrointestinal: Negative for constipation, diarrhea, nausea and vomiting.   Musculoskeletal: Positive for myalgias. Negative for back pain.   Skin: Negative for itching and rash.   Neurological: Negative for dizziness, tingling, sensory change, weakness and headaches.   Endo/Heme/Allergies: Negative for environmental allergies.   All other systems reviewed and are negative.         Objective:     /82   Pulse 64   Temp 36.8 °C (98.2 °F)   Resp 16   Ht 1.727 m (5' 8\")   Wt 102.1 kg (225 lb)   SpO2 96%   BMI 34.21 kg/m²      Physical Exam  Vitals signs reviewed.   Constitutional:       General: He is awake. He is not in acute distress.     Appearance: Normal appearance. He is well-developed. He is not ill-appearing, toxic-appearing or diaphoretic.   HENT:      Head: Normocephalic.      Right Ear: Ear canal and " external ear normal. A middle ear effusion is present.      Left Ear: Ear canal and external ear normal. A middle ear effusion is present.      Nose: Mucosal edema, congestion and rhinorrhea present.      Mouth/Throat:      Mouth: Mucous membranes are dry.      Pharynx: Pharyngeal swelling and posterior oropharyngeal erythema present. No oropharyngeal exudate or uvula swelling.      Tonsils: No tonsillar exudate or tonsillar abscesses.   Eyes:      General:         Right eye: No discharge.         Left eye: No discharge.      Conjunctiva/sclera: Conjunctivae normal.      Pupils: Pupils are equal, round, and reactive to light.   Neck:      Musculoskeletal: Normal range of motion.   Cardiovascular:      Rate and Rhythm: Normal rate and regular rhythm.   Pulmonary:      Effort: Pulmonary effort is normal. No accessory muscle usage or respiratory distress.      Breath sounds: Normal breath sounds and air entry.   Abdominal:      General: Bowel sounds are normal. There is no distension.      Palpations: Abdomen is soft.      Tenderness: There is no tenderness. There is no guarding or rebound.   Musculoskeletal: Normal range of motion.   Lymphadenopathy:      Cervical: No cervical adenopathy.   Skin:     General: Skin is warm and dry.   Neurological:      Mental Status: He is alert and oriented to person, place, and time.   Psychiatric:         Behavior: Behavior is cooperative.                 Assessment/Plan:       1. Sore throat    - lidocaine (XYLOCAINE) 2 % Solution; Take 15 mL by mouth 4 times a day as needed for Throat/Mouth Pain for up to 5 days. Gargle and spit out.  Dispense: 300 mL; Refill: 0    2. Nasal congestion with rhinorrhea    - fluticasone (FLONASE) 50 MCG/ACT nasal spray; Spray 2 Sprays in nose every day.  Dispense: 1 Bottle; Refill: 0    3. Acute lower respiratory infection    - doxycycline (VIBRAMYCIN) 100 MG Tab; Take 1 Tab by mouth 2 times a day for 7 days.  Dispense: 14 Tab; Refill: 0    Increase  water intake  May use Ibuprofen/Tylenol prn for fever or body aches  Get rest  Cough drops prn  May use saline nasal spray prn to flush any nasal congestion   May use OTC cough suppressant medications like plain Robitussin/Delsym prn  Monitor for fevers, productive cough, SOB, CP, chest tightness- need re-evaluation

## 2019-11-22 NOTE — TELEPHONE ENCOUNTER
Pt was seen 11/18/2019 DX: Sore throat. Pt advised to take Musinex DM and lozenges. Pt's tried the Musinex but his symptoms have worsened. Sore-throat--hard to swallow, cough, runny nose and no strength. Pt states that he believes that he turcios NOT have a fever though.  , please advise.....

## 2019-12-05 ENCOUNTER — PHYSICAL THERAPY (OUTPATIENT)
Dept: PHYSICAL THERAPY | Facility: MEDICAL CENTER | Age: 79
End: 2019-12-05
Attending: FAMILY MEDICINE
Payer: MEDICARE
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
## 2019-12-05 PROCEDURE — 97110 THERAPEUTIC EXERCISES: CPT

## 2019-12-05 PROCEDURE — 97162 PT EVAL MOD COMPLEX 30 MIN: CPT

## 2019-12-05 NOTE — OP THERAPY EVALUATION
Outpatient Physical Therapy  INITIAL EVALUATION    [facility] Outpatient Physical Therapy  [address]  [place] [address]  Phone:  [phone]  Fax:  [phone]    Date of Evaluation: 2019    Patient: Amarjit Khan  YOB: 1940  MRN: [id number]     Referring Provider: Perry Canchola M.D.  [address]  [address]  [place] [address]   Referring Diagnosis Fall, initial encounter [W19.XXXA]     Time Calculation  Start time: 1400  Stop time: 1500 Time Calculation (min): 60 minutes       Physical Therapy Occurrence Codes    Date of onset of impairment:  19   Date physical therapy care plan established or reviewed:  19   Date physical therapy treatment started:  19          Chief Complaint: Difficulty Walking; Loss Of Balance; and Back Problem    Visit Diagnoses     ICD-10-CM   1. Falls, initial encounter W19.XXXA         Subjective   History of Present Illness:     History of chief complaint:  Amarjit presents today for evaluation of balance and low back and potential affect on balance. Patient and notes describe 2-3 separate falls in the past 3 to 4 months.  Only one was associated with dizziness reports that he got up and felt like he blacked out. The other occasion he was standing and trying to put on his socks and lost balance and fell over. He has a multitude of co morbidities that could contribute to this. Under direction of primary care we will do a throughout balance and physical exam and attempt to help with some problems.            Pain:     Current pain ratin    At best pain ratin    At worst pain ratin    Quality:  Aching, radiating and knife-like    Pain timing:  Constant    Aggravating factors:  Sitting 10-15 mins     Walk 10 or mins    Standing 10    Relieving factors:  Change position frequently    Lives in 10-15 min blocks of activity.     Progression:  Unchanged    Activity Tolerance:     Current activity  tolerance / Recreational activities:  Retired   Not very active    Work:  Retired health and /    Social Support:     Lives in:  One-story house    Lives with:  Spouse (Has son in town )    Treatments:     Treatments to date:  Laminectomy 20 year ago    Had accuputure did not help    He has gone through trials of physical therapy, pain management etc. All with some or min benefit     Patient Goals:     Patient goals for therapy:  Help stop falling, help back pain if able.       Past Medical History:   Diagnosis Date   • Acute MI (HCC) 1997    cardiologist, Dr. Hernández   • Allergy    • Arthritis    • Cancer (HCC)     Skin   • High cholesterol    • Hyperlipidemia    • Hypertension    • Muscle disorder    • Personal history of venous thrombosis and embolism 2004    left arm   • Rectal abscess    • Rheumatic fever as child   • Sleep apnea     CPAP   • Stroke (HCC)    • Unspecified cataract     surgical correction bilateral     Past Surgical History:   Procedure Laterality Date   • SPINAL CORD STIMULATOR  7/29/2015    Procedure: SPINAL CORD STIMULATOR PERC PERM;  Surgeon: Margarito Goode M.D.;  Location: SURGERY [facility];  Service:    • PB PERCUT IMPLNT NEUROELECT,EPIDURAL  7/15/2015    Procedure: IMPLANT NEUROSTIM EPI ARRAY - SPINAL CORD STIM TRIAL [employer];  Surgeon: Margarito Goode M.D.;  Location: SURGERY [facility];  Service: Pain Management   • PB PERCUT IMPLNT NEUROELECT,EPIDURAL  7/15/2015    Procedure: IMPLANT NEUROSTIM EPI ARRAY;  Surgeon: Margarito Goode M.D.;  Location: [facility];  Service: Pain Management   • HIP ARTHROPLASTY TOTAL Left 2/2015   • CATARACT EXTRACTION WITH IOL Bilateral 2010   • ANAL FISTULOTOMY  8/27/08    Performed by ELLY RAMOS at SURGERY SAME DAY [facility]   • SHOULDER ARTHROTOMY Right 2006   • NERVE ULNAR TRANSFER Right 2004   • ELBOW ARTHROTOMY Left 1998   • KNEE ARTHROSCOPY Left 1995   • KNEE ARTHROTOMY Right 1992   • KNEE  ARTHROTOMY Right 1990   • LUMBAR LAMINECTOMY DISKECTOMY  1988   • EYE SURGERY     • STENT PLACEMENT  2005,2010       Precautions:       Objective   Observation and functional movement:  Labored to rise from the chair. Wide based short stride gait, poor trunk ext, poor hip extension. Difficulty with all motor tasks, bed mobility etc.     92% and 78 HR  107/61 bp sitting     Range of motion and strength:    Strength is within functional limits.    Can work through MMT but this is painful    Poor trunk ext relative to hip ext     Sensation and reflexes:     Poor sensation on L worse than R     Hypo reflexive    Palpation and joint mobility:     Tender to low back, erectors, L side glute complex     Joint mobility limited     Balance:     Sitting (static): Normal    Sitting (dynamic): Normal    Standing (static): Fair +    Standing (dynamic): Fair    Saldana balance test done 46/56 (safe for community but peers shoulder average 53 +/- 1.5. He is well below his age group.     30 sec sit to stands (cant stand without use of hands  9 reps with cues to stand tall.     Basic self care and IADL's:     Independent with all self care.    Cognition and visual perception:     No cognition deficits noted.    No visual perception deficits noted.            Therapeutic Exercises (CPT 81979):     1. Develop HEP       Therapeutic Exercise Summary: Access Code: [email or web address]   URL: [email or web address]   Date: 12/05/2019   Prepared by: Doin Templeton     Exercises  · Supine Posterior Pelvic Tilt - 10 reps - 1 sets - 1x daily - 5x weekly  · Supine Lower Trunk Rotation - 10 reps - 1 sets - 1x daily - 5x weekly  · Supine Bridge - 10 reps - 1 sets - 1x daily - 5x weekly  · Supine Hamstring Stretch - 10 reps - 1 sets - 1x daily - 5x weekly  · Supine Hip Hike - 10 reps - 1 sets - 1x daily - 5x weekly  · Standing Quadratus Lumborum Stretch with Doorway - 1 sets - 5-7 reps - 1x daily - 5x weekly  · Standing Lean Away Doorway Stretch - 5-7 reps  "- 1 sets - 1x daily - 5x weekly  · Hip Flexor Stretch on Step - 5-7 reps - 1 sets - 1x daily - 5x weekly        Time-based treatments/modalities:  Therapeutic exercise minutes (CPT 41245): 15 minutes       Assessment, Response and Plan:   Impairments: abnormal gait, abnormal or restricted ROM, activity intolerance, impaired functional mobility, hypersensitivity, lacks appropriate home exercise program, limited mobility, pain with function and weight-bearing intolerance    Assessment details:   Amarjit is a 79 year old with several problems. In relation to his balance it does not appear to be neurological or inner ear based. His balance problems are likely from several orthopedic issues. His back is the biggest problem. He has poor hip and trunk mobility causing him to always bend forward at the waist. He has some radicular symptoms down the L side with create poor sensation at times. He is fearful to move and slow to react via balance reactions due to pain in the back. The problem is he has been through conservative methods (PT, accuputure, medication, Physiatry, including injection treatments, ablations, He has even had low back surgery.) His body is failing him and he does not have the strength or endurance to fight this consistently. Prognosis is poor but he warrants some low back therapy to see if we can reduce his pain a little, teach him henry body awareness and balance tasks to \"up train\" what he can.       Barriers to therapy:  Comorbidities and poorly tolerated treatments  Prognosis: fair    Goals:   Short Term Goals:   1. Establish HEP  2. Patient report 25% decrease in pain symptoms via subjective report/objective pain scale  3. Patient to Establish YOUNG balance scale  4. Patient to show ability to sit to chair without use of hands 3/5 trials   Short term goal time span:  2-4 weeks      Long Term Goals:    1. Update and progress HEP  2. Patient report 50% decrease in pain symptoms via subjective " report/objective pain scale  3. Improve Saldana score 5+ points to within age norm  4. Patient to show ability to sit to chair without use of hands 2/4 with good slow eccentric control.   Long term goal time span:  4-6 weeks    Plan:   Therapy options:  Physical therapy treatment to continue  Planned therapy interventions:  E Stim Unattended (CPT 34427), Manual Therapy (CPT 24163), Mechanical Traction (CPT 81055), Neuromuscular Re-education (CPT 56652) and Therapeutic Exercise (CPT 78552)  Frequency:  2x week  Duration in weeks:  2  Duration in visits:  5  Plan details:  2 x week for 8-10 visits to see what we can do      Functional Assessment Used  Saldana Balance Total Score (0-56): 46     Referring provider co-signature:  I have reviewed this plan of care and my co-signature certifies the need for services.  Certification Dates:   From 12/05/19     To 01/10/20    Physician Signature: ________________________________ Date: ______________

## 2019-12-06 ASSESSMENT — ENCOUNTER SYMPTOMS
PAIN SCALE AT HIGHEST: 9
PAIN SCALE: 8
QUALITY: RADIATING
PAIN TIMING: CONSTANT
QUALITY: ACHING
PAIN SCALE AT LOWEST: 6
QUALITY: KNIFE-LIKE

## 2019-12-06 ASSESSMENT — BALANCE ASSESSMENTS
BALANCE - STANDING STATIC: FAIR +
STANDING UNSUPPORTED: 4
STANDING ON ONE LEG: 3
STANDING UNSUPPORTED WITH FEET TOGETHER: 2
STANDING UNSUPPORTED WITH EYES CLOSED: 3
PLACE ALTERNATE FOOT ON STEP OR STOOL WHILE STANDING UNSUPPORTED: 4
SITTING UNSUPPORTED: 4
BALANCE - SITTING STATIC: NORMAL
REACHING FORWARD WITH OUTSTRETCHED ARM WHILE STANDING: 3
LOOK OVER LEFT AND RIGHT SHOULDERS WHILE STANDING: 2
LONG VERSION TOTAL SCORE (MAX 56): 46
TRANSFERS: 4
BALANCE - STANDING DYNAMIC: FAIR
STANDING UNSUPPORTED ONE FOOT IN FRONT: 4
LONG VERSION TOTAL SCORE (MAX 56): 46
BALANCE - SITTING DYNAMIC: NORMAL
TURN 360 DEGREES: 2
SITTING TO STANDING: 4
STANDING TO SITTING: 4
PICK UP OBJECT FROM THE FLOOR FROM A STANDING POSITION: 3

## 2019-12-11 ENCOUNTER — PHYSICAL THERAPY (OUTPATIENT)
Dept: PHYSICAL THERAPY | Facility: MEDICAL CENTER | Age: 79
End: 2019-12-11
Attending: FAMILY MEDICINE
Payer: MEDICARE
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
## 2019-12-11 PROCEDURE — 97014 ELECTRIC STIMULATION THERAPY: CPT

## 2019-12-11 PROCEDURE — 97140 MANUAL THERAPY 1/> REGIONS: CPT

## 2019-12-11 PROCEDURE — 97110 THERAPEUTIC EXERCISES: CPT

## 2019-12-11 NOTE — OP THERAPY DAILY TREATMENT
Outpatient Physical Therapy  DAILY TREATMENT     [facility] Outpatient Physical Therapy  [address]  [place] [address]  Phone:  [phone]  Fax:  [phone]    Date: 12/11/2019    Patient: Amarjit Khan  YOB: 1940  MRN: [id number]     Time Calculation  Start time: 1030  Stop time: 1115 Time Calculation (min): 45 minutes       Chief Complaint: Back Problem; Fall; Loss Of Balance; and Difficulty Walking    Visit #: 2    SUBJECTIVE:  Amarjit presents for follow up on balance issues likely realted low back orthopedic issuies    He has poor hip and trunk mobility causing him to always bend forward at the waist. He has some radicular symptoms down the L side with create poor sensation at times.    OBJECTIVE:  Current objective measures:     Therapeutic Exercises (CPT 30523):     1. Review HEP     2. Nu Step Level 6, Arms 12, seat 12 x 4 mins     3. Gastroc stretch, Slant board     4. Counter ext and flexion stretch       Therapeutic Exercise Summary: Supine Posterior Pelvic Tilt - 10 reps - 1 sets - 1x daily - 5x weekly  ·           Supine Lower Trunk Rotation - 10 reps - 1 sets - 1x daily - 5x weekly  ·           Supine Bridge - 10 reps - 1 sets - 1x daily - 5x weekly  ·           Supine Hamstring Stretch - 10 reps - 1 sets - 1x daily - 5x weekly  ·           Supine Hip Hike - 10 reps - 1 sets - 1x daily - 5x weekly  ·           Standing Quadratus Lumborum Stretch with Doorway - 1 sets - 5-7 reps - 1x daily - 5x weekly  ·           Standing Lean Away Doorway Stretch - 5-7 reps - 1 sets - 1x daily - 5x weekly  ·           Hip Flexor Stretch on Step - 5-7 reps - 1 sets - 1x daily - 5x weekly    Therapeutic Treatments and Modalities:     1. Manual Therapy (CPT 76941), L hip , L side glute med peel, active flexion ext and iso metric hold hs, TFL and Glute     2. E Stim Unattended (CPT 10823), L lumbar, IFC and heat x 10 mins     Time-based treatments/modalities:  Manual  "therapy minutes (CPT 03934): 15 minutes  Therapeutic exercise minutes (CPT 14224): 15 minutes         ASSESSMENT:   Response to treatment: Focused on getting the L hip \"loosened\" up so he can build some strength and endurance without increasing his low back/SI pain. He does well and reports some easier walking. He needs to maintain this and help this with exercises and stretches at home    PLAN/RECOMMENDATIONS:   Plan for treatment: therapy treatment to continue next visit.  Planned interventions for next visit: continue with current treatment.       "

## 2019-12-18 ENCOUNTER — PHYSICAL THERAPY (OUTPATIENT)
Dept: PHYSICAL THERAPY | Facility: MEDICAL CENTER | Age: 79
End: 2019-12-18
Attending: FAMILY MEDICINE
Payer: MEDICARE
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
## 2019-12-18 PROCEDURE — 97014 ELECTRIC STIMULATION THERAPY: CPT

## 2019-12-18 PROCEDURE — 97110 THERAPEUTIC EXERCISES: CPT

## 2019-12-18 PROCEDURE — 97140 MANUAL THERAPY 1/> REGIONS: CPT

## 2019-12-18 NOTE — OP THERAPY DAILY TREATMENT
Outpatient Physical Therapy  DAILY TREATMENT     [facility] Outpatient Physical Therapy  [address]  [place] [address]  Phone:  [phone]  Fax:  [phone]    Date: 12/18/2019    Patient: Amarjit Khan  YOB: 1940  MRN: [id number]     Time Calculation  Start time: 1000  Stop time: 1045 Time Calculation (min): 45 minutes       Chief Complaint: Back Problem and Hip Problem    Visit #: 3    SUBJECTIVE:  Amarjit presents for follow up on balance issues likely realted low back orthopedic issuies    He has poor hip and trunk mobility causing him to always bend forward at the waist. He has some radicular symptoms down the L side with create poor sensation at times.    OBJECTIVE:  Current objective measures:     Therapeutic Exercises (CPT 07350):     1. Review HEP     2. Upright bike, Level 1 x 5 mins     3. Gastroc stretch, Slant board     4. Counter ext and flexion stretch       Therapeutic Exercise Summary: Supine Posterior Pelvic Tilt - 10 reps - 1 sets - 1x daily - 5x weekly  ·           Supine Lower Trunk Rotation - 10 reps - 1 sets - 1x daily - 5x weekly  ·           Supine Bridge - 10 reps - 1 sets - 1x daily - 5x weekly  ·           Supine Hamstring Stretch - 10 reps - 1 sets - 1x daily - 5x weekly  ·           Supine Hip Hike - 10 reps - 1 sets - 1x daily - 5x weekly  ·           Standing Quadratus Lumborum Stretch with Doorway - 1 sets - 5-7 reps - 1x daily - 5x weekly  ·           Standing Lean Away Doorway Stretch - 5-7 reps - 1 sets - 1x daily - 5x weekly  ·           Hip Flexor Stretch on Step - 5-7 reps - 1 sets - 1x daily - 5x weekly    Therapeutic Treatments and Modalities:     1. Manual Therapy (CPT 23554), L hip , L side glute med peel, active flexion ext and iso metric hold hs, TFL and Glute     2. E Stim Unattended (CPT 12773), L lumbar, IFC and heat x 10 mins     Time-based treatments/modalities:  Manual therapy minutes (CPT 61238): 15  "minutes  Therapeutic exercise minutes (CPT 34288): 15 minutes         ASSESSMENT:   Response to treatment: Focused on getting the L hip \"loosened\" up so he can build some strength and endurance without increasing his low back/SI pain. He does well and reports some easier walking. He needs to maintain this and help this with exercises and stretches at home    PLAN/RECOMMENDATIONS:   Plan for treatment: therapy treatment to continue next visit.  Planned interventions for next visit: continue with current treatment.       "

## 2019-12-20 ENCOUNTER — PHYSICAL THERAPY (OUTPATIENT)
Dept: PHYSICAL THERAPY | Facility: MEDICAL CENTER | Age: 79
End: 2019-12-20
Attending: FAMILY MEDICINE
Payer: MEDICARE
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
## 2019-12-20 PROCEDURE — 97110 THERAPEUTIC EXERCISES: CPT

## 2019-12-20 PROCEDURE — 97014 ELECTRIC STIMULATION THERAPY: CPT

## 2019-12-20 PROCEDURE — 97140 MANUAL THERAPY 1/> REGIONS: CPT

## 2019-12-20 NOTE — OP THERAPY DAILY TREATMENT
Outpatient Physical Therapy  DAILY TREATMENT     [facility] Outpatient Physical Therapy  [address]  [place] [address]  Phone:  [phone]  Fax:  [phone]    Date: 12/20/2019    Patient: Amarjit Khan  YOB: 1940  MRN: [id number]     Time Calculation  Start time: 1030  Stop time: 1115 Time Calculation (min): 45 minutes       Chief Complaint: Back Problem; Difficulty Walking; and Loss Of Balance    Visit #: 4    SUBJECTIVE:  Amarjit presents for follow up on balance issues likely realted low back orthopedic issuies    He reports that he went to a basketball game and felt better walking and with less limping .    OBJECTIVE:  Current objective measures:     Therapeutic Exercises (CPT 80411):     1. Review HEP     2. Upright bike, Level 1 x 5 mins     3. Gastroc stretch, Slant board       Therapeutic Exercise Summary: Supine Posterior Pelvic Tilt - 10 reps - 1 sets - 1x daily - 5x weekly  ·           Supine Lower Trunk Rotation - 10 reps - 1 sets - 1x daily - 5x weekly  ·           Supine Bridge - 10 reps - 1 sets - 1x daily - 5x weekly  ·           Supine Hamstring Stretch - 10 reps - 1 sets - 1x daily - 5x weekly  ·           Supine Hip Hike - 10 reps - 1 sets - 1x daily - 5x weekly  ·           Standing Quadratus Lumborum Stretch with Doorway - 1 sets - 5-7 reps - 1x daily - 5x weekly  ·           Standing Lean Away Doorway Stretch - 5-7 reps - 1 sets - 1x daily - 5x weekly  ·           Hip Flexor Stretch on Step - 5-7 reps - 1 sets - 1x daily - 5x weekly    Therapeutic Treatments and Modalities:     1. Manual Therapy (CPT 99860), L hip , L side glute med peel, active flexion ext and iso metric hold hs, TFL and Glute     2. E Stim Unattended (CPT 57153), L lumbar Supine , IFC and heat x 10 mins     Time-based treatments/modalities:  Manual therapy minutes (CPT 99562): 20 minutes  Therapeutic exercise minutes (CPT 16329): 10 minutes         ASSESSMENT:   Response  to treatment: We are on the right track. Feeling slightly better. Will work in some standing balance and trunk work as tolerated.   PLAN/RECOMMENDATIONS:   Plan for treatment: therapy treatment to continue next visit.  Planned interventions for next visit: continue with current treatment.

## 2019-12-23 ENCOUNTER — PHYSICAL THERAPY (OUTPATIENT)
Dept: PHYSICAL THERAPY | Facility: MEDICAL CENTER | Age: 79
End: 2019-12-23
Attending: FAMILY MEDICINE
Payer: MEDICARE
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
## 2019-12-23 PROCEDURE — 97014 ELECTRIC STIMULATION THERAPY: CPT

## 2019-12-23 PROCEDURE — 97110 THERAPEUTIC EXERCISES: CPT

## 2019-12-23 PROCEDURE — 97140 MANUAL THERAPY 1/> REGIONS: CPT

## 2019-12-23 NOTE — OP THERAPY DAILY TREATMENT
Outpatient Physical Therapy  DAILY TREATMENT     [facility] Outpatient Physical Therapy  [address]  [place] [address]  Phone:  [phone]  Fax:  [phone]    Date: 12/23/2019    Patient: Amarjit Khan  YOB: 1940  MRN: [id number]     Time Calculation  Start time: 1030  Stop time: 1115 Time Calculation (min): 45 minutes       Chief Complaint: Back Problem and Difficulty Walking    Visit #: 5    SUBJECTIVE:  Amarjit presents for follow up on balance issues likely realted low back orthopedic issuies  e reports that he went to a basketball game and felt better walking and with less limping   L leg/Hip flexor sore causing some limping.  OBJECTIVE:  Current objective measures:     Therapeutic Exercises (CPT 55718):     1. Review HEP     2. Upright bike, Level 1 x 5 mins     3. Gastroc stretch, Slant board     4. Banded walk, Orange band given      Therapeutic Exercise Summary: Supine Posterior Pelvic Tilt - 10 reps - 1 sets - 1x daily - 5x weekly  ·           Supine Lower Trunk Rotation - 10 reps - 1 sets - 1x daily - 5x weekly  ·           Supine Bridge - 10 reps - 1 sets - 1x daily - 5x weekly  ·           Supine Hamstring Stretch - 10 reps - 1 sets - 1x daily - 5x weekly  ·           Supine Hip Hike - 10 reps - 1 sets - 1x daily - 5x weekly  ·           Standing Quadratus Lumborum Stretch with Doorway - 1 sets - 5-7 reps - 1x daily - 5x weekly  ·           Standing Lean Away Doorway Stretch - 5-7 reps - 1 sets - 1x daily - 5x weekly  ·           Hip Flexor Stretch on Step - 5-7 reps - 1 sets - 1x daily - 5x weekly    Therapeutic Treatments and Modalities:     1. Manual Therapy (CPT 53589), L hip , L side glute med peel, active flexion ext and iso metric hold hs, TFL and Glute     2. E Stim Unattended (CPT 06602), L lumbar Supine , IFC and heat x 10 mins     Time-based treatments/modalities:  Manual therapy minutes (CPT 39486): 20 minutes  Therapeutic exercise  minutes (CPT 96497): 10 minutes         ASSESSMENT:   Response to treatment: We are on the right track. Feeling slightly better. Will work in some standing balance and trunk work as tolerated.   PLAN/RECOMMENDATIONS:   Plan for treatment: therapy treatment to continue next visit.  Planned interventions for next visit: continue with current treatment.

## 2020-01-02 ENCOUNTER — APPOINTMENT (RX ONLY)
Dept: URBAN - METROPOLITAN AREA CLINIC 4 | Facility: CLINIC | Age: 80
Setting detail: DERMATOLOGY
End: 2020-01-02

## 2020-01-02 DIAGNOSIS — L81.4 OTHER MELANIN HYPERPIGMENTATION: ICD-10-CM

## 2020-01-02 DIAGNOSIS — Z85.828 PERSONAL HISTORY OF OTHER MALIGNANT NEOPLASM OF SKIN: ICD-10-CM

## 2020-01-02 DIAGNOSIS — D22 MELANOCYTIC NEVI: ICD-10-CM

## 2020-01-02 DIAGNOSIS — L57.8 OTHER SKIN CHANGES DUE TO CHRONIC EXPOSURE TO NONIONIZING RADIATION: ICD-10-CM

## 2020-01-02 DIAGNOSIS — D18.0 HEMANGIOMA: ICD-10-CM

## 2020-01-02 DIAGNOSIS — L82.0 INFLAMED SEBORRHEIC KERATOSIS: ICD-10-CM

## 2020-01-02 DIAGNOSIS — L82.1 OTHER SEBORRHEIC KERATOSIS: ICD-10-CM

## 2020-01-02 PROBLEM — D18.01 HEMANGIOMA OF SKIN AND SUBCUTANEOUS TISSUE: Status: ACTIVE | Noted: 2020-01-02

## 2020-01-02 PROBLEM — D22.5 MELANOCYTIC NEVI OF TRUNK: Status: ACTIVE | Noted: 2020-01-02

## 2020-01-02 PROCEDURE — ? COUNSELING

## 2020-01-02 PROCEDURE — 99214 OFFICE O/P EST MOD 30 MIN: CPT | Mod: 25

## 2020-01-02 PROCEDURE — ? LIQUID NITROGEN

## 2020-01-02 PROCEDURE — 17111 DESTRUCTION B9 LESIONS 15/>: CPT

## 2020-01-02 ASSESSMENT — LOCATION ZONE DERM
LOCATION ZONE: HAND
LOCATION ZONE: TRUNK
LOCATION ZONE: NECK
LOCATION ZONE: FACE
LOCATION ZONE: ARM

## 2020-01-02 ASSESSMENT — LOCATION DETAILED DESCRIPTION DERM
LOCATION DETAILED: RIGHT SUPERIOR POSTERIOR NECK
LOCATION DETAILED: RIGHT SUPERIOR LATERAL NECK
LOCATION DETAILED: RIGHT INFERIOR CENTRAL MALAR CHEEK
LOCATION DETAILED: RIGHT SUPERIOR MEDIAL UPPER BACK
LOCATION DETAILED: RIGHT SUPERIOR UPPER BACK
LOCATION DETAILED: LEFT SUPERIOR UPPER BACK
LOCATION DETAILED: RIGHT ULNAR DORSAL HAND
LOCATION DETAILED: LEFT LATERAL UPPER BACK
LOCATION DETAILED: LEFT INFERIOR ANTERIOR NECK
LOCATION DETAILED: LEFT POSTERIOR SHOULDER
LOCATION DETAILED: LEFT SUPERIOR ANTERIOR NECK
LOCATION DETAILED: LEFT CLAVICULAR NECK
LOCATION DETAILED: LEFT MID-UPPER BACK
LOCATION DETAILED: RIGHT CLAVICULAR NECK
LOCATION DETAILED: LEFT RIB CAGE
LOCATION DETAILED: LEFT ULNAR DORSAL HAND
LOCATION DETAILED: RIGHT SUPERIOR LATERAL UPPER BACK
LOCATION DETAILED: LEFT ANTERIOR SHOULDER

## 2020-01-02 ASSESSMENT — LOCATION SIMPLE DESCRIPTION DERM
LOCATION SIMPLE: RIGHT UPPER BACK
LOCATION SIMPLE: RIGHT HAND
LOCATION SIMPLE: RIGHT ANTERIOR NECK
LOCATION SIMPLE: RIGHT CHEEK
LOCATION SIMPLE: LEFT SHOULDER
LOCATION SIMPLE: LEFT HAND
LOCATION SIMPLE: ABDOMEN
LOCATION SIMPLE: NECK
LOCATION SIMPLE: LEFT ANTERIOR NECK
LOCATION SIMPLE: LEFT UPPER BACK

## 2020-01-02 NOTE — PROCEDURE: LIQUID NITROGEN
Aperture Size (Optional): C
Consent: The patient's consent was obtained including but not limited to risks of crusting, scabbing, blistering, scarring, darker or lighter pigmentary change, recurrence, incomplete removal and infection.
Detail Level: Detailed
Medical Necessity Information: It is in your best interest to select a reason for this procedure from the list below. All of these items fulfill various CMS LCD requirements except the new and changing color options.
Add 52 Modifier (Optional): no
Medical Necessity Clause: This procedure was medically necessary because the lesions that were treated were:
Number Of Freeze-Thaw Cycles: 1 freeze-thaw cycle
Duration Of Freeze Thaw-Cycle (Seconds): 3
Post-Care Instructions: I reviewed with the patient in detail post-care instructions. Patient is to wear sunprotection, and avoid picking at any of the treated lesions. Pt may apply Vaseline to crusted or scabbing areas.

## 2020-01-08 ENCOUNTER — PHYSICAL THERAPY (OUTPATIENT)
Dept: PHYSICAL THERAPY | Facility: MEDICAL CENTER | Age: 80
End: 2020-01-08
Attending: FAMILY MEDICINE
Payer: MEDICARE
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
## 2020-01-08 PROCEDURE — 97110 THERAPEUTIC EXERCISES: CPT

## 2020-01-08 PROCEDURE — 97140 MANUAL THERAPY 1/> REGIONS: CPT

## 2020-01-08 NOTE — OP THERAPY DAILY TREATMENT
Outpatient Physical Therapy  DAILY TREATMENT     [facility] Outpatient Physical Therapy  [address]  [place] [address]  Phone:  [phone]  Fax:  [phone]    Date: 01/08/2020    Patient: Amarjit Khan  YOB: 1940  MRN: [id number]     Time Calculation  Start time: 1430  Stop time: 1515 Time Calculation (min): 45 minutes       Chief Complaint: Hip Problem and Difficulty Walking    Visit #: 6    SUBJECTIVE:  Amarjit presents for follow up on balance issues likely realted low back L sided low back and left hip. Across the whole back.   OBJECTIVE:  Current objective measures:     Therapeutic Exercises (CPT 94211):     1. Review HEP     2. Upright bike, Level 1 x 5 mins     3. Gastroc stretch, Slant board       Therapeutic Exercise Summary: Supine Posterior Pelvic Tilt - 10 reps - 1 sets - 1x daily - 5x weekly  ·           Supine Lower Trunk Rotation - 10 reps - 1 sets - 1x daily - 5x weekly  ·           Supine Bridge - 10 reps - 1 sets - 1x daily - 5x weekly  ·           Supine Hamstring Stretch - 10 reps - 1 sets - 1x daily - 5x weekly  ·           Supine Hip Hike - 10 reps - 1 sets - 1x daily - 5x weekly  ·           Standing Quadratus Lumborum Stretch with Doorway - 1 sets - 5-7 reps - 1x daily - 5x weekly  ·           Standing Lean Away Doorway Stretch - 5-7 reps - 1 sets - 1x daily - 5x weekly  ·           Hip Flexor Stretch on Step - 5-7 reps - 1 sets - 1x daily - 5x weekly    Therapeutic Treatments and Modalities:     1. Manual Therapy (CPT 80825), L hip , L side glute med peel, active flexion ext and iso metric hold hs, TFL and Glute     Time-based treatments/modalities:  Manual therapy minutes (CPT 32717): 15 minutes  Therapeutic exercise minutes (CPT 96087): 15 minutes         ASSESSMENT:   Response to treatment: We are on the right track. Feeling slightly better. Will work in some standing balance and trunk work as tolerated.   PLAN/RECOMMENDATIONS:   Plan  for treatment: therapy treatment to continue next visit.  Planned interventions for next visit: continue with current treatment.

## 2020-01-10 ENCOUNTER — PHYSICAL THERAPY (OUTPATIENT)
Dept: PHYSICAL THERAPY | Facility: MEDICAL CENTER | Age: 80
End: 2020-01-10
Attending: FAMILY MEDICINE
Payer: MEDICARE
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
## 2020-01-10 PROCEDURE — 97010 HOT OR COLD PACKS THERAPY: CPT

## 2020-01-10 PROCEDURE — 97140 MANUAL THERAPY 1/> REGIONS: CPT

## 2020-01-10 PROCEDURE — 97110 THERAPEUTIC EXERCISES: CPT

## 2020-01-10 PROCEDURE — 97014 ELECTRIC STIMULATION THERAPY: CPT

## 2020-01-10 NOTE — OP THERAPY DAILY TREATMENT
Outpatient Physical Therapy  DAILY TREATMENT     [facility] Outpatient Physical Therapy  [address]  [place] [address]  Phone:  [phone]  Fax:  [phone]    Date: 01/10/2020    Patient: Amarjit Khan  YOB: 1940  MRN: [id number]     Time Calculation  Start time: 0900  Stop time: 1000 Time Calculation (min): 60 minutes       Chief Complaint: Back Problem and Hip Problem    Visit #: 7    SUBJECTIVE:  Amarjit presents for follow up on balance issues likely realted low back L sided low back and left hip. Across the whole back. He states that he feels better than yesterday, he states that his pain is worse in the nights as the day progresses. He states that he goes to his gym a couple times a week and focuses on upper body exercises since he does not tolerate lower extremity exercises aside from the NuStep for short duration  OBJECTIVE:  Current objective measures:     Therapeutic Exercises (CPT 19973):     1. Review HEP     2. Nu step , Level 5 x 5 mins     3. Gastroc stretch, Slant board     4. Hip flexor on step     5. Sport cord , 5 reps 4 direction     6. Hip hiking, 7 reps 2 sets      Therapeutic Exercise Summary: Supine Posterior Pelvic Tilt - 10 reps - 1 sets - 1x daily - 5x weekly  ·           Supine Lower Trunk Rotation - 10 reps - 1 sets - 1x daily - 5x weekly  ·           Supine Bridge - 10 reps - 1 sets - 1x daily - 5x weekly  ·           Supine Hamstring Stretch - 10 reps - 1 sets - 1x daily - 5x weekly  ·           Supine Hip Hike - 10 reps - 1 sets - 1x daily - 5x weekly  ·           Standing Quadratus Lumborum Stretch with Doorway - 1 sets - 5-7 reps - 1x daily - 5x weekly  ·           Standing Lean Away Doorway Stretch - 5-7 reps - 1 sets - 1x daily - 5x weekly  ·           Hip Flexor Stretch on Step - 5-7 reps - 1 sets - 1x daily - 5x weekly    Therapeutic Treatments and Modalities:     1. Manual Therapy (CPT 35180), L hip , L side glute med peel,  active flexion ext and iso metric hold hs, TFL and Glute     Time-based treatments/modalities:  Manual therapy minutes (CPT 59034): 15 minutes  Therapeutic exercise minutes (CPT 43335): 30 minutes         ASSESSMENT:   Response to treatment: We are on the right track. Feeling slightly better. Will work in some standing balance and trunk work as tolerated. He tolerated the treatment exercises well with increased soreness to the left hip/lower lumbar area at the end of the session. Soreness relieved with short ambulation and supine position with legs raised with estim and heat pack modalities. Continue to progress balance and trunk/hip strengthening exercises in upcoming visit.   PLAN/RECOMMENDATIONS:   Plan for treatment: therapy treatment to continue next visit.  Planned interventions for next visit: continue with current treatment.

## 2020-01-15 ENCOUNTER — PHYSICAL THERAPY (OUTPATIENT)
Dept: PHYSICAL THERAPY | Facility: MEDICAL CENTER | Age: 80
End: 2020-01-15
Attending: FAMILY MEDICINE
Payer: MEDICARE
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
## 2020-01-15 PROCEDURE — 97014 ELECTRIC STIMULATION THERAPY: CPT

## 2020-01-15 PROCEDURE — 97140 MANUAL THERAPY 1/> REGIONS: CPT

## 2020-01-15 PROCEDURE — 97110 THERAPEUTIC EXERCISES: CPT

## 2020-01-15 NOTE — OP THERAPY DAILY TREATMENT
Outpatient Physical Therapy  DAILY TREATMENT     [facility] Outpatient Physical Therapy  [address]  [place] [address]  Phone:  [phone]  Fax:  [phone]    Date: 01/15/2020    Patient: Amarjit Khan  YOB: 1940  MRN: [id number]     Time Calculation  Start time: 0930  Stop time: 1015 Time Calculation (min): 45 minutes       Chief Complaint: Difficulty Walking; Hip Problem; and Back Problem    Visit #: 8    SUBJECTIVE:  Amarjit presents for follow up on balance issues likely realted low back L sided low back and left hip. Across the whole back. He states that he feels better than yesterday, Wants info on heel lift per podiatrist      OBJECTIVE:  Current objective measures:     Therapeutic Exercises (CPT 33496):     1. Review HEP     2. Nu step , Level 5 x 5 mins     3. Gastroc stretch, Slant board     4. Hip flexor on step       Therapeutic Exercise Summary: Supine Posterior Pelvic Tilt - 10 reps - 1 sets - 1x daily - 5x weekly  ·           Supine Lower Trunk Rotation - 10 reps - 1 sets - 1x daily - 5x weekly  ·           Supine Bridge - 10 reps - 1 sets - 1x daily - 5x weekly  ·           Supine Hamstring Stretch - 10 reps - 1 sets - 1x daily - 5x weekly  ·           Supine Hip Hike - 10 reps - 1 sets - 1x daily - 5x weekly  ·           Standing Quadratus Lumborum Stretch with Doorway - 1 sets - 5-7 reps - 1x daily - 5x weekly  ·           Standing Lean Away Doorway Stretch - 5-7 reps - 1 sets - 1x daily - 5x weekly  ·           Hip Flexor Stretch on Step - 5-7 reps - 1 sets - 1x daily - 5x weekly    Therapeutic Treatments and Modalities:     1. Manual Therapy (CPT 58854), L hip , L side glute med peel, active flexion ext and iso metric hold hs, TFL and Glute     2. E Stim Unattended (CPT 64184), Low back, IFC and heat to low back    Time-based treatments/modalities:  Manual therapy minutes (CPT 24788): 15 minutes  Therapeutic exercise minutes (CPT 15918): 15  minutes         ASSESSMENT:   Response to treatment: We are on the right track. Feeling slightly better. Will work in some standing balance and trunk work as tolerated.  Continue to progress balance and trunk/hip strengthening exercises in upcoming visit.   PLAN/RECOMMENDATIONS:   Plan for treatment: therapy treatment to continue next visit.  Planned interventions for next visit: continue with current treatment.

## 2020-01-27 ENCOUNTER — PHYSICAL THERAPY (OUTPATIENT)
Dept: PHYSICAL THERAPY | Facility: MEDICAL CENTER | Age: 80
End: 2020-01-27
Attending: FAMILY MEDICINE
Payer: MEDICARE
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
## 2020-01-27 PROCEDURE — 97140 MANUAL THERAPY 1/> REGIONS: CPT

## 2020-01-27 PROCEDURE — 97110 THERAPEUTIC EXERCISES: CPT

## 2020-01-27 NOTE — OP THERAPY DAILY TREATMENT
Outpatient Physical Therapy  DAILY TREATMENT     [facility] Outpatient Physical Therapy  [address]  [place] [address]  Phone:  [phone]  Fax:  [phone]    Date: 01/27/2020    Patient: Amarjit Khan  YOB: 1940  MRN: [id number]     Time Calculation  Start time: 1330  Stop time: 1400 Time Calculation (min): 30 minutes       Chief Complaint: Back Problem; Knee Problem; Difficulty Walking; and Loss Of Balance    Visit #: 9    SUBJECTIVE:  Amarjit presents for follow up on balance issues likely realted low back L sided low back and left hip. Across the whole back. He states that he feels better than yesterday, Been wearing a heel lift.     OBJECTIVE:  Current objective measures:     Therapeutic Exercises (CPT 92899):     1. Review HEP     2. Nu step , Level 5 x 5 mins , better today some R knee pain    3. Gastroc stretch, Slant board     4. Standing hip pulley extension, Stance on L hip is better , 10#      Therapeutic Exercise Summary: Supine Posterior Pelvic Tilt - 10 reps - 1 sets - 1x daily - 5x weekly  ·           Supine Lower Trunk Rotation - 10 reps - 1 sets - 1x daily - 5x weekly  ·           Supine Bridge - 10 reps - 1 sets - 1x daily - 5x weekly  ·           Supine Hamstring Stretch - 10 reps - 1 sets - 1x daily - 5x weekly  ·           Supine Hip Hike - 10 reps - 1 sets - 1x daily - 5x weekly  ·           Standing Quadratus Lumborum Stretch with Doorway - 1 sets - 5-7 reps - 1x daily - 5x weekly  ·           Standing Lean Away Doorway Stretch - 5-7 reps - 1 sets - 1x daily - 5x weekly  ·           Hip Flexor Stretch on Step - 5-7 reps - 1 sets - 1x daily - 5x weekly    Therapeutic Treatments and Modalities:     1. Manual Therapy (CPT 19131), L hip , Bilateral hip stretching. Groin, lateral hip hip flexor    2. E Stim Unattended (CPT 53022), Low back, IFC and heat to low back    Time-based treatments/modalities:  Manual therapy minutes (CPT 42547): 15  minutes  Therapeutic exercise minutes (CPT 33388): 15 minutes         ASSESSMENT:   Response to treatment: We are on the right track. Feeling slightly better. Will work in some standing balance and trunk work as tolerated.  Let get consistent 2 or x week as we have fallen off the schedule. Continue to progress balance and trunk/hip strengthening exercises in upcoming visit.   PLAN/RECOMMENDATIONS:   Plan for treatment: therapy treatment to continue next visit.  Planned interventions for next visit: continue with current treatment.

## 2020-01-29 ENCOUNTER — PHYSICAL THERAPY (OUTPATIENT)
Dept: PHYSICAL THERAPY | Facility: MEDICAL CENTER | Age: 80
End: 2020-01-29
Attending: FAMILY MEDICINE
Payer: MEDICARE
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
## 2020-01-29 PROCEDURE — 97110 THERAPEUTIC EXERCISES: CPT

## 2020-01-29 NOTE — OP THERAPY DAILY TREATMENT
Outpatient Physical Therapy  DAILY TREATMENT     [facility] Outpatient Physical Therapy  [address]  [place] [address]  Phone:  [phone]  Fax:  [phone]    Date: 01/29/2020    Patient: Amarjit Khan  YOB: 1940  MRN: [id number]     Time Calculation  Start time: 0930  Stop time: 1015 Time Calculation (min): 45 minutes       Chief Complaint: Back Problem and Hip Problem    Visit #: 10    SUBJECTIVE:  Amarjit presents for follow up on balance issues likely realted low back L sided low back and left hip. Across the whole back. Reports feeling soreness in left hip following previous visit.     OBJECTIVE:  Current objective measures:     Therapeutic Exercises (CPT 19046):     1. Review HEP     2. Nu step , Level 5 x 5 mins , better today some R knee pain    3. Gastroc stretch, Slant board     4. Bridges in supine with 10lb ball/abduction against belt.    5. Hip flexor stretching    6. Standing bracing down on yellow ball, abdominal and glute tightening cues      Therapeutic Exercise Summary: Supine Posterior Pelvic Tilt - 10 reps - 1 sets - 1x daily - 5x weekly  ·           Supine Lower Trunk Rotation - 10 reps - 1 sets - 1x daily - 5x weekly  ·           Supine Bridge - 10 reps - 1 sets - 1x daily - 5x weekly  ·           Supine Hamstring Stretch - 10 reps - 1 sets - 1x daily - 5x weekly  ·           Supine Hip Hike - 10 reps - 1 sets - 1x daily - 5x weekly  ·           Standing Quadratus Lumborum Stretch with Doorway - 1 sets - 5-7 reps - 1x daily - 5x weekly  ·           Standing Lean Away Doorway Stretch - 5-7 reps - 1 sets - 1x daily - 5x weekly  ·           Hip Flexor Stretch on Step - 5-7 reps - 1 sets - 1x daily - 5x weekly    Therapeutic Treatments and Modalities:     1. Manual Therapy (CPT 44228), L hip , Bilateral hip stretching. Groin, lateral hip hip flexor    Time-based treatments/modalities:  Therapeutic exercise minutes (CPT 00571): 30 minutes          ASSESSMENT:   Response to treatment: Reports mild soreness form previous visit and reports L sided hip and low back pain with hip flexor stretching and variable bridging activities. Has difficulty with lumbar-pelvic dissociation mobility exercises. Continue standing balance and trunk work as tolerated.  Let get consistent 2 or x week as we have fallen off the schedule. Continue to progress balance and trunk/hip strengthening exercises in upcoming visit. Upright bike next visit, progress and continue pelvic-lumbar dissociation mobility exercises.  PLAN/RECOMMENDATIONS:   Plan for treatment: therapy treatment to continue next visit.  Planned interventions for next visit: continue with current treatment.

## 2020-02-05 ENCOUNTER — PHYSICAL THERAPY (OUTPATIENT)
Dept: PHYSICAL THERAPY | Facility: MEDICAL CENTER | Age: 80
End: 2020-02-05
Attending: FAMILY MEDICINE
Payer: MEDICARE
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
## 2020-02-05 PROCEDURE — 97110 THERAPEUTIC EXERCISES: CPT

## 2020-02-05 PROCEDURE — 97012 MECHANICAL TRACTION THERAPY: CPT

## 2020-02-05 PROCEDURE — 97140 MANUAL THERAPY 1/> REGIONS: CPT | Mod: XU

## 2020-02-05 NOTE — OP THERAPY DAILY TREATMENT
Outpatient Physical Therapy  DAILY TREATMENT     [facility] Outpatient Physical Therapy  [address]  [place] [address]  Phone:  [phone]  Fax:  [phone]    Date: 02/05/2020    Patient: Amarjit Khan  YOB: 1940  MRN: [id number]     Time Calculation  Start time: 1100  Stop time: 1145 Time Calculation (min): 45 minutes       Chief Complaint: Back Problem; Difficulty Walking; and Hip Problem    Visit #: 11    SUBJECTIVE:  Amarjit presents for follow up on balance issues likely realted low back L sided low back and left hip. Last week we worked on L leg and felt some sharp pull in th L groin. He has been having a hard time.     OBJECTIVE:  Current objective measures:     Therapeutic Exercises (CPT 80508):     1. Review HEP     2. Nu step , Level 5 x 5 mins , better today some R knee pain    3. Gastroc stretch, Slant board     4. Standing door way stretch    5. Seated HS slump stretch      Therapeutic Exercise Summary: Supine Posterior Pelvic Tilt - 10 reps - 1 sets - 1x daily - 5x weekly  ·           Supine Lower Trunk Rotation - 10 reps - 1 sets - 1x daily - 5x weekly  ·           Supine Bridge - 10 reps - 1 sets - 1x daily - 5x weekly  ·           Supine Hamstring Stretch - 10 reps - 1 sets - 1x daily - 5x weekly  ·           Supine Hip Hike - 10 reps - 1 sets - 1x daily - 5x weekly  ·           Standing Quadratus Lumborum Stretch with Doorway - 1 sets - 5-7 reps - 1x daily - 5x weekly  ·           Standing Lean Away Doorway Stretch - 5-7 reps - 1 sets - 1x daily - 5x weekly  ·           Hip Flexor Stretch on Step - 5-7 reps - 1 sets - 1x daily - 5x weekly    Therapeutic Treatments and Modalities:     1. Manual Therapy (CPT 24161), L hip , Glute med peel to R hip isometric hs, TFL and hip flexo    2. Mechanical Traction (CPT 11828), Trial LS, 8-10 mins 85-65 pull    Time-based treatments/modalities:  Manual therapy minutes (CPT 93162): 20 minutes  Therapeutic  exercise minutes (CPT 91829): 10 minutes         ASSESSMENT:   Response to treatment: L side SI is hot and irritable and his HS dont allow much motion. We will keep working and trying different manual, exercises and stretching techniques to reduce his symptoms.   PLAN/RECOMMENDATIONS:   Plan for treatment: therapy treatment to continue next visit.  Planned interventions for next visit: continue with current treatment.

## 2020-02-10 ENCOUNTER — PHYSICAL THERAPY (OUTPATIENT)
Dept: PHYSICAL THERAPY | Facility: MEDICAL CENTER | Age: 80
End: 2020-02-10
Attending: FAMILY MEDICINE
Payer: MEDICARE
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
## 2020-02-10 PROCEDURE — 97110 THERAPEUTIC EXERCISES: CPT

## 2020-02-10 PROCEDURE — 97014 ELECTRIC STIMULATION THERAPY: CPT

## 2020-02-10 PROCEDURE — 97140 MANUAL THERAPY 1/> REGIONS: CPT

## 2020-02-10 NOTE — OP THERAPY DAILY TREATMENT
"  Outpatient Physical Therapy  DAILY TREATMENT     [facility] Outpatient Physical Therapy  [address]  [place] [address]  Phone:  [phone]  Fax:  [phone]    Date: 02/10/2020    Patient: Amarjit Khan  YOB: 1940  MRN: [id number]     Time Calculation  Start time: 1430  Stop time: 1515 Time Calculation (min): 45 minutes       Chief Complaint: Back Problem; Difficulty Walking; and Hip Problem    Visit #: 12    SUBJECTIVE:  Amarjit presents for follow up on balance issues likely realted low back L sided low back and left hip. He comes in today very sore on the L side/Hip, He is having trouble walking today.     OBJECTIVE:  Current objective measures:     Therapeutic Exercises (CPT 23923):     1. Seated HS slump stretch    2. Standing ext at wall    3. Standing \"cat camel\" at wall    4. Sidelying clamshells       Therapeutic Exercise Summary: Supine Posterior Pelvic Tilt - 10 reps - 1 sets - 1x daily - 5x weekly  ·           Supine Lower Trunk Rotation - 10 reps - 1 sets - 1x daily - 5x weekly  ·           Supine Bridge - 10 reps - 1 sets - 1x daily - 5x weekly  ·           Supine Hamstring Stretch - 10 reps - 1 sets - 1x daily - 5x weekly  ·           Supine Hip Hike - 10 reps - 1 sets - 1x daily - 5x weekly  ·           Standing Quadratus Lumborum Stretch with Doorway - 1 sets - 5-7 reps - 1x daily - 5x weekly  ·           Standing Lean Away Doorway Stretch - 5-7 reps - 1 sets - 1x daily - 5x weekly  ·           Hip Flexor Stretch on Step - 5-7 reps - 1 sets - 1x daily - 5x weekly    Therapeutic Treatments and Modalities:     1. Manual Therapy (CPT 56539), L hip , Glute med peel to R hip isometric hs, TFL and hip flexo    2. E Stim Unattended (CPT 75847), Low back L side , Heat and IFC x 15.     Time-based treatments/modalities:  Manual therapy minutes (CPT 89651): 20 minutes  Therapeutic exercise minutes (CPT 63688): 10 minutes      ASSESSMENT:   Response to " treatment: He is walking better after treatment today. We do well then as we try to progress balance and strength he usually comes in really sore and limited with motion. He has some frequent radicular symptoms. He can modulate them but this is not lasting. If we cannot break through I will recommend him to return to his primary care.       PLAN/RECOMMENDATIONS:   Plan for treatment: therapy treatment to continue next visit.  Planned interventions for next visit: continue with current treatment.

## 2020-02-13 ENCOUNTER — PHYSICAL THERAPY (OUTPATIENT)
Dept: PHYSICAL THERAPY | Facility: MEDICAL CENTER | Age: 80
End: 2020-02-13
Attending: FAMILY MEDICINE
Payer: MEDICARE
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
## 2020-02-13 PROCEDURE — 97012 MECHANICAL TRACTION THERAPY: CPT

## 2020-02-13 NOTE — OP THERAPY DAILY TREATMENT
"  Outpatient Physical Therapy  DAILY TREATMENT     [facility] Outpatient Physical Therapy  [address]  [place] [address]  Phone:  [phone]  Fax:  [phone]    Date: 02/13/2020    Patient: Amarjit Khna  YOB: 1940  MRN: [id number]     Time Calculation  Start time: 1000  Stop time: 1045 Time Calculation (min): 45 minutes       Chief Complaint: Back Problem and Hip Problem    Visit #: 13    SUBJECTIVE:  Amarjit presents for follow up on balance issues likely realted low back L sided low back and left hip. He comes in today very sore on the L side/Hip, He is having trouble walking today.     OBJECTIVE:  Current objective measures:     Therapeutic Exercises (CPT 30990):     1. Seated HS slump stretch    2. Standing ext at wall    3. Standing \"cat camel\" at wall    4. Nu step , Level 5, seat and arms at 10. , Some       Therapeutic Exercise Summary: Supine Posterior Pelvic Tilt - 10 reps - 1 sets - 1x daily - 5x weekly  ·           Supine Lower Trunk Rotation - 10 reps - 1 sets - 1x daily - 5x weekly  ·           Supine Bridge - 10 reps - 1 sets - 1x daily - 5x weekly  ·           Supine Hamstring Stretch - 10 reps - 1 sets - 1x daily - 5x weekly  ·           Supine Hip Hike - 10 reps - 1 sets - 1x daily - 5x weekly  ·           Standing Quadratus Lumborum Stretch with Doorway - 1 sets - 5-7 reps - 1x daily - 5x weekly  ·           Standing Lean Away Doorway Stretch - 5-7 reps - 1 sets - 1x daily - 5x weekly  ·           Hip Flexor Stretch on Step - 5-7 reps - 1 sets - 1x daily - 5x weekly    Therapeutic Treatments and Modalities:     1. Manual Therapy (CPT 32905), L hip , Glute med peel to R hip isometric hs, TFL and hip flexo    2. Mechanical Traction (CPT 95120), Low back, 70#/50# x 10 mins     Time-based treatments/modalities:  Manual therapy minutes (CPT 96588): 30 minutes      ASSESSMENT:   Response to treatment: He is walking better after treatment today. We do " well then as we try to progress balance and strength he usually comes in really sore and limited with motion. He has some frequent radicular symptoms. He can modulate them but this is not lasting. If we cannot break through I will recommend him to return to his primary care.       PLAN/RECOMMENDATIONS:   Plan for treatment: therapy treatment to continue next visit.  Planned interventions for next visit: continue with current treatment.

## 2020-02-19 ENCOUNTER — PHYSICAL THERAPY (OUTPATIENT)
Dept: PHYSICAL THERAPY | Facility: MEDICAL CENTER | Age: 80
End: 2020-02-19
Attending: FAMILY MEDICINE
Payer: MEDICARE
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
## 2020-02-19 PROCEDURE — 97110 THERAPEUTIC EXERCISES: CPT

## 2020-02-19 PROCEDURE — 97012 MECHANICAL TRACTION THERAPY: CPT

## 2020-02-19 PROCEDURE — 97140 MANUAL THERAPY 1/> REGIONS: CPT

## 2020-02-19 NOTE — OP THERAPY DAILY TREATMENT
Outpatient Physical Therapy  DAILY TREATMENT     [facility] Outpatient Physical Therapy  [address]  [place] [address]  Phone:  [phone]  Fax:  [phone]    Date: 02/19/2020    Patient: Amarjit Khan  YOB: 1940  MRN: [id number]     Time Calculation  Start time: 1530  Stop time: 1615 Time Calculation (min): 45 minutes       Chief Complaint: Hip Problem and Back Problem    Visit #: 14    SUBJECTIVE:  Amarjit presents for follow up on balance issues likely realted low back L sided low back and left hip. He reports that he is doing well today, but 3 days ago he had severe pain.     OBJECTIVE:  Current objective measures:     Therapeutic Exercises (CPT 91504):     1. Seated HS slump stretch    2. Modified golfer's thoracic rotations    3. Cat camel    4. Child's pose      Therapeutic Exercise Summary: Supine Posterior Pelvic Tilt - 10 reps - 1 sets - 1x daily - 5x weekly  ·           Supine Lower Trunk Rotation - 10 reps - 1 sets - 1x daily - 5x weekly  ·           Supine Bridge - 10 reps - 1 sets - 1x daily - 5x weekly  ·           Supine Hamstring Stretch - 10 reps - 1 sets - 1x daily - 5x weekly  ·           Supine Hip Hike - 10 reps - 1 sets - 1x daily - 5x weekly  ·           Standing Quadratus Lumborum Stretch with Doorway - 1 sets - 5-7 reps - 1x daily - 5x weekly  ·           Standing Lean Away Doorway Stretch - 5-7 reps - 1 sets - 1x daily - 5x weekly  ·           Hip Flexor Stretch on Step - 5-7 reps - 1 sets - 1x daily - 5x weekly    Therapeutic Treatments and Modalities:     1. Manual Therapy (CPT 07532), Low back, Prone PAs, unitlateral mobs, deep pressure to QL, hamstring stretch    2. Mechanical Traction (CPT 46915), Low back with heat pack, 75#/55# x 15 mins     Time-based treatments/modalities:  Manual therapy minutes (CPT 57464): 15 minutes  Therapeutic exercise minutes (CPT 77788): 15 minutes      ASSESSMENT:   Response to treatment: Tolerated  treatment session well, he had a difficult time with child's pose position with increased pain, as well as increased pain getting into variable positions. He has increased pain with hip flexor stretch with radicular symptoms, and pain in the QL area in prone position. We do well then as we try to progress balance and strength he usually comes in really sore and limited with motion. He has some frequent radicular symptoms. He can modulate them but this is not lasting. If we cannot break through I will recommend him to return to his primary care.       PLAN/RECOMMENDATIONS:   Plan for treatment: therapy treatment to continue next visit.  Planned interventions for next visit: continue with current treatment.

## 2020-02-25 ENCOUNTER — PHYSICAL THERAPY (OUTPATIENT)
Dept: PHYSICAL THERAPY | Facility: MEDICAL CENTER | Age: 80
End: 2020-02-25
Attending: FAMILY MEDICINE
Payer: MEDICARE
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
## 2020-02-25 PROCEDURE — 97012 MECHANICAL TRACTION THERAPY: CPT

## 2020-02-25 PROCEDURE — 97140 MANUAL THERAPY 1/> REGIONS: CPT | Mod: XU

## 2020-02-25 PROCEDURE — 97110 THERAPEUTIC EXERCISES: CPT

## 2020-02-25 NOTE — OP THERAPY DAILY TREATMENT
Outpatient Physical Therapy  DAILY TREATMENT     [facility] Outpatient Physical Therapy  [address]  [place] [address]  Phone:  [phone]  Fax:  [phone]    Date: 02/25/2020    Patient: Amarjit Khan  YOB: 1940  MRN: [id number]     Time Calculation  Start time: 1000  Stop time: 1045 Time Calculation (min): 45 minutes       Chief Complaint: Back Problem    Visit #: 15    SUBJECTIVE:  Amarjit presents for follow up on balance issues likely realted low back L sided low back and left hip. He reports that he is doing well today, he felt a little sore after last treatment but felt much better 15 minutes afterwards and has been feeling good since with small aches here and there in the back and knee.    OBJECTIVE:  Current objective measures:     Therapeutic Exercises (CPT 29445):     2. Modified golfer's thoracic rotations    3. Supine trunk rotation    4. Supine bridges    5. NuStep , 5 minutes      Therapeutic Exercise Summary: Supine Posterior Pelvic Tilt - 10 reps - 1 sets - 1x daily - 5x weekly  ·           Supine Lower Trunk Rotation - 10 reps - 1 sets - 1x daily - 5x weekly  ·           Supine Bridge - 10 reps - 1 sets - 1x daily - 5x weekly  ·           Supine Hamstring Stretch - 10 reps - 1 sets - 1x daily - 5x weekly  ·           Supine Hip Hike - 10 reps - 1 sets - 1x daily - 5x weekly  ·           Standing Quadratus Lumborum Stretch with Doorway - 1 sets - 5-7 reps - 1x daily - 5x weekly  ·           Standing Lean Away Doorway Stretch - 5-7 reps - 1 sets - 1x daily - 5x weekly  ·           Hip Flexor Stretch on Step - 5-7 reps - 1 sets - 1x daily - 5x weekly    Therapeutic Treatments and Modalities:     1. Manual Therapy (CPT 25978), Low back, Prone PAs, unitlateral mobs, deep pressure to QL    2. Mechanical Traction (CPT 13847), Low back with heat pack, 75#/55# x 15 mins     Time-based treatments/modalities:  Manual therapy minutes (CPT 13866): 20  minutes  Therapeutic exercise minutes (CPT 87876): 10 minutes      ASSESSMENT:   Response to treatment: Tolerated treatment session well today with less soreness than previous visits, last visit he responded well to more passive techniques followed by mechanical traction. This visit we continued with passive manual therapy followed by some core exercises with supine bridges and trunk rotations followed by mechanical traction. Next visit assess how he responded to treatment and plan accordingly with core stability/strengthening work or continue with manual therapy. We do well then as we try to progress balance and strength he usually comes in really sore and limited with motion. He has some frequent radicular symptoms. He can modulate them but this is not lasting. If we cannot break through I will recommend him to return to his primary care.       PLAN/RECOMMENDATIONS:   Plan for treatment: therapy treatment to continue next visit.  Planned interventions for next visit: continue with current treatment.

## 2020-03-09 ENCOUNTER — PHYSICAL THERAPY (OUTPATIENT)
Dept: PHYSICAL THERAPY | Facility: MEDICAL CENTER | Age: 80
End: 2020-03-09
Attending: FAMILY MEDICINE
Payer: MEDICARE
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
## 2020-03-09 PROCEDURE — 97110 THERAPEUTIC EXERCISES: CPT | Mod: KX

## 2020-03-09 PROCEDURE — 97012 MECHANICAL TRACTION THERAPY: CPT | Mod: KX

## 2020-03-09 PROCEDURE — 97140 MANUAL THERAPY 1/> REGIONS: CPT | Mod: KX,XU

## 2020-03-09 PROCEDURE — 97010 HOT OR COLD PACKS THERAPY: CPT | Mod: KX

## 2020-03-09 NOTE — OP THERAPY DAILY TREATMENT
Outpatient Physical Therapy  DAILY TREATMENT     [facility] Outpatient Physical Therapy  [address]  [place] [address]  Phone:  [phone]  Fax:  [phone]    Date: 03/09/2020    Patient: Amarjit Khan  YOB: 1940  MRN: [id number]     Time Calculation  Start time: 1330  Stop time: 1420 Time Calculation (min): 50 minutes       Chief Complaint: Back Problem    Visit #: 16    SUBJECTIVE:  Amarjit presents for follow up on balance issues likely realted low back L sided low back and left hip. He reports that he is very achy today with low back pain, was sitting all week watching all the basketball games in [place]. Reports sitting 13 hours on one day, sitting for 9 hours on another day and finally driving back from [place] within 9 hours. He reports that supine bridges helps.    OBJECTIVE:  Current objective measures:     Therapeutic Exercises (CPT 39320):     4. Supine bridges, 10 reps     5. NuStep , 5 minutes      Therapeutic Exercise Summary: Supine Posterior Pelvic Tilt - 10 reps - 1 sets - 1x daily - 5x weekly  ·           Supine Lower Trunk Rotation - 10 reps - 1 sets - 1x daily - 5x weekly  ·           Supine Bridge - 10 reps - 1 sets - 1x daily - 5x weekly  ·           Supine Hamstring Stretch - 10 reps - 1 sets - 1x daily - 5x weekly  ·           Supine Hip Hike - 10 reps - 1 sets - 1x daily - 5x weekly  ·           Standing Quadratus Lumborum Stretch with Doorway - 1 sets - 5-7 reps - 1x daily - 5x weekly  ·           Standing Lean Away Doorway Stretch - 5-7 reps - 1 sets - 1x daily - 5x weekly  ·           Hip Flexor Stretch on Step - 5-7 reps - 1 sets - 1x daily - 5x weekly    Therapeutic Treatments and Modalities:     1. Manual Therapy (CPT 48315), Low back, Prone PAs, unitlateral mobs, deep pressure to QL    2. Mechanical Traction (CPT 40658), Low back with heat pack, 75#/55# x 15 mins     Time-based treatments/modalities:  Manual therapy minutes  (CPT 42368): 19 minutes  Therapeutic exercise minutes (CPT 01420): 10 minutes      ASSESSMENT:   Response to treatment: Unable to do too much today due to increased soreness in variable positions with recurring cramps in the groin and lateral lower back. Attempted some passive treatment with manual grade 1 mobs and deep pressure to the bilateral QLs which helped with symptom relief. Pt educated that every 30-40 minutes he needs to get up and stretch or go for short walks as opposed to sitting in a chair for multiple hours in a row. Pt educated on stretching his QL in supine and in standing at the door. Spent most of the treatment session resolving muscle cramping following a set pf supine bridges and finished up treatment with lumbar traction and heat pack, which did not help with symptoms as it had in previous sessions. Next visit assess how he is doing and plan accordingly with core stability/strengthening work or continue with manual therapy. If pt is not making any progress determine whether to refer him back to his referring provider.     PLAN/RECOMMENDATIONS:   Plan for treatment: therapy treatment to continue next visit.  Planned interventions for next visit: continue with current treatment.

## 2020-03-12 ENCOUNTER — APPOINTMENT (OUTPATIENT)
Dept: PHYSICAL THERAPY | Facility: MEDICAL CENTER | Age: 80
End: 2020-03-12
Attending: FAMILY MEDICINE
Payer: MEDICARE

## 2020-03-13 ENCOUNTER — PHYSICAL THERAPY (OUTPATIENT)
Dept: PHYSICAL THERAPY | Facility: MEDICAL CENTER | Age: 80
End: 2020-03-13
Attending: FAMILY MEDICINE
Payer: MEDICARE
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
## 2020-03-13 PROCEDURE — 97140 MANUAL THERAPY 1/> REGIONS: CPT

## 2020-03-13 PROCEDURE — 97110 THERAPEUTIC EXERCISES: CPT

## 2020-03-13 PROCEDURE — 97012 MECHANICAL TRACTION THERAPY: CPT

## 2020-03-13 NOTE — OP THERAPY DAILY TREATMENT
Outpatient Physical Therapy  DAILY TREATMENT     [facility] Outpatient Physical Therapy  [address]  [place] [address]  Phone:  [phone]  Fax:  [phone]    Date: 03/13/2020    Patient: Amarjit Khan  YOB: 1940  MRN: [id number]     Time Calculation  Start time: 0900  Stop time: 0945 Time Calculation (min): 45 minutes       Chief Complaint: Back Problem    Visit #: 17    SUBJECTIVE:  Amarjit presents for follow up on balance issues likely realted low back L sided low back and left hip. He reports that he is doing a lot better than last visit, and able to stretch out his cramping. He reports that he is getting better.    OBJECTIVE:  Current objective measures:     Therapeutic Exercises (CPT 40835):     1. Cat camel, 10 reps    2. Standing modified cat camel, Emphasis on hip movement     3. Pelvic tilts, 10 reps    4. Supine bridges, 10 reps     5. NuStep , 5 minutes    6. Supine bridges 10 reps      Therapeutic Exercise Summary: Supine Posterior Pelvic Tilt - 10 reps - 1 sets - 1x daily - 5x weekly  ·           Supine Lower Trunk Rotation - 10 reps - 1 sets - 1x daily - 5x weekly  ·           Supine Bridge - 10 reps - 1 sets - 1x daily - 5x weekly  ·           Supine Hamstring Stretch - 10 reps - 1 sets - 1x daily - 5x weekly  ·           Supine Hip Hike - 10 reps - 1 sets - 1x daily - 5x weekly  ·           Standing Quadratus Lumborum Stretch with Doorway - 1 sets - 5-7 reps - 1x daily - 5x weekly  ·           Standing Lean Away Doorway Stretch - 5-7 reps - 1 sets - 1x daily - 5x weekly  ·           Hip Flexor Stretch on Step - 5-7 reps - 1 sets - 1x daily - 5x weekly    Therapeutic Treatments and Modalities:     1. Manual Therapy (CPT 40468), Low back, Prone PAs, unitlateral mobs, deep pressure to QL    2. Mechanical Traction (CPT 20015), Low back with heat pack, 80#/55# x 15 mins     Time-based treatments/modalities:  Manual therapy minutes (CPT 73730): 15  minutes  Therapeutic exercise minutes (CPT 76570): 15 minutes      ASSESSMENT:   Response to treatment: Pt doing better today with no significant c/o pain/achyness at rest, in supine position he noted some cramping in the groin and QL bilaterally. Pt had pain harder time with active movements today, he tolerated mechanical traction with heatpack and states that he can walk with less pain, however he ambulated with forward flexed position and needs to be reminded to maintain upright position. Next visit assess how he is doing and plan accordingly with core stability/strengthening work or continue with manual therapy. If pt is not making any progress determine whether to refer him back to his referring provider.     PLAN/RECOMMENDATIONS:   Plan for treatment: therapy treatment to continue next visit.  Planned interventions for next visit: continue with current treatment.

## 2020-03-16 ENCOUNTER — PHYSICAL THERAPY (OUTPATIENT)
Dept: PHYSICAL THERAPY | Facility: MEDICAL CENTER | Age: 80
End: 2020-03-16
Attending: FAMILY MEDICINE
Payer: MEDICARE
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
## 2020-03-16 PROCEDURE — 97012 MECHANICAL TRACTION THERAPY: CPT | Mod: KX

## 2020-03-16 PROCEDURE — 97110 THERAPEUTIC EXERCISES: CPT

## 2020-03-16 NOTE — OP THERAPY DAILY TREATMENT
Outpatient Physical Therapy  DAILY TREATMENT     [facility] Outpatient Physical Therapy  [address]  [place] [address]  Phone:  [phone]  Fax:  [phone]    Date: 03/16/2020    Patient: Amarjit Khan  YOB: 1940  MRN: [id number]     Time Calculation  Start time: 1330  Stop time: 1430 Time Calculation (min): 60 minutes       Chief Complaint: Back Problem    Visit #: 18    SUBJECTIVE:  Amarjit presents for follow up on balance issues likely realted low back L sided low back and left hip. He reports that he is doing better since last visit, pain is more tolerable today. Pain is not constant for now, but still has trouble bending down and picking stuff up. Reports that he was at the [employer] last Thursday and his left left gave out and he fell to his knee. Pt reports that he had been to other therapy clinics prior to coming here for this condition and states he has had the most success at this clinic.    OBJECTIVE:  Current objective measures:     Therapeutic Exercises (CPT 80520):     1. Cat camel, 10 reps    2. Standing modified cat camel, Emphasis on hip movement     3. Pelvic tilts, 10 reps    4. Supine bridges, 10 reps     5. NuStep , 5 minutes    6. Supine bridges 10 reps    7. Standing groin stretch     8. Standing marching against wall      Therapeutic Exercise Summary: Access Code: [email or web address]   URL: [email or web address]   Date: 03/16/2020   Prepared by: Dion Templeton     Exercises  Supine Posterior Pelvic Tilt - 10 reps - 1 sets - 2x daily - 5x weekly                   Supine Lower Trunk Rotation - 10 reps - 1 sets - 2x daily - 5x weekly  Cat-Camel - 10 reps - 1 sets - 2x daily - 5x weekly  Standing Quadratus Lumborum Stretch with Doorway - 2 sets - 10-15 hold - 2-3x daily - 5x weekly  Standing Lean Away Doorway Stretch - 5-7 reps - 1 sets - 2-3x daily - 5x weekly  Hip Flexor Stretch on Step - 1 sets - 10-15 hold - 2-3x daily - 5x weekly  Groin  Stretch at Counter - 10 reps - 1 sets - 1x daily - 5x weekly  Standing Mountain Climbers at Wall - 10 reps - 1 sets - 1x daily - 5x weekly    Therapeutic Treatments and Modalities:     2. Mechanical Traction (CPT 37934), Low back with heat pack, 80#/55# x 15 mins     Time-based treatments/modalities:  Therapeutic exercise minutes (CPT 06248): 45 minutes      ASSESSMENT:   Response to treatment: Pt tolerated treatment without significant pain/symptoms, however he reports some left groin pain in standing and notes that his left leg gives out due to sharp groin pain and reports that if he did not have UE support he feels like he would fall to the ground similar to how he fell at the [employer] last week. Tolerated standing marching against wall for proprioception and light hip flexor work with emphasis on maintaining good posture. He tolerated light hip flexor stretch without pain. Pt provided HEP to take home for discharge and practice exercises so that he can have any questions answered next visit. Discussion with pt regarding his progress and how if there is no change in condition or if he is not improving he will have to follow up with his provider for further instruction. Next visit assess how he is doing and follow up on what his provider says regarding continuing physical therapy.    PLAN/RECOMMENDATIONS:   Plan for treatment: therapy treatment to continue next visit.  Planned interventions for next visit: continue with current treatment.

## 2020-03-19 ENCOUNTER — APPOINTMENT (OUTPATIENT)
Dept: PHYSICAL THERAPY | Facility: MEDICAL CENTER | Age: 80
End: 2020-03-19
Attending: FAMILY MEDICINE
Payer: MEDICARE

## 2020-06-02 DIAGNOSIS — G89.29 OTHER CHRONIC PAIN: ICD-10-CM

## 2020-06-02 RX ORDER — MELOXICAM 15 MG/1
15 TABLET ORAL
Qty: 90 TAB | Refills: 0 | Status: SHIPPED | OUTPATIENT
Start: 2020-06-02 | End: 2020-08-06

## 2020-08-06 ENCOUNTER — HOSPITAL ENCOUNTER (EMERGENCY)
Facility: MEDICAL CENTER | Age: 80
End: 2020-08-06
Attending: EMERGENCY MEDICINE
Payer: MEDICARE

## 2020-08-06 ENCOUNTER — APPOINTMENT (OUTPATIENT)
Dept: RADIOLOGY | Facility: MEDICAL CENTER | Age: 80
End: 2020-08-06
Attending: EMERGENCY MEDICINE
Payer: MEDICARE

## 2020-08-06 VITALS
RESPIRATION RATE: 16 BRPM | DIASTOLIC BLOOD PRESSURE: 63 MMHG | HEIGHT: 68 IN | SYSTOLIC BLOOD PRESSURE: 122 MMHG | WEIGHT: 220 LBS | BODY MASS INDEX: 33.34 KG/M2 | OXYGEN SATURATION: 97 % | TEMPERATURE: 98.6 F | HEART RATE: 60 BPM

## 2020-08-06 DIAGNOSIS — T50.905A ADVERSE EFFECT OF DRUG, INITIAL ENCOUNTER: ICD-10-CM

## 2020-08-06 DIAGNOSIS — M79.18 MUSCULOSKELETAL PAIN: ICD-10-CM

## 2020-08-06 DIAGNOSIS — H53.9 VISUAL CHANGES: ICD-10-CM

## 2020-08-06 DIAGNOSIS — R51.9 NONINTRACTABLE HEADACHE, UNSPECIFIED CHRONICITY PATTERN, UNSPECIFIED HEADACHE TYPE: ICD-10-CM

## 2020-08-06 LAB
ALBUMIN SERPL BCP-MCNC: 3.8 G/DL (ref 3.2–4.9)
ALBUMIN/GLOB SERPL: 1.4 G/DL
ALP SERPL-CCNC: 65 U/L (ref 30–99)
ALT SERPL-CCNC: 44 U/L (ref 2–50)
ANION GAP SERPL CALC-SCNC: 17 MMOL/L (ref 7–16)
AST SERPL-CCNC: 33 U/L (ref 12–45)
BASOPHILS # BLD AUTO: 1 % (ref 0–1.8)
BASOPHILS # BLD: 0.1 K/UL (ref 0–0.12)
BILIRUB SERPL-MCNC: 0.5 MG/DL (ref 0.1–1.5)
BUN SERPL-MCNC: 17 MG/DL (ref 8–22)
CALCIUM SERPL-MCNC: 9.1 MG/DL (ref 8.5–10.5)
CHLORIDE SERPL-SCNC: 106 MMOL/L (ref 96–112)
CO2 SERPL-SCNC: 18 MMOL/L (ref 20–33)
CREAT SERPL-MCNC: 1.55 MG/DL (ref 0.5–1.4)
EKG IMPRESSION: NORMAL
EOSINOPHIL # BLD AUTO: 0.44 K/UL (ref 0–0.51)
EOSINOPHIL NFR BLD: 4.3 % (ref 0–6.9)
ERYTHROCYTE [DISTWIDTH] IN BLOOD BY AUTOMATED COUNT: 46.7 FL (ref 35.9–50)
GLOBULIN SER CALC-MCNC: 2.7 G/DL (ref 1.9–3.5)
GLUCOSE SERPL-MCNC: 212 MG/DL (ref 65–99)
HCT VFR BLD AUTO: 42.5 % (ref 42–52)
HGB BLD-MCNC: 13.9 G/DL (ref 14–18)
IMM GRANULOCYTES # BLD AUTO: 0.04 K/UL (ref 0–0.11)
IMM GRANULOCYTES NFR BLD AUTO: 0.4 % (ref 0–0.9)
LIPASE SERPL-CCNC: 34 U/L (ref 11–82)
LYMPHOCYTES # BLD AUTO: 2.38 K/UL (ref 1–4.8)
LYMPHOCYTES NFR BLD: 23 % (ref 22–41)
MCH RBC QN AUTO: 32.1 PG (ref 27–33)
MCHC RBC AUTO-ENTMCNC: 32.7 G/DL (ref 33.7–35.3)
MCV RBC AUTO: 98.2 FL (ref 81.4–97.8)
MONOCYTES # BLD AUTO: 0.78 K/UL (ref 0–0.85)
MONOCYTES NFR BLD AUTO: 7.6 % (ref 0–13.4)
NEUTROPHILS # BLD AUTO: 6.59 K/UL (ref 1.82–7.42)
NEUTROPHILS NFR BLD: 63.7 % (ref 44–72)
NRBC # BLD AUTO: 0 K/UL
NRBC BLD-RTO: 0 /100 WBC
PLATELET # BLD AUTO: 228 K/UL (ref 164–446)
PMV BLD AUTO: 11.1 FL (ref 9–12.9)
POTASSIUM SERPL-SCNC: 3.9 MMOL/L (ref 3.6–5.5)
PROT SERPL-MCNC: 6.5 G/DL (ref 6–8.2)
RBC # BLD AUTO: 4.33 M/UL (ref 4.7–6.1)
SODIUM SERPL-SCNC: 141 MMOL/L (ref 135–145)
TROPONIN T SERPL-MCNC: 10 NG/L (ref 6–19)
WBC # BLD AUTO: 10.3 K/UL (ref 4.8–10.8)

## 2020-08-06 PROCEDURE — 80053 COMPREHEN METABOLIC PANEL: CPT

## 2020-08-06 PROCEDURE — 93005 ELECTROCARDIOGRAM TRACING: CPT

## 2020-08-06 PROCEDURE — 83690 ASSAY OF LIPASE: CPT

## 2020-08-06 PROCEDURE — 700102 HCHG RX REV CODE 250 W/ 637 OVERRIDE(OP): Performed by: EMERGENCY MEDICINE

## 2020-08-06 PROCEDURE — 700117 HCHG RX CONTRAST REV CODE 255: Performed by: EMERGENCY MEDICINE

## 2020-08-06 PROCEDURE — A9270 NON-COVERED ITEM OR SERVICE: HCPCS | Performed by: EMERGENCY MEDICINE

## 2020-08-06 PROCEDURE — 700105 HCHG RX REV CODE 258: Performed by: EMERGENCY MEDICINE

## 2020-08-06 PROCEDURE — 71045 X-RAY EXAM CHEST 1 VIEW: CPT

## 2020-08-06 PROCEDURE — 84484 ASSAY OF TROPONIN QUANT: CPT

## 2020-08-06 PROCEDURE — 70450 CT HEAD/BRAIN W/O DYE: CPT | Mod: MG

## 2020-08-06 PROCEDURE — 85025 COMPLETE CBC W/AUTO DIFF WBC: CPT

## 2020-08-06 PROCEDURE — 99284 EMERGENCY DEPT VISIT MOD MDM: CPT

## 2020-08-06 PROCEDURE — 36415 COLL VENOUS BLD VENIPUNCTURE: CPT

## 2020-08-06 PROCEDURE — 93005 ELECTROCARDIOGRAM TRACING: CPT | Performed by: EMERGENCY MEDICINE

## 2020-08-06 PROCEDURE — 74175 CTA ABDOMEN W/CONTRAST: CPT | Mod: ME

## 2020-08-06 RX ORDER — SODIUM CHLORIDE, SODIUM LACTATE, POTASSIUM CHLORIDE, CALCIUM CHLORIDE 600; 310; 30; 20 MG/100ML; MG/100ML; MG/100ML; MG/100ML
1000 INJECTION, SOLUTION INTRAVENOUS ONCE
Status: COMPLETED | OUTPATIENT
Start: 2020-08-06 | End: 2020-08-06

## 2020-08-06 RX ORDER — ASPIRIN 300 MG/1
300 SUPPOSITORY RECTAL DAILY
Status: DISCONTINUED | OUTPATIENT
Start: 2020-08-06 | End: 2020-08-06 | Stop reason: HOSPADM

## 2020-08-06 RX ORDER — ASPIRIN 81 MG/1
324 TABLET, CHEWABLE ORAL DAILY
Status: DISCONTINUED | OUTPATIENT
Start: 2020-08-06 | End: 2020-08-06 | Stop reason: HOSPADM

## 2020-08-06 RX ORDER — ASPIRIN 325 MG
325 TABLET ORAL DAILY
Status: DISCONTINUED | OUTPATIENT
Start: 2020-08-06 | End: 2020-08-06 | Stop reason: HOSPADM

## 2020-08-06 RX ADMIN — IOHEXOL 100 ML: 350 INJECTION, SOLUTION INTRAVENOUS at 19:07

## 2020-08-06 RX ADMIN — ASPIRIN 324 MG: 81 TABLET, CHEWABLE ORAL at 18:24

## 2020-08-06 RX ADMIN — SODIUM CHLORIDE, POTASSIUM CHLORIDE, SODIUM LACTATE AND CALCIUM CHLORIDE 1000 ML: 600; 310; 30; 20 INJECTION, SOLUTION INTRAVENOUS at 18:31

## 2020-08-06 ASSESSMENT — ENCOUNTER SYMPTOMS
BACK PAIN: 1
HEADACHES: 1
BLURRED VISION: 1

## 2020-08-06 NOTE — ED TRIAGE NOTES
"Amarjit Khan  Chief Complaint   Patient presents with   • Blurred Vision     sudden onset, approx 1 hour ago   • Medication Reaction     took new medication today, concerned for allergic reaction   • Back Pain     sharp upper,  sudden onset approx 10 minutes ago,  \"feels like my heartattack\"     Pt ambulatory to triage with above complaint.  VSS,  EKG done.    Pt with increased upper back pain since arrival to ED.  Pt took new medication today at approx 1415, \"muscle relaxer\" , \"starts with R\".  Pt with multiple drug allergies d/t genetic mutation.   Pt/staff masked and in appropriate PPE during encounter.   Pt returned to lobby, educated on triage process, and to inform staff of any changes or concerns.     "

## 2020-08-07 NOTE — ED NOTES
Pt back from CT. Pt is A&O x4, reports feeling better, reports mild pain to forehead, radiates down to right shoulder and right ribs. Denies chest pain/ discomfort or n/v. ACUNA. Updated on POC, awaiting CT results. Call light in reach.

## 2020-08-07 NOTE — ED NOTES
Eye acuity completed on patient wearing glasses   Left eye:20/25  Right eye:20/40  Bi lateral eyes:20/30

## 2020-08-07 NOTE — ED NOTES
Discharge instructions provided, pt verbalizes understanding. Pt is awake, alert, VSS. Pt taken out of ER via w/c with son.

## 2020-08-31 ENCOUNTER — OFFICE VISIT (OUTPATIENT)
Dept: MEDICAL GROUP | Facility: MEDICAL CENTER | Age: 80
End: 2020-08-31
Payer: MEDICARE

## 2020-08-31 VITALS
HEART RATE: 92 BPM | SYSTOLIC BLOOD PRESSURE: 104 MMHG | RESPIRATION RATE: 20 BRPM | HEIGHT: 68 IN | DIASTOLIC BLOOD PRESSURE: 60 MMHG | WEIGHT: 225.8 LBS | TEMPERATURE: 97.6 F | OXYGEN SATURATION: 95 % | BODY MASS INDEX: 34.22 KG/M2

## 2020-08-31 DIAGNOSIS — F32.A DEPRESSION, UNSPECIFIED DEPRESSION TYPE: ICD-10-CM

## 2020-08-31 DIAGNOSIS — G47.33 OBSTRUCTIVE SLEEP APNEA SYNDROME: ICD-10-CM

## 2020-08-31 DIAGNOSIS — H53.9 VISUAL CHANGES: ICD-10-CM

## 2020-08-31 DIAGNOSIS — R42 DIZZINESS: ICD-10-CM

## 2020-08-31 PROCEDURE — 99214 OFFICE O/P EST MOD 30 MIN: CPT | Performed by: FAMILY MEDICINE

## 2020-08-31 ASSESSMENT — PATIENT HEALTH QUESTIONNAIRE - PHQ9: CLINICAL INTERPRETATION OF PHQ2 SCORE: 0

## 2020-08-31 ASSESSMENT — FIBROSIS 4 INDEX: FIB4 SCORE: 1.7

## 2020-08-31 NOTE — ASSESSMENT & PLAN NOTE
Patient describes several months of intermittent dizziness described as a vertiginous feeling that wax and wane and tend to occur when he lies down or gets up from a seated or lying position.  Sometimes this is associated with a right headache.  He also describes some generalized fatigue and has had difficulty with his CPAP mask lately.  He would like to see a new sleep doctor.  Of note, he was also seen in the emergency department on 8/6 for some acute visual changes associated with a right headache, jaw pain and back pain.  He had an extensive work-up which was, overall, reassuring.  This included a CT scan of his head.

## 2020-09-01 NOTE — PROGRESS NOTES
[facility]  Progress Note  Established Patient    Subjective:   Amarjit Khan is a 80 y.o. male here today with a chief complaint of dizziness.     Dizziness  Patient describes several months of intermittent dizziness described as a vertiginous feeling that wax and wane and tend to occur when he lies down or gets up from a seated or lying position.  Sometimes this is associated with a right headache.  He also describes some generalized fatigue and has had difficulty with his CPAP mask lately.  He would like to see a new sleep doctor.  Of note, he was also seen in the emergency department on 8/6 for some acute visual changes associated with a right headache, jaw pain and back pain.  He had an extensive work-up which was, overall, reassuring.  This included a CT scan of his head.    Depression  Patient describes some depression associated with his many comorbidities.  He saw a psychologist through his pain doctor but is not sure if it helped too much. He is not currently interested in seeing a psychologist but would be amenable to seeing a psychiatrist for medication management.  He is currently on Cymbalta, primarily for pain.    Obstructive sleep apnea syndrome  Patient has obstructive sleep apnea but has had more difficulty with his mask lately.  He does describe some generalized fatigue.    Visual changes  Patient had some acute visual changes on August 6 precipitating an ER visit with a normal work-up there.  These have resolved.  This was in the setting of Zanaflex use producing hypotension.      Current Outpatient Medications on File Prior to Visit   Medication Sig Dispense Refill   • acetaminophen (TYLENOL) 325 MG Tab Take 650 mg by mouth every four hours as needed.     • DULoxetine (CYMBALTA) 60 MG Cap DR Particles delayed-release capsule Take 1 Cap by mouth every day. 90 Cap 0   • metoprolol SR (TOPROL XL) 25 MG TABLET SR 24 HR Take 0.5 Tabs by mouth 2 Times a Day. 180 Tab 0   •  lisinopril-hydrochlorothiazide (PRINZIDE, ZESTORETIC) 20-12.5 MG per tablet Take 1 Tab by mouth every day. 30 Tab 0   • atorvastatin (LIPITOR) 80 MG tablet Take 40 mg by mouth every day.     • tramadol (ULTRAM) 50 MG Tab Take 50 mg by mouth every four hours as needed.     • HYDROmorphone (DILAUDID) 2 MG Tab Take 1 mg by mouth 2 times a day as needed for Severe Pain.     • aspirin (ASA) 81 MG Chew Tab chewable tablet Take 1 Tab by mouth every day. 100 Tab 0   • Cholecalciferol (VITAMIN D) 2000 UNIT CAPS Take 2,000 Units by mouth every day.       No current facility-administered medications on file prior to visit.        Past Medical History:   Diagnosis Date   • Acute MI (HCC) 1997    cardiologist, Dr. Hernández   • Allergy    • Arthritis    • Cancer (HCC)     Skin   • High cholesterol    • Hyperlipidemia    • Hypertension    • Muscle disorder    • Personal history of venous thrombosis and embolism 2004    left arm   • Rectal abscess    • Rheumatic fever as child   • Sleep apnea     CPAP   • Stroke (HCC)    • Unspecified cataract     surgical correction bilateral       Allergies: Butrans [buprenorphine], Codeine, Darvocet [propoxyphene n-apap], Fentanyl, Nucynta [tapentadol], Percocet [oxycodone-acetaminophen], Ambien [zolpidem], Biaxin [clarithromycin], Buspar [buspirone], Celexa, Citalopram, Clindamycin, Clonazepam, Demerol, Diltiazem, Doxepin, Effexor [venlafaxine], Erythromycin, Haldol [haloperidol], Hydrocodone, Lexapro, Lunesta, Lyrica, Morphine sulfate [bupropion], Remeron [mirtazapine], Serzone [nefazodone], Tape, Valium, Xanax [alprazolam], and Zyprexa    Surgical History:  has a past surgical history that includes anal fistulotomy (8/27/08); pr percut implnt neuroelect,epidural (7/15/2015); pr percut implnt neuroelect,epidural (7/15/2015); cataract extraction with iol (Bilateral, 2010); stent placement (2005,2010); knee arthrotomy (Right, 1990); knee arthrotomy (Right, 1992); knee arthroscopy (Left, 1995);  "nerve ulnar transfer (Right, 2004); elbow arthrotomy (Left, 1998); shoulder arthrotomy (Right, 2006); lumbar laminectomy diskectomy (1988); hip arthroplasty total (Left, 2/2015); spinal cord stimulator (7/29/2015); and eye surgery.    Family History: family history includes Diabetes in his mother; Heart Disease in his mother; Hyperlipidemia in his mother.    Social History:  reports that he has never smoked. He has never used smokeless tobacco. He reports current alcohol use. He reports that he does not use drugs.    ROS: no fever or cough.        Objective:     Vitals:    08/31/20 1541   BP: 104/60   BP Location: Right arm   Patient Position: Sitting   BP Cuff Size: Large adult   Pulse: 92   Resp: 20   Temp: 36.4 °C (97.6 °F)   TempSrc: Temporal   SpO2: 95%   Weight: 102.4 kg (225 lb 12.8 oz)   Height: 1.727 m (5' 8\")       Physical Exam:  General: alert in no apparent distress.   Neuro: Cranial nerves II through XII intact, finger-nose normal, gait is made difficult due to his orthopedic complaints but there is no obvious neurologic issue.  Senait-Hallpike is positive on the right with rotational nystagmus and reproduced symptomatology.  No temporal tenderness.  Psych: Tearful.        Assessment and Plan:     1. Dizziness  The patient's history and examination is consistent with a benign positional paroxysmal vertigo on the right side.  Patient also has a number of other complaints and so I think there are actually several things going on here.  The fatigue may be a product of depression or untreated sleep apnea.  He also had a little anemia in the ER so we will repeat this.  The patient does describe a headache on the right temporal region but there is no temporal tenderness.  Nonetheless, I think an ESR would be warranted to rule out giant cell arteritis.  Depending on the clinical course, a basilar artery stenosis may be a consideration but I think he will respond well to the Epley maneuver at home.  I instructed " him on how to do this and provided him with a handout.  I also had him watch a video.  - US-CAROTID DOPPLER BILAT; Future  - CBC WITH DIFFERENTIAL; Future  - Basic Metabolic Panel; Future  - Sed Rate; Future  - TSH WITH REFLEX TO FT4; Future    2. Visual changes  Considering the acute visual changes, I think getting a carotid ultrasound would be warranted to rule out some ischemic changes..  I am also ordering an ESR.   - US-CAROTID DOPPLER BILAT; Future  - CBC WITH DIFFERENTIAL; Future  - Basic Metabolic Panel; Future  - Sed Rate; Future  - TSH WITH REFLEX TO FT4; Future    3. Obstructive sleep apnea syndrome  - REFERRAL TO PULMONARY AND SLEEP MEDICINE Sleep Medicine    4. Depression, unspecified depression type  - REFERRAL TO PSYCHIATRY    Note: I asked the patient to let me know immediately if he takes a turn for the worse.    Followup: Return if symptoms worsen or fail to improve.

## 2020-09-01 NOTE — ASSESSMENT & PLAN NOTE
Patient describes some depression associated with his many comorbidities.  He saw a psychologist through his pain doctor but is not sure if it helped too much. He is not currently interested in seeing a psychologist but would be amenable to seeing a psychiatrist for medication management.  He is currently on Cymbalta, primarily for pain.

## 2020-09-01 NOTE — ASSESSMENT & PLAN NOTE
Patient had some acute visual changes on August 6 precipitating an ER visit with a normal work-up there.  These have resolved.  This was in the setting of Zanaflex use producing hypotension.

## 2020-09-01 NOTE — ASSESSMENT & PLAN NOTE
Patient has obstructive sleep apnea but has had more difficulty with his mask lately.  He does describe some generalized fatigue.

## 2020-09-02 LAB
BASOPHILS # BLD AUTO: 0.1 X10E3/UL (ref 0–0.2)
BASOPHILS NFR BLD AUTO: 1 %
BUN SERPL-MCNC: 20 MG/DL (ref 8–27)
BUN/CREAT SERPL: 18 (ref 10–24)
CALCIUM SERPL-MCNC: 9.7 MG/DL (ref 8.6–10.2)
CHLORIDE SERPL-SCNC: 106 MMOL/L (ref 96–106)
CO2 SERPL-SCNC: 21 MMOL/L (ref 20–29)
CREAT SERPL-MCNC: 1.09 MG/DL (ref 0.76–1.27)
EOSINOPHIL # BLD AUTO: 0.5 X10E3/UL (ref 0–0.4)
EOSINOPHIL NFR BLD AUTO: 5 %
ERYTHROCYTE [DISTWIDTH] IN BLOOD BY AUTOMATED COUNT: 13 % (ref 11.6–15.4)
ERYTHROCYTE [SEDIMENTATION RATE] IN BLOOD BY WESTERGREN METHOD: 45 MM/HR (ref 0–30)
GLUCOSE SERPL-MCNC: 185 MG/DL (ref 65–99)
HCT VFR BLD AUTO: 45.9 % (ref 37.5–51)
HGB BLD-MCNC: 15.9 G/DL (ref 13–17.7)
IMM GRANULOCYTES # BLD AUTO: 0.1 X10E3/UL (ref 0–0.1)
IMM GRANULOCYTES NFR BLD AUTO: 1 %
IMMATURE CELLS  115398: ABNORMAL
LYMPHOCYTES # BLD AUTO: 2.9 X10E3/UL (ref 0.7–3.1)
LYMPHOCYTES NFR BLD AUTO: 26 %
MCH RBC QN AUTO: 32.2 PG (ref 26.6–33)
MCHC RBC AUTO-ENTMCNC: 34.6 G/DL (ref 31.5–35.7)
MCV RBC AUTO: 93 FL (ref 79–97)
MONOCYTES # BLD AUTO: 0.7 X10E3/UL (ref 0.1–0.9)
MONOCYTES NFR BLD AUTO: 7 %
MORPHOLOGY BLD-IMP: ABNORMAL
NEUTROPHILS # BLD AUTO: 7 X10E3/UL (ref 1.4–7)
NEUTROPHILS NFR BLD AUTO: 60 %
NRBC BLD AUTO-RTO: ABNORMAL %
PLATELET # BLD AUTO: 247 X10E3/UL (ref 150–450)
POTASSIUM SERPL-SCNC: 4.6 MMOL/L (ref 3.5–5.2)
RBC # BLD AUTO: 4.94 X10E6/UL (ref 4.14–5.8)
SODIUM SERPL-SCNC: 142 MMOL/L (ref 134–144)
WBC # BLD AUTO: 11.3 X10E3/UL (ref 3.4–10.8)

## 2020-09-04 ENCOUNTER — HOSPITAL ENCOUNTER (OUTPATIENT)
Dept: RADIOLOGY | Facility: MEDICAL CENTER | Age: 80
End: 2020-09-04
Attending: FAMILY MEDICINE
Payer: MEDICARE

## 2020-09-04 ENCOUNTER — TELEPHONE (OUTPATIENT)
Dept: MEDICAL GROUP | Facility: MEDICAL CENTER | Age: 80
End: 2020-09-04

## 2020-09-04 DIAGNOSIS — M31.6 TEMPORAL ARTERITIS (HCC): ICD-10-CM

## 2020-09-04 DIAGNOSIS — R42 DIZZINESS: ICD-10-CM

## 2020-09-04 DIAGNOSIS — I25.83 CORONARY ARTERY DISEASE DUE TO LIPID RICH PLAQUE: ICD-10-CM

## 2020-09-04 DIAGNOSIS — I25.10 CORONARY ARTERY DISEASE DUE TO LIPID RICH PLAQUE: ICD-10-CM

## 2020-09-04 DIAGNOSIS — H53.9 VISUAL CHANGES: ICD-10-CM

## 2020-09-04 PROCEDURE — 93880 EXTRACRANIAL BILAT STUDY: CPT

## 2020-09-04 PROCEDURE — 93880 EXTRACRANIAL BILAT STUDY: CPT | Mod: 26 | Performed by: INTERNAL MEDICINE

## 2020-09-04 RX ORDER — PREDNISONE 20 MG/1
60 TABLET ORAL DAILY
Qty: 90 TAB | Refills: 0 | Status: SHIPPED | OUTPATIENT
Start: 2020-09-04 | End: 2020-10-06

## 2020-09-04 RX ORDER — OMEPRAZOLE 20 MG/1
20 CAPSULE, DELAYED RELEASE ORAL DAILY
Qty: 30 CAP | Refills: 0 | Status: SHIPPED | OUTPATIENT
Start: 2020-09-04 | End: 2020-09-04

## 2020-09-04 NOTE — TELEPHONE ENCOUNTER
At the last visit I ordered a carotid ultrasound. The patient scheduled this test in a few days. I would like this done today or tomorrow. I reordered this study STAT. Please ask patient to schedule today or tomorrow.

## 2020-09-04 NOTE — TELEPHONE ENCOUNTER
Spoke with patient by phone. Overall, he's feeling a bit better. The dizziness is 40% better but he still has intermittent R sided headaches, though these have improved in severity. He denies visual symptoms or jaw claudication symptoms. I informed him I'd be speaking with rheumatology about his case. Called [facility] and asked for call back from one of the rheumatologists there for possible temporal arteritis. Awaiting call back.

## 2020-09-04 NOTE — TELEPHONE ENCOUNTER
Spoke with pt and radiology.  Future Appointments       Provider Department Center    9/4/2020 4:00 PM [facility] EXAM 6 NONINV [facility] - NON-INVASIVE LAB - [facility]        Dr. Canchola, please note...

## 2020-09-04 NOTE — TELEPHONE ENCOUNTER
Spoke with Dr. Browne ([facility]) who has kindly agreed to try to facilitate an appt with his office. He recommends urgent vascular surg consult in the interim to consider temporal US and, if warranted, biopsy. Also reviewed preliminary carotid US results which are reassuring. Reviewed all this with the patient. He will take pred, continue Epley and await rheum and vasc consults.

## 2020-09-04 NOTE — TELEPHONE ENCOUNTER
Haven't received call back yet but as I await this, I think the benefits of starting prednisone outweigh the risks. Discussed with patient. He will continue aspirin, start prednisone 60 mg daily and omeprazole 20 mg daily for gastric protection and I have placed an urgent consult for rheum.

## 2020-09-09 ENCOUNTER — TELEPHONE (OUTPATIENT)
Dept: MEDICAL GROUP | Facility: MEDICAL CENTER | Age: 80
End: 2020-09-09

## 2020-09-09 NOTE — TELEPHONE ENCOUNTER
Spoke with pt and stated that:  1) Headaches: sharper and harder but less frequent  2) Fatigue: persists  3) Dizziness: had a dizzy spell last night but improving  Pt. Has not being contacted by the specialists' office. I gave pt the numbers to Rheumatology and Vas surgery and encouraged pt to give them a call.  Dr. Canchola, please note....

## 2020-09-09 NOTE — TELEPHONE ENCOUNTER
I saw the patient last week for dizziness, fatigue and a headache. I prescribed steroids. Please call him to see how he's doing.     Please ensure he has an appt with rheumatology and vascular surgery.

## 2020-09-15 NOTE — TELEPHONE ENCOUNTER
Please call the patient and see if he's seen rheumatology. If so, please request the note. Please also request the last 3 notes from [facility].

## 2020-09-15 NOTE — TELEPHONE ENCOUNTER
Called [facility] and pt with an stas coming up on 9/17/20.  Called [facility] and they stated that pt was seen 9/10/20 and had an US done. They'll fax the consult report.  I'll scan into media....  Dr. Canchola, please note...

## 2020-09-17 ENCOUNTER — HOSPITAL ENCOUNTER (OUTPATIENT)
Dept: HOSPITAL 8 - CFH | Age: 80
Discharge: HOME | End: 2020-09-17
Attending: INTERNAL MEDICINE
Payer: MEDICARE

## 2020-09-17 DIAGNOSIS — Z02.9: Primary | ICD-10-CM

## 2020-09-18 ENCOUNTER — TELEPHONE (OUTPATIENT)
Dept: MEDICAL GROUP | Facility: MEDICAL CENTER | Age: 80
End: 2020-09-18

## 2020-09-18 DIAGNOSIS — R42 DIZZINESS: ICD-10-CM

## 2020-09-18 DIAGNOSIS — H53.9 VISUAL CHANGES: ICD-10-CM

## 2020-09-18 NOTE — TELEPHONE ENCOUNTER
Patient didn't read Mompery message. Please relay the message below to this patient.     I just got off the phone with Dr. Browne, your rheumatologist. He mentioned that you were still having dizziness and headache. He also mentioned that you've also been having some visual changes. I wanted to confirm this. If so, I think it would be valuable to have you see a neurologist and ophthalmologist. I can arrange for separate consultations but I was wondering if Dr. Diana would be able to see you. That way, we could kill two birds with one stone. What are your thoughts?    Please inform the patient that I've already placed the referral to Dr. Diana if he'd like to see him.

## 2020-09-18 NOTE — TELEPHONE ENCOUNTER
Spoke with pt and informed of Dr. Canchola's mess.  Pt. Stated that yes, he's been having vision problems as well.  Pt agreed to see Dr. Diana since his wife sees him.  I asked pt to keep an eye via my chart for the referral and if does not hears from them to let me know.  Dr. Canchola, just FROYLAN...

## 2020-09-22 ENCOUNTER — HOSPITAL ENCOUNTER (OUTPATIENT)
Dept: LAB | Facility: MEDICAL CENTER | Age: 80
End: 2020-09-22
Attending: PSYCHIATRY & NEUROLOGY
Payer: MEDICARE

## 2020-09-22 LAB
ALBUMIN SERPL BCP-MCNC: 4.2 G/DL (ref 3.2–4.9)
ALBUMIN/GLOB SERPL: 1.7 G/DL
ALP SERPL-CCNC: 65 U/L (ref 30–99)
ALT SERPL-CCNC: 56 U/L (ref 2–50)
ANION GAP SERPL CALC-SCNC: 14 MMOL/L (ref 7–16)
AST SERPL-CCNC: 21 U/L (ref 12–45)
BASOPHILS # BLD AUTO: 0.3 % (ref 0–1.8)
BASOPHILS # BLD: 0.06 K/UL (ref 0–0.12)
BILIRUB SERPL-MCNC: 1.4 MG/DL (ref 0.1–1.5)
BUN SERPL-MCNC: 32 MG/DL (ref 8–22)
CALCIUM SERPL-MCNC: 9 MG/DL (ref 8.5–10.5)
CHLORIDE SERPL-SCNC: 99 MMOL/L (ref 96–112)
CO2 SERPL-SCNC: 22 MMOL/L (ref 20–33)
CREAT SERPL-MCNC: 0.96 MG/DL (ref 0.5–1.4)
CRP SERPL HS-MCNC: 0.06 MG/DL (ref 0–0.75)
EOSINOPHIL # BLD AUTO: 0.06 K/UL (ref 0–0.51)
EOSINOPHIL NFR BLD: 0.3 % (ref 0–6.9)
ERYTHROCYTE [DISTWIDTH] IN BLOOD BY AUTOMATED COUNT: 48 FL (ref 35.9–50)
ERYTHROCYTE [SEDIMENTATION RATE] IN BLOOD BY WESTERGREN METHOD: <1 MM/HOUR (ref 0–20)
GLOBULIN SER CALC-MCNC: 2.5 G/DL (ref 1.9–3.5)
GLUCOSE SERPL-MCNC: 218 MG/DL (ref 65–99)
HCT VFR BLD AUTO: 48.9 % (ref 42–52)
HGB BLD-MCNC: 16.7 G/DL (ref 14–18)
IMM GRANULOCYTES # BLD AUTO: 0.26 K/UL (ref 0–0.11)
IMM GRANULOCYTES NFR BLD AUTO: 1.4 % (ref 0–0.9)
LYMPHOCYTES # BLD AUTO: 1.02 K/UL (ref 1–4.8)
LYMPHOCYTES NFR BLD: 5.7 % (ref 22–41)
MCH RBC QN AUTO: 32.3 PG (ref 27–33)
MCHC RBC AUTO-ENTMCNC: 34.2 G/DL (ref 33.7–35.3)
MCV RBC AUTO: 94.6 FL (ref 81.4–97.8)
MONOCYTES # BLD AUTO: 0.53 K/UL (ref 0–0.85)
MONOCYTES NFR BLD AUTO: 2.9 % (ref 0–13.4)
NEUTROPHILS # BLD AUTO: 16.08 K/UL (ref 1.82–7.42)
NEUTROPHILS NFR BLD: 89.4 % (ref 44–72)
NRBC # BLD AUTO: 0 K/UL
NRBC BLD-RTO: 0 /100 WBC
PLATELET # BLD AUTO: 175 K/UL (ref 164–446)
PMV BLD AUTO: 12 FL (ref 9–12.9)
POTASSIUM SERPL-SCNC: 4.6 MMOL/L (ref 3.6–5.5)
PROT SERPL-MCNC: 6.7 G/DL (ref 6–8.2)
RBC # BLD AUTO: 5.17 M/UL (ref 4.7–6.1)
SODIUM SERPL-SCNC: 135 MMOL/L (ref 135–145)
WBC # BLD AUTO: 18 K/UL (ref 4.8–10.8)

## 2020-09-22 PROCEDURE — 86147 CARDIOLIPIN ANTIBODY EA IG: CPT | Mod: 91

## 2020-09-22 PROCEDURE — 85652 RBC SED RATE AUTOMATED: CPT

## 2020-09-22 PROCEDURE — 82164 ANGIOTENSIN I ENZYME TEST: CPT

## 2020-09-22 PROCEDURE — 86140 C-REACTIVE PROTEIN: CPT

## 2020-09-22 PROCEDURE — 36415 COLL VENOUS BLD VENIPUNCTURE: CPT

## 2020-09-22 PROCEDURE — 85025 COMPLETE CBC W/AUTO DIFF WBC: CPT

## 2020-09-22 PROCEDURE — 86038 ANTINUCLEAR ANTIBODIES: CPT

## 2020-09-22 PROCEDURE — 80053 COMPREHEN METABOLIC PANEL: CPT

## 2020-09-23 NOTE — TELEPHONE ENCOUNTER
Contacted by Dr. Browne ([facility]) who has spoken with Dr. Diana who has kindly agreed to see the patient. Dr. Diana recommends MRI brain in the interim, this was ordered by Dr. Browne.

## 2020-09-24 NOTE — TELEPHONE ENCOUNTER
Spoke with pt and informed. Pt wanted me to let you know that he'll get a biopsy of bilat temples tomorrow.   Dr. Canchola, just FROYLAN...

## 2020-09-25 LAB
ACE SERPL-CCNC: <5 U/L (ref 9–67)
CARDIOLIPIN IGA SER IA-ACNC: 2 APL (ref 0–11)
CARDIOLIPIN IGG SER IA-ACNC: 4 GPL (ref 0–14)
CARDIOLIPIN IGM SER IA-ACNC: 28 MPL (ref 0–12)
NUCLEAR IGG SER QL IA: NORMAL

## 2020-09-26 ENCOUNTER — ANESTHESIA EVENT (OUTPATIENT)
Dept: SURGERY | Facility: MEDICAL CENTER | Age: 80
End: 2020-09-26
Payer: MEDICARE

## 2020-09-26 ENCOUNTER — HOSPITAL ENCOUNTER (OUTPATIENT)
Facility: MEDICAL CENTER | Age: 80
End: 2020-09-26
Attending: SURGERY | Admitting: SURGERY
Payer: MEDICARE

## 2020-09-26 ENCOUNTER — ANESTHESIA (OUTPATIENT)
Dept: SURGERY | Facility: MEDICAL CENTER | Age: 80
End: 2020-09-26
Payer: MEDICARE

## 2020-09-26 VITALS
DIASTOLIC BLOOD PRESSURE: 72 MMHG | OXYGEN SATURATION: 94 % | TEMPERATURE: 97.4 F | HEIGHT: 68 IN | SYSTOLIC BLOOD PRESSURE: 127 MMHG | HEART RATE: 54 BPM | BODY MASS INDEX: 34.18 KG/M2 | WEIGHT: 225.53 LBS | RESPIRATION RATE: 16 BRPM

## 2020-09-26 LAB
COVID ORDER STATUS COVID19: NORMAL
SARS-COV-2 RDRP RESP QL NAA+PROBE: NOTDETECTED
SPECIMEN SOURCE: NORMAL

## 2020-09-26 PROCEDURE — A9270 NON-COVERED ITEM OR SERVICE: HCPCS | Performed by: ANESTHESIOLOGY

## 2020-09-26 PROCEDURE — A9270 NON-COVERED ITEM OR SERVICE: HCPCS | Performed by: SURGERY

## 2020-09-26 PROCEDURE — 160039 HCHG SURGERY MINUTES - EA ADDL 1 MIN LEVEL 3: Performed by: SURGERY

## 2020-09-26 PROCEDURE — 160002 HCHG RECOVERY MINUTES (STAT): Performed by: SURGERY

## 2020-09-26 PROCEDURE — 501838 HCHG SUTURE GENERAL: Performed by: SURGERY

## 2020-09-26 PROCEDURE — 700101 HCHG RX REV CODE 250: Performed by: SURGERY

## 2020-09-26 PROCEDURE — 88305 TISSUE EXAM BY PATHOLOGIST: CPT

## 2020-09-26 PROCEDURE — 700111 HCHG RX REV CODE 636 W/ 250 OVERRIDE (IP): Performed by: ANESTHESIOLOGY

## 2020-09-26 PROCEDURE — 700105 HCHG RX REV CODE 258: Performed by: SURGERY

## 2020-09-26 PROCEDURE — 160009 HCHG ANES TIME/MIN: Performed by: SURGERY

## 2020-09-26 PROCEDURE — 160035 HCHG PACU - 1ST 60 MINS PHASE I: Performed by: SURGERY

## 2020-09-26 PROCEDURE — 160048 HCHG OR STATISTICAL LEVEL 1-5: Performed by: SURGERY

## 2020-09-26 PROCEDURE — 160046 HCHG PACU - 1ST 60 MINS PHASE II: Performed by: SURGERY

## 2020-09-26 PROCEDURE — 88313 SPECIAL STAINS GROUP 2: CPT

## 2020-09-26 PROCEDURE — 160028 HCHG SURGERY MINUTES - 1ST 30 MINS LEVEL 3: Performed by: SURGERY

## 2020-09-26 PROCEDURE — 160025 RECOVERY II MINUTES (STATS): Performed by: SURGERY

## 2020-09-26 PROCEDURE — 700102 HCHG RX REV CODE 250 W/ 637 OVERRIDE(OP): Performed by: SURGERY

## 2020-09-26 PROCEDURE — U0004 COV-19 TEST NON-CDC HGH THRU: HCPCS

## 2020-09-26 PROCEDURE — C9803 HOPD COVID-19 SPEC COLLECT: HCPCS | Performed by: SURGERY

## 2020-09-26 PROCEDURE — 700102 HCHG RX REV CODE 250 W/ 637 OVERRIDE(OP): Performed by: ANESTHESIOLOGY

## 2020-09-26 PROCEDURE — 500002 HCHG ADHESIVE, DERMABOND: Performed by: SURGERY

## 2020-09-26 PROCEDURE — 501837 HCHG SUTURE CV: Performed by: SURGERY

## 2020-09-26 RX ORDER — CEFAZOLIN SODIUM 1 G/3ML
INJECTION, POWDER, FOR SOLUTION INTRAMUSCULAR; INTRAVENOUS PRN
Status: DISCONTINUED | OUTPATIENT
Start: 2020-09-26 | End: 2020-09-26 | Stop reason: SURG

## 2020-09-26 RX ORDER — TRAMADOL HYDROCHLORIDE 50 MG/1
50 TABLET ORAL EVERY 6 HOURS PRN
Status: DISCONTINUED | OUTPATIENT
Start: 2020-09-26 | End: 2020-09-26 | Stop reason: HOSPADM

## 2020-09-26 RX ORDER — SODIUM CHLORIDE, SODIUM LACTATE, POTASSIUM CHLORIDE, CALCIUM CHLORIDE 600; 310; 30; 20 MG/100ML; MG/100ML; MG/100ML; MG/100ML
INJECTION, SOLUTION INTRAVENOUS CONTINUOUS
Status: DISCONTINUED | OUTPATIENT
Start: 2020-09-26 | End: 2020-09-26 | Stop reason: HOSPADM

## 2020-09-26 RX ORDER — METOPROLOL TARTRATE 1 MG/ML
1 INJECTION, SOLUTION INTRAVENOUS
Status: DISCONTINUED | OUTPATIENT
Start: 2020-09-26 | End: 2020-09-26 | Stop reason: HOSPADM

## 2020-09-26 RX ORDER — SODIUM CHLORIDE 9 MG/ML
INJECTION, SOLUTION INTRAVENOUS ONCE
Status: DISCONTINUED | OUTPATIENT
Start: 2020-09-26 | End: 2020-09-26 | Stop reason: CLARIF

## 2020-09-26 RX ORDER — BUPIVACAINE HYDROCHLORIDE AND EPINEPHRINE 5; 5 MG/ML; UG/ML
INJECTION, SOLUTION PERINEURAL
Status: DISCONTINUED | OUTPATIENT
Start: 2020-09-26 | End: 2020-09-26 | Stop reason: HOSPADM

## 2020-09-26 RX ORDER — ONDANSETRON 2 MG/ML
4 INJECTION INTRAMUSCULAR; INTRAVENOUS
Status: DISCONTINUED | OUTPATIENT
Start: 2020-09-26 | End: 2020-09-26 | Stop reason: HOSPADM

## 2020-09-26 RX ORDER — MELOXICAM 15 MG/1
TABLET ORAL
COMMUNITY
End: 2020-10-15 | Stop reason: SDUPTHER

## 2020-09-26 RX ORDER — DIPHENHYDRAMINE HYDROCHLORIDE 50 MG/ML
12.5 INJECTION INTRAMUSCULAR; INTRAVENOUS
Status: DISCONTINUED | OUTPATIENT
Start: 2020-09-26 | End: 2020-09-26 | Stop reason: HOSPADM

## 2020-09-26 RX ADMIN — POVIDONE IODINE 15 ML: 100 SOLUTION TOPICAL at 09:48

## 2020-09-26 RX ADMIN — FENTANYL CITRATE 25 MCG: 50 INJECTION, SOLUTION INTRAMUSCULAR; INTRAVENOUS at 10:49

## 2020-09-26 RX ADMIN — SODIUM CHLORIDE, POTASSIUM CHLORIDE, SODIUM LACTATE AND CALCIUM CHLORIDE: 600; 310; 30; 20 INJECTION, SOLUTION INTRAVENOUS at 09:45

## 2020-09-26 RX ADMIN — FENTANYL CITRATE 25 MCG: 50 INJECTION, SOLUTION INTRAMUSCULAR; INTRAVENOUS at 10:28

## 2020-09-26 RX ADMIN — FENTANYL CITRATE 25 MCG: 50 INJECTION, SOLUTION INTRAMUSCULAR; INTRAVENOUS at 10:32

## 2020-09-26 RX ADMIN — FENTANYL CITRATE 25 MCG: 50 INJECTION, SOLUTION INTRAMUSCULAR; INTRAVENOUS at 10:43

## 2020-09-26 RX ADMIN — TRAMADOL HYDROCHLORIDE 50 MG: 50 TABLET, FILM COATED ORAL at 12:12

## 2020-09-26 RX ADMIN — CEFAZOLIN 1 G: 330 INJECTION, POWDER, FOR SOLUTION INTRAMUSCULAR; INTRAVENOUS at 10:38

## 2020-09-26 ASSESSMENT — PAIN DESCRIPTION - PAIN TYPE
TYPE: SURGICAL PAIN

## 2020-09-26 ASSESSMENT — PAIN SCALES - GENERAL: PAIN_LEVEL: 1

## 2020-09-26 ASSESSMENT — FIBROSIS 4 INDEX: FIB4 SCORE: 1.25

## 2020-09-26 NOTE — DISCHARGE INSTRUCTIONS
Discharge Instructions     Procedure: Temporal artery biopsy     1. ACTIVITIES: Upon discharge from the hospital, the day of surgery it is requested that you do no significant physical activity and no driving as you have had sedation. The day after surgery, you may resume activities of daily living, but for 1 week, no strenuous activities or heavy lifting (greater than 15 pounds).      2. DRIVING: You may drive whenever you are off pain medications and are able to perform the activities needed to drive, i.e. turning, bending, twisting, etc.      3. WOUND: It is not unusual for patients to experience swelling and even bruising, as well as a small amount of drainage from the incision. This is normal and will resolve over the next few days.      4. ICE: please use ice on the wound to decrease the swelling for the first 24 hours and then discontinue. Apply ice for 20 minutes and then remove for 20 minutes, alternating while awake.     5. BATHING: If your incision is covered with skin glue, you do not need to cover it. You can shower starting the day of the operation.   Avoid submerging the incision in water (tub, bath, pool) for at least a week.     6. PAIN MEDICATION: You will be given a prescription for pain medication at discharge. Please take these as directed. It is important to remember not to take medications on an empty stomach as this may cause nausea.      7. BOWEL FUNCTION: After surgery, it is not uncommon for patients to experience constipation. This is due to decreasing activity levels as well as pain medications. You may wish to use a stool softener beginning immediately after surgery, and you may or may not need to use a laxative (Milk of Magnesia, Ex-lax; Senokot, etc.) as well.      8 .CALL IF YOU HAVE: (1) Fevers to more than 101.5 F, (2) Unusual or excessive pain, (3) Drainage or fluid from incision that may be foul smelling, increased tenderness or soreness at the wound or the wound edges are no  longer together, redness or swelling at the incision site. Please do not hesitate to call with any other questions.      9. APPOINTMENT: Contact our office at [phone] for a follow-up appointment about 2 weeks following your procedure.      If you have any additional questions, please do not hesitate to call the office and speak to either myself or the physician on call.      Perry Vale MD, [facility]  [address] [place] [address] [address], [place] [address]  [phone]      ACTIVITY: Rest and take it easy for the first 24 hours.  A responsible adult is recommended to remain with you during that time.  It is normal to feel sleepy.  We encourage you to not do anything that requires balance, judgment or coordination.    MILD FLU-LIKE SYMPTOMS ARE NORMAL. YOU MAY EXPERIENCE GENERALIZED MUSCLE ACHES, THROAT IRRITATION, HEADACHE AND/OR SOME NAUSEA.    FOR 24 HOURS DO NOT:  Drive, operate machinery or run household appliances.  Drink beer or alcoholic beverages.   Make important decisions or sign legal documents.    SPECIAL INSTRUCTIONS: Discharge Instructions     Procedure: Temporal artery biopsy     1. ACTIVITIES: Upon discharge from the hospital, the day of surgery it is requested that you do no significant physical activity and no driving as you have had sedation. The day after surgery, you may resume activities of daily living, but for 1 week, no strenuous activities or heavy lifting (greater than 15 pounds).      2. DRIVING: You may drive whenever you are off pain medications and are able to perform the activities needed to drive, i.e. turning, bending, twisting, etc.      3. WOUND: It is not unusual for patients to experience swelling and even bruising, as well as a small amount of drainage from the incision. This is normal and will resolve over the next few days.      4. ICE: please use ice on the wound to decrease the swelling for the first 24 hours and then discontinue. Apply ice for 20 minutes and then  remove for 20 minutes, alternating while awake.     5. BATHING: If your incision is covered with skin glue, you do not need to cover it. You can shower starting the day of the operation.   Avoid submerging the incision in water (tub, bath, pool) for at least a week.     6. PAIN MEDICATION: You will be given a prescription for pain medication at discharge. Please take these as directed. It is important to remember not to take medications on an empty stomach as this may cause nausea.      7. BOWEL FUNCTION: After surgery, it is not uncommon for patients to experience constipation. This is due to decreasing activity levels as well as pain medications. You may wish to use a stool softener beginning immediately after surgery, and you may or may not need to use a laxative (Milk of Magnesia, Ex-lax; Senokot, etc.) as well.      8 .CALL IF YOU HAVE: (1) Fevers to more than 101.5 F, (2) Unusual or excessive pain, (3) Drainage or fluid from incision that may be foul smelling, increased tenderness or soreness at the wound or the wound edges are no longer together, redness or swelling at the incision site. Please do not hesitate to call with any other questions.      9. APPOINTMENT: Contact our office at [phone] for a follow-up appointment about 2 weeks following your procedure.      If you have any additional questions, please do not hesitate to call the office and speak to either myself or the physician on call.      Perry Vale MD, [facility]  [address] [place] [address] [address], [facility] [address]  [phone]        DIET: To avoid nausea, slowly advance diet as tolerated, avoiding spicy or greasy foods for the first day.  Add more substantial food to your diet according to your physician's instructions.  Babies can be fed formula or breast milk as soon as they are hungry.  INCREASE FLUIDS AND FIBER TO AVOID CONSTIPATION.    SURGICAL DRESSING/BATHING: OK to shower tomorrow, avoid baths, swimming pools and hot tubs  for at least one week.    FOLLOW-UP APPOINTMENT:  A follow-up appointment should be arranged with your doctor in 2 weeks, call to schedule.    You should CALL YOUR PHYSICIAN if you develop:  Fever greater than 101 degrees F.  Pain not relieved by medication, or persistent nausea or vomiting.  Excessive bleeding (blood soaking through dressing) or unexpected drainage from the wound.  Extreme redness or swelling around the incision site, drainage of pus or foul smelling drainage.  Inability to urinate or empty your bladder within 8 hours.  Problems with breathing or chest pain.    You should call 911 if you develop problems with breathing or chest pain.  If you are unable to contact your doctor or surgical center, you should go to the nearest emergency room or urgent care center.  Physician's telephone #: [phone].    If any questions arise, call your doctor.  If your doctor is not available, please feel free to call the Surgical Center at {Surgical Dept Numbers:[id number]}.  The Center is open Monday through Friday from 7AM to 7PM.  You can also call the [email or web address] [email or web address] open 24 hours/day, 7 days/week and speak to a nurse at [phone], or toll free at [phone].    A registered nurse may call you a few days after your surgery to see how you are doing after your procedure.    MEDICATIONS: Resume taking daily medication.  Take prescribed pain medication with food.  If no medication is prescribed, you may take non-aspirin pain medication if needed.  PAIN MEDICATION CAN BE VERY CONSTIPATING.  Take a stool softener or laxative such as senokot, pericolace, or milk of magnesia if needed.    Prescription given for None Given.  Last pain medication given at (Tramadol @12:12    If your physician has prescribed pain medication that includes Acetaminophen (Tylenol), do not take additional Acetaminophen (Tylenol) while taking the prescribed medication.    Depression / Suicide Risk    As you are discharged from this [facility]  Health facility, it is important to learn how to keep safe from harming yourself.    Recognize the warning signs:  · Abrupt changes in personality, positive or negative- including increase in energy   · Giving away possessions  · Change in eating patterns- significant weight changes-  positive or negative  · Change in sleeping patterns- unable to sleep or sleeping all the time   · Unwillingness or inability to communicate  · Depression  · Unusual sadness, discouragement and loneliness  · Talk of wanting to die  · Neglect of personal appearance   · Rebelliousness- reckless behavior  · Withdrawal from people/activities they love  · Confusion- inability to concentrate     If you or a loved one observes any of these behaviors or has concerns about self-harm, here's what you can do:  · Talk about it- your feelings and reasons for harming yourself  · Remove any means that you might use to hurt yourself (examples: pills, rope, extension cords, firearm)  · Get professional help from the community (Mental Health, Substance Abuse, psychological counseling)  · Do not be alone:Call your Safe Contact- someone whom you trust who will be there for you.  · Call your local CRISIS HOTLINE 064-2462 or 373-696-7992  · Call your local Children's Mobile Crisis Response Team [place] (391) 573-4452 or www.[employer]  · Call the toll free National Suicide Prevention Hotlines   · National Suicide Prevention Lifeline 899-187-GSWA (4533)  · National Hope Line Network 800-SUICIDE (354-4586)

## 2020-09-26 NOTE — ANESTHESIA TIME REPORT
Anesthesia Start and Stop Event Times     Date Time Event    9/26/2020 0957 Ready for Procedure     1027 Anesthesia Start     1128 Anesthesia Stop        Responsible Staff  09/26/20    Name Role Begin End    Gerry Kenny M.D. Anesth 1027 1128        Preop Diagnosis (Free Text):  Pre-op Diagnosis     HEADACHE        Preop Diagnosis (Codes):    Post op Diagnosis  History of temporal artery biopsy      Premium Reason  E. Weekend    Comments: PREMIUM TAWANDA

## 2020-09-26 NOTE — ANESTHESIA PREPROCEDURE EVALUATION
Relevant Problems   ANESTHESIA   (+) Obstructive sleep apnea syndrome      CARDIAC   (+) CAD (coronary artery disease)   (+) HTN (hypertension)       Physical Exam    Airway   Mallampati: II  TM distance: >3 FB  Neck ROM: full       Cardiovascular - normal exam  Rhythm: regular  Rate: normal  (-) murmur     Dental - normal exam           Pulmonary - normal exam  Breath sounds clear to auscultation     Abdominal    Neurological - normal exam                 Anesthesia Plan    ASA 3   ASA physical status 3 criteria: hypertension - poorly controlled and COPD    Plan - general       Airway plan will be LMA        Induction: intravenous    Postoperative Plan: Postoperative administration of opioids is intended.    Pertinent diagnostic labs and testing reviewed    Informed Consent:    Anesthetic plan and risks discussed with patient.    Use of blood products discussed with: patient whom consented to blood products.

## 2020-09-26 NOTE — ANESTHESIA QCDR
2019 [facility] Clinical Data Registry (for Quality Improvement)     Postoperative nausea/vomiting risk protocol (Adult = 18 yrs and Pediatric 3-17 yrs)- (430 and 463)  General inhalation anesthetic (NOT TIVA) with PONV risk factors: Yes  Provision of anti-emetic therapy with at least 2 different classes of agents: Yes   Patient DID NOT receive anti-emetic therapy and reason is documented in Medical Record:  N/A    Multimodal Pain Management- (477)  Non-emergent surgery AND patient age >= 18:   Use of Multimodal Pain Management, two or more drugs and/or interventions, NOT including systemic opioids:   Exception: Documented allergy to multiple classes of analgesics:     Smoking Abstinence (404)  Patient is current smoker (cigarette, pipe, e-cig, marijuanna):   Elective Surgery:   Abstinence instructions provided prior to day of surgery:   Patient abstained from smoking on day of surgery:     Pre-Op Beta-Blocker in Isolated CABG (44)  Isolated CABG AND patient age >= 18:   Beta-blocker admin within 24 hours of surgical incision:   Exception:of medical reason(s) for not administering beta blocker within 24 hours prior to surgical incision (e.g., not  indicated,other medical reason):     PACU assessment of acute postoperative pain prior to Anesthesia Care End- Applies to Patients Age = 18- (ABG7)  Initial PACU pain score is which of the following: < 7/10  Patient unable to report pain score: N/A    Post-anesthetic transfer of care checklist/protocol to PACU/ICU- (426 and 427)  Upon conclusion of case, patient transferred to which of the following locations: PACU/Non-ICU  Use of transfer checklist/protocol:   Exclusion: Service Performed in Patient Hospital Room (and thus did not require transfer):   Unplanned admission to ICU related to anesthesia service up through end of PACU care- (MD51)  Unplanned admission to ICU (not initially anticipated at anesthesia start time): No

## 2020-09-26 NOTE — ANESTHESIA POSTPROCEDURE EVALUATION
Patient: Amarjit Khan    Procedure Summary     Date: 09/26/20 Room / Location: [facility] 09 / SURGERY [facility]    Anesthesia Start: 1027 Anesthesia Stop:     Procedure: BIOPSY, ARTERY, TEMPORAL (Right Head) Diagnosis: (HEADACHE)    Surgeon: Perry Vale M.D. Responsible Provider: Gerry Kenny M.D.    Anesthesia Type: general ASA Status: 3          Final Anesthesia Type: general  Last vitals  BP   Blood Pressure : 143/84    Temp   36.2 °C (97.1 °F)    Pulse   Pulse: 69   Resp   18    SpO2   95 %      Anesthesia Post Evaluation    Patient location during evaluation: PACU  Patient participation: complete - patient participated  Level of consciousness: awake and alert  Pain score: 1    Airway patency: patent  Anesthetic complications: no  Cardiovascular status: hemodynamically stable  Respiratory status: acceptable  Hydration status: euvolemic    PONV: none           Nurse Pain Score: 3 (NPRS)

## 2020-09-28 LAB — PATHOLOGY CONSULT NOTE: NORMAL

## 2020-09-29 NOTE — OP REPORT
DATE OF SERVICE:  09/26/2020    PREOPERATIVE DIAGNOSES:  1.  Right-sided headaches and vision changes.  2.  Rule out temporal arteritis.    POSTOPERATIVE DIAGNOSES:  1.  Right-sided headaches and vision changes.  2.  Rule out temporal arteritis.    PROCEDURE:  Right temporal artery biopsy.    SURGEON:  Perry Vale MD    ASSISTANT:  None.    ANESTHESIA:  Local plus sedation.    BLOOD LOSS:  Minimal.    SPECIMENS:  A 2 cm segment of right temporal artery sent to pathology.    DISPOSITION:  The patient tolerated the procedure well.  He was sent to   recovery in stable condition.    DESCRIPTION OF PROCEDURE:  Following informed consent, the patient was placed   supine on the operating table and IV sedation was given to make the patient   comfortable.  The right temple was prepped and draped sterile.  Surgical   time-out was called.  The patient received preoperative antibiotics.    Local anesthetic was infiltrated over the course of the right temporal artery   and then an incision was made overlying the artery measuring about 2-3 cm in   length.  We dissected down carefully using cautery and sharp dissection and we   identified the temporal artery.  It was dissected out for a length of about 2   cm and ligated proximally and distally with silk ties.  The intervening   segment was then removed and sent to pathology.  Hemostasis was good.  The   wound was irrigated and then closed with interrupted 3-0 Vicryl buried dermal   sutures and then skin glue to protect the incision.  The patient tolerated the   procedure well.  All counts were correct.  He was sent to recovery in stable   condition.       ____________________________________     Perry Vale MD    BPJ / NTS    DD:  09/29/2020 08:46:23  DT:  09/29/2020 09:56:42    D#:  [id number]  Job#:  [id number]    cc: TIAN DWYER DO

## 2020-10-06 ENCOUNTER — TELEPHONE (OUTPATIENT)
Dept: MEDICAL GROUP | Facility: MEDICAL CENTER | Age: 80
End: 2020-10-06

## 2020-10-06 NOTE — TELEPHONE ENCOUNTER
Phone Number Called: [phone] (home)     Call outcome: Did not leave a detailed message. Requested patient to call back.    Message: Ask pt to call back to schedule a vv to discuss refill request.

## 2020-10-06 NOTE — TELEPHONE ENCOUNTER
Received refill request for prednisone. I'd like to discuss with patient. Please help arrange video or telephone visit.

## 2020-10-07 NOTE — TELEPHONE ENCOUNTER
Scheduled pt as follow:  Future Appointments       Provider Department Center    10/8/2020 2:40 PM Perry Canchola M.D. [facility]

## 2020-10-08 ENCOUNTER — TELEMEDICINE (OUTPATIENT)
Dept: MEDICAL GROUP | Facility: MEDICAL CENTER | Age: 80
End: 2020-10-08
Payer: MEDICARE

## 2020-10-08 VITALS
HEIGHT: 68 IN | BODY MASS INDEX: 33.04 KG/M2 | DIASTOLIC BLOOD PRESSURE: 64 MMHG | HEART RATE: 78 BPM | WEIGHT: 218 LBS | SYSTOLIC BLOOD PRESSURE: 100 MMHG

## 2020-10-08 DIAGNOSIS — G47.33 OBSTRUCTIVE SLEEP APNEA SYNDROME: ICD-10-CM

## 2020-10-08 DIAGNOSIS — R42 DIZZINESS: ICD-10-CM

## 2020-10-08 PROCEDURE — 99441 PR PHYSICIAN TELEPHONE EVALUATION 5-10 MIN: CPT | Performed by: FAMILY MEDICINE

## 2020-10-08 ASSESSMENT — FIBROSIS 4 INDEX: FIB4 SCORE: 1.25

## 2020-10-08 NOTE — ASSESSMENT & PLAN NOTE
Patient's dizziness has improved overall but he still describes positional vertiginous dizziness associated with nausea and fatigue.  He has some upcoming neuroimaging and echocardiogram.  He continues to follow with neuro-ophthalmology and his rheumatologist has recently reduced his prednisone to 30 mg daily.  He will be seeing an audiologist soon.

## 2020-10-08 NOTE — PROGRESS NOTES
Telephone Appointment Visit   As a means of avoiding spread of COVID-19, this visit is being conducted by telephone. This telephone visit was initiated by the patient and they verbally consented.    Time at start of call: 3:02 pm    Reason for Call:  Symptom Follow-up    HPI:      Dizziness  Patient's dizziness has improved overall but he still describes positional vertiginous dizziness associated with nausea and fatigue.  He has some upcoming neuroimaging and echocardiogram.  He continues to follow with neuro-ophthalmology and his rheumatologist has recently reduced his prednisone to 30 mg daily.  He will be seeing an audiologist soon.    Obstructive sleep apnea syndrome  Patient scheduled his sleep medicine visit for February.       Labs / Images Reviewed:     Reviewed     Assessment and Plan:     1. Dizziness  - f/u neuro-ophtho and rheum.  I agree with the CT angiogram of the head and am happy the patient will be seeing audiology tomorrow.  This could just very well be a very severe benign positional vertigo.  His temporal artery biopsy was negative for temporal arteritis.  His rheumatologist is tapering the prednisone.    2. Obstructive sleep apnea syndrome  - f/u sleep medicine.     Follow-up: 6 weeks.     Time at end of call: 3:11 pm.   Total Time Spent: 5-10 minutes    Perry Canchola M.D.

## 2020-10-15 RX ORDER — MELOXICAM 15 MG/1
TABLET ORAL
Qty: 30 TAB | Refills: 0 | Status: SHIPPED | OUTPATIENT
Start: 2020-10-15 | End: 2020-10-15

## 2020-10-15 RX ORDER — MELOXICAM 15 MG/1
TABLET ORAL
Qty: 90 TAB | Refills: 0 | Status: SHIPPED | OUTPATIENT
Start: 2020-10-15 | End: 2021-04-07

## 2020-10-16 ENCOUNTER — HOSPITAL ENCOUNTER (OUTPATIENT)
Dept: RADIOLOGY | Facility: MEDICAL CENTER | Age: 80
End: 2020-10-16
Attending: PSYCHIATRY & NEUROLOGY
Payer: MEDICARE

## 2020-10-16 DIAGNOSIS — G45.3 AMAUROSIS FUGAX: ICD-10-CM

## 2020-10-16 DIAGNOSIS — M31.6 TEMPORAL ARTERITIS (HCC): ICD-10-CM

## 2020-10-16 DIAGNOSIS — H81.11 BPPV (BENIGN PAROXYSMAL POSITIONAL VERTIGO), RIGHT: ICD-10-CM

## 2020-10-16 PROCEDURE — 70498 CT ANGIOGRAPHY NECK: CPT

## 2020-10-16 PROCEDURE — 700117 HCHG RX CONTRAST REV CODE 255: Performed by: PSYCHIATRY & NEUROLOGY

## 2020-10-16 PROCEDURE — 70496 CT ANGIOGRAPHY HEAD: CPT

## 2020-10-16 RX ADMIN — IOHEXOL 100 ML: 350 INJECTION, SOLUTION INTRAVENOUS at 19:15

## 2020-10-16 NOTE — ASSESSMENT & PLAN NOTE
The patient has coronary artery disease. He follows with cardiology. He is currently maintained on baby aspirin and atorvastatin 80 mg daily. He denies chest pain and shortness of breath.  His cardiologist is aware of his recent complaints regarding fatigue.   Detail Level: Detailed

## 2020-10-20 ENCOUNTER — HOSPITAL ENCOUNTER (OUTPATIENT)
Dept: CARDIOLOGY | Facility: MEDICAL CENTER | Age: 80
End: 2020-10-20
Attending: PSYCHIATRY & NEUROLOGY
Payer: MEDICARE

## 2020-10-20 DIAGNOSIS — M31.6 TEMPORAL ARTERITIS (HCC): ICD-10-CM

## 2020-10-20 DIAGNOSIS — H81.11 BPPV (BENIGN PAROXYSMAL POSITIONAL VERTIGO), RIGHT: ICD-10-CM

## 2020-10-20 DIAGNOSIS — G45.3 AMAUROSIS FUGAX: ICD-10-CM

## 2020-10-20 LAB
LV EJECT FRACT  99904: 70
LV EJECT FRACT MOD 2C 99903: 65.47
LV EJECT FRACT MOD 4C 99902: 71.7
LV EJECT FRACT MOD BP 99901: 68.07

## 2020-10-20 PROCEDURE — 93306 TTE W/DOPPLER COMPLETE: CPT | Mod: 26 | Performed by: INTERNAL MEDICINE

## 2020-10-20 PROCEDURE — 93306 TTE W/DOPPLER COMPLETE: CPT

## 2020-10-22 ENCOUNTER — TELEPHONE (OUTPATIENT)
Dept: MEDICAL GROUP | Facility: MEDICAL CENTER | Age: 80
End: 2020-10-22

## 2020-10-22 DIAGNOSIS — R42 DIZZINESS: ICD-10-CM

## 2020-10-23 NOTE — TELEPHONE ENCOUNTER
Called Dr. Diana's office [phone], spoke with their office and they'll fax the recent report.

## 2020-10-23 NOTE — TELEPHONE ENCOUNTER
Neuro consult placed. I believe the patient has been following with Dr. Diana as well. I believe he's board certified in neurology. Please request Dr. Diana notes.

## 2020-10-23 NOTE — TELEPHONE ENCOUNTER
Pt saw Dr. Browne (Rheum) and he suggested pt sees a Neurologist and reviews medications with Dr. Canchola due to persistent dizziness.  Pt. Saw Dr. Mcgrath (ENT), got a test and stated that his condition could be neurological as well.  Dr. Canchola, please advise...

## 2020-10-26 ENCOUNTER — TELEMEDICINE (OUTPATIENT)
Dept: MEDICAL GROUP | Facility: MEDICAL CENTER | Age: 80
End: 2020-10-26
Payer: MEDICARE

## 2020-10-26 VITALS
SYSTOLIC BLOOD PRESSURE: 112 MMHG | HEIGHT: 67 IN | BODY MASS INDEX: 34.14 KG/M2 | DIASTOLIC BLOOD PRESSURE: 68 MMHG | HEART RATE: 90 BPM

## 2020-10-26 DIAGNOSIS — F32.A DEPRESSION, UNSPECIFIED DEPRESSION TYPE: ICD-10-CM

## 2020-10-26 DIAGNOSIS — R42 DIZZINESS: ICD-10-CM

## 2020-10-26 DIAGNOSIS — M31.6 TEMPORAL ARTERITIS (HCC): ICD-10-CM

## 2020-10-26 DIAGNOSIS — R53.83 OTHER FATIGUE: ICD-10-CM

## 2020-10-26 PROCEDURE — 99443 PR PHYSICIAN TELEPHONE EVALUATION 21-30 MIN: CPT | Performed by: FAMILY MEDICINE

## 2020-10-26 RX ORDER — PROMETHAZINE HYDROCHLORIDE 12.5 MG/1
12.5 TABLET ORAL 2 TIMES DAILY PRN
Qty: 30 TAB | Refills: 0 | Status: SHIPPED | OUTPATIENT
Start: 2020-10-26 | End: 2021-04-07

## 2020-10-26 RX ORDER — PREDNISONE 20 MG/1
20 TABLET ORAL
Qty: 90 TAB | Refills: 0
Start: 2020-10-26 | End: 2021-02-22

## 2020-10-26 NOTE — ASSESSMENT & PLAN NOTE
"Please see discussion regarding \"dizziness\".  He has an appointment upcoming with sleep medicine.  "

## 2020-10-26 NOTE — ASSESSMENT & PLAN NOTE
The patient has been struggling with about 2 months of dizziness which is positional associated with nausea and fatigue.  There was associated visual change and an elevated ESR in the setting of a headache.  For this reason, the patient was started on prednisone in case it was temporal arteritis but he ultimately ended up having a temporal artery biopsy which was negative.  He is currently taking prednisone 30 mg daily and I spoke with his rheumatologist today who is okay with moving him down to 20 mg daily.  Patient has also been following with Dr. Diana (neuro-ophthalmology).  I have requested notes but have not received them.  The patient tells me that Dr. Gonzalez does not seem to found any concerning things.  He has seen audiology and vestibular therapy with limited success.  Overall, his symptoms are doing better but still bothersome to the patient.  Meclizine was tried without success in the past.  He has undergone a CT scan of the head, CT angiogram of the head and neck and echocardiogram, all of which have been reassuring.

## 2020-10-26 NOTE — ASSESSMENT & PLAN NOTE
The patient continues to describe some depression associated with his many comorbidities.  He has seen a psychologist through his pain doctor in the past but did not find it that helpful.  At the last visit, he expressed to me that he was interested in seeing a psychiatrist but he did not follow through on this and is no longer interested.  He is currently on Cymbalta, primarily for pain.

## 2020-10-26 NOTE — PROGRESS NOTES
"    Telephone Appointment Visit   As a means of avoiding spread of COVID-19, this visit is being conducted by telephone. This telephone visit was initiated by the patient and they verbally consented.    Time at start of call: 3:46 pm    Reason for Call:  Symptom Follow-up    HPI:      Depression  The patient continues to describe some depression associated with his many comorbidities.  He has seen a psychologist through his pain doctor in the past but did not find it that helpful.  At the last visit, he expressed to me that he was interested in seeing a psychiatrist but he did not follow through on this and is no longer interested.  He is currently on Cymbalta, primarily for pain.    Dizziness  The patient has been struggling with about 2 months of dizziness which is positional associated with nausea and fatigue.  There was associated visual change and an elevated ESR in the setting of a headache.  For this reason, the patient was started on prednisone in case it was temporal arteritis but he ultimately ended up having a temporal artery biopsy which was negative.  He is currently taking prednisone 30 mg daily and I spoke with his rheumatologist today who is okay with moving him down to 20 mg daily.  Patient has also been following with Dr. Diana (neuro-ophthalmology).  I have requested notes but have not received them.  The patient tells me that Dr. Gonzalez does not seem to found any concerning things.  He has seen audiology and vestibular therapy with limited success.  Overall, his symptoms are doing better but still bothersome to the patient.  Meclizine was tried without success in the past.  He has undergone a CT scan of the head, CT angiogram of the head and neck and echocardiogram, all of which have been reassuring.    Fatigue  Please see discussion regarding \"dizziness\".  He has an appointment upcoming with sleep medicine.       Labs / Images Reviewed:     Reviewed.     Assessment and Plan:     1. " Temporal arteritis (HCC)  Essentially ruled out, reduce prednisone to 20 mg daily. Discussed with rheum.   - predniSONE (DELTASONE) 20 MG Tab; Take 1 Tab by mouth every day.  Dispense: 90 Tab; Refill: 0    2. Depression, unspecified depression type  There may certainly be a significant psychogenic overlay including depression.  The patient is reluctant to see a counselor or psychiatrist but will continue on Cymbalta.    3. Dizziness  Patient clearly has vertigo I think this is the cause of his nausea, however, there are also a number of other unusual and concerning symptoms without clear etiology despite an aggressive work-up at this point.  I do think seeing a neurologist would be wise and, in order to round out the work-up and considering his history, I think seeing a cardiologist would also be zamarripa.  I wonder if he would benefit from a stress test or Holter monitor.  His echocardiogram is reassuring.  - REFERRAL TO CARDIOLOGY General Cardiology [facility]    4. Other fatigue  I think this is multifactorial including depression, sleep apnea.  Considering his cardiac history, we will also have him go back to see his cardiologist.  - REFERRAL TO CARDIOLOGY General Cardiology [facility]      Follow-up: 3 weeks.    Time at end of call: 4:04 pm  Total Time Spent: 21-30 minutes    Perry Canchola M.D.

## 2020-11-11 ENCOUNTER — HOSPITAL ENCOUNTER (OUTPATIENT)
Dept: HOSPITAL 8 - CFH | Age: 80
Discharge: HOME | End: 2020-11-11
Attending: INTERNAL MEDICINE
Payer: MEDICARE

## 2020-11-11 DIAGNOSIS — I10: ICD-10-CM

## 2020-11-11 DIAGNOSIS — R53.82: ICD-10-CM

## 2020-11-11 DIAGNOSIS — I25.10: Primary | ICD-10-CM

## 2020-11-11 PROCEDURE — A9502 TC99M TETROFOSMIN: HCPCS

## 2020-11-11 PROCEDURE — 78452 HT MUSCLE IMAGE SPECT MULT: CPT

## 2020-11-11 PROCEDURE — 93017 CV STRESS TEST TRACING ONLY: CPT

## 2020-11-23 ENCOUNTER — OFFICE VISIT (OUTPATIENT)
Dept: MEDICAL GROUP | Facility: MEDICAL CENTER | Age: 80
End: 2020-11-23
Payer: MEDICARE

## 2020-11-23 VITALS
BODY MASS INDEX: 35.25 KG/M2 | HEART RATE: 89 BPM | RESPIRATION RATE: 18 BRPM | HEIGHT: 67 IN | WEIGHT: 224.6 LBS | SYSTOLIC BLOOD PRESSURE: 126 MMHG | TEMPERATURE: 97.5 F | OXYGEN SATURATION: 96 % | DIASTOLIC BLOOD PRESSURE: 68 MMHG

## 2020-11-23 DIAGNOSIS — F32.A DEPRESSION, UNSPECIFIED DEPRESSION TYPE: ICD-10-CM

## 2020-11-23 DIAGNOSIS — R73.9 ELEVATED BLOOD SUGAR: ICD-10-CM

## 2020-11-23 DIAGNOSIS — E78.5 DYSLIPIDEMIA: ICD-10-CM

## 2020-11-23 DIAGNOSIS — I10 ESSENTIAL HYPERTENSION: ICD-10-CM

## 2020-11-23 DIAGNOSIS — R42 DIZZINESS: ICD-10-CM

## 2020-11-23 DIAGNOSIS — M25.559 HIP PAIN: ICD-10-CM

## 2020-11-23 DIAGNOSIS — R53.83 OTHER FATIGUE: ICD-10-CM

## 2020-11-23 DIAGNOSIS — I25.83 CORONARY ARTERY DISEASE DUE TO LIPID RICH PLAQUE: ICD-10-CM

## 2020-11-23 DIAGNOSIS — I25.10 CORONARY ARTERY DISEASE DUE TO LIPID RICH PLAQUE: ICD-10-CM

## 2020-11-23 DIAGNOSIS — I48.0 PAROXYSMAL ATRIAL FIBRILLATION (HCC): ICD-10-CM

## 2020-11-23 PROBLEM — H53.9 VISUAL CHANGES: Status: RESOLVED | Noted: 2020-08-31 | Resolved: 2020-11-23

## 2020-11-23 PROBLEM — J02.9 SORE THROAT: Status: RESOLVED | Noted: 2019-11-18 | Resolved: 2020-11-23
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
## 2020-11-23 PROBLEM — M94.0 COSTOCHONDRITIS: Status: RESOLVED | Noted: 2019-11-11 | Resolved: 2020-11-23

## 2020-11-23 PROCEDURE — 99214 OFFICE O/P EST MOD 30 MIN: CPT | Performed by: FAMILY MEDICINE

## 2020-11-23 RX ORDER — ZONISAMIDE 25 MG/1
25 CAPSULE ORAL DAILY
Status: ON HOLD | COMMUNITY
Start: 2020-11-20 | End: 2021-11-02

## 2020-11-23 RX ORDER — RIVAROXABAN 20 MG/1
20 TABLET, FILM COATED ORAL
COMMUNITY
Start: 2020-11-19 | End: 2022-06-20 | Stop reason: SDUPTHER

## 2020-11-23 ASSESSMENT — PATIENT HEALTH QUESTIONNAIRE - PHQ9
5. POOR APPETITE OR OVEREATING: NOT AT ALL
9. THOUGHTS THAT YOU WOULD BE BETTER OFF DEAD, OR OF HURTING YOURSELF: NOT AT ALL
SUM OF ALL RESPONSES TO PHQ QUESTIONS 1-9: 0
4. FEELING TIRED OR HAVING LITTLE ENERGY: NOT AT ALL
1. LITTLE INTEREST OR PLEASURE IN DOING THINGS: NOT AT ALL
SUM OF ALL RESPONSES TO PHQ9 QUESTIONS 1 AND 2: 0
2. FEELING DOWN, DEPRESSED, IRRITABLE, OR HOPELESS: NOT AT ALL
8. MOVING OR SPEAKING SO SLOWLY THAT OTHER PEOPLE COULD HAVE NOTICED. OR THE OPPOSITE, BEING SO FIGETY OR RESTLESS THAT YOU HAVE BEEN MOVING AROUND A LOT MORE THAN USUAL: NOT AT ALL
3. TROUBLE FALLING OR STAYING ASLEEP OR SLEEPING TOO MUCH: NOT AT ALL
6. FEELING BAD ABOUT YOURSELF - OR THAT YOU ARE A FAILURE OR HAVE LET YOURSELF OR YOUR FAMILY DOWN: NOT AL ALL
7. TROUBLE CONCENTRATING ON THINGS, SUCH AS READING THE NEWSPAPER OR WATCHING TELEVISION: NOT AT ALL

## 2020-11-23 ASSESSMENT — FIBROSIS 4 INDEX: FIB4 SCORE: 1.25

## 2020-11-23 NOTE — ASSESSMENT & PLAN NOTE
Patient has some significant fatigue.  He is maintained on CPAP for his obstructive sleep apnea.  Last TSH and hemoglobin were at goal.  Patient does have depression treated with Cymbalta. He has declined psychiatry consult.  He was recently diagnosed with atrial fibrillation and is currently on a rate control strategy.

## 2020-11-23 NOTE — ASSESSMENT & PLAN NOTE
About 2 weeks ago the patient fell onto the left hip, which he attributes to his vertigo.  Since then, he has had some left hip pain.  It is located laterally today but sometimes can be inguinal.  It comes and goes.  It is getting better.

## 2020-11-23 NOTE — PROGRESS NOTES
[facility]  Progress Note  Established Patient    Subjective:   Amarjit Khan is a 80 y.o. male here today with a chief complaint of dizziness. The patient is accompanied by his wife, all of us are masked.     CAD (coronary artery disease)  Patient has a history of heart attack and is currently maintained on atorvastatin, Xarelto and baby aspirin.  He follows with cardiology at [place].    Dyslipidemia  Patient's dyslipidemia is controlled with atorvastatin.    HTN (hypertension)  This is at goal.    Fatigue  Patient has some significant fatigue.  He is maintained on CPAP for his obstructive sleep apnea.  Last TSH and hemoglobin were at goal.  Patient does have depression treated with Cymbalta. He has declined psychiatry consult.  He was recently diagnosed with atrial fibrillation and is currently on a rate control strategy.    Elevated blood sugar  Noted on past labs, the patient is currently maintained on prednisone.    Dizziness  This is been extensively evaluated and determined to be caused by BPPV.  Patient states that is now over 90% better.  At one point, it was thought that he could have temporal arteritis and he was started on high-dose steroids which are being tapered.    Depression  Patient is maintained on Cymbalta.  He has been reluctant to see a psychologist or psychiatrist.    Hip pain  About 2 weeks ago the patient fell onto the left hip, which he attributes to his vertigo.  Since then, he has had some left hip pain.  It is located laterally today but sometimes can be inguinal.  It comes and goes.  It is getting better.    Paroxysmal atrial fibrillation (HCC)  The patient was recently diagnosed with atrial fibrillation and was started on Xarelto.  He is on a rate control strategy.      Current Outpatient Medications on File Prior to Visit   Medication Sig Dispense Refill   • XARELTO 20 MG Tab tablet      • promethazine (PHENERGAN) 12.5 MG tablet Take 1 Tab by mouth 2 times a day as  needed for Nausea/Vomiting. 30 Tab 0   • predniSONE (DELTASONE) 20 MG Tab Take 1 Tab by mouth every day. (Patient taking differently: Take 10 mg by mouth every day.) 90 Tab 0   • meloxicam (MOBIC) 15 MG tablet TAKE 1 TABLET BY MOUTH DAILY WITH FOOD AS NEEDED FOR PAIN. DO NOT TAKE MORE THAN 7 DAYS STRAIGHT 90 Tab 0   • DULoxetine (CYMBALTA) 60 MG Cap DR Particles delayed-release capsule Take 1 Cap by mouth every day. 90 Cap 0   • metoprolol SR (TOPROL XL) 25 MG TABLET SR 24 HR Take 0.5 Tabs by mouth 2 Times a Day. 180 Tab 0   • lisinopril-hydrochlorothiazide (PRINZIDE, ZESTORETIC) 20-12.5 MG per tablet Take 1 Tab by mouth every day. 30 Tab 0   • atorvastatin (LIPITOR) 80 MG tablet Take 40 mg by mouth every day.     • tramadol (ULTRAM) 50 MG Tab Take 50 mg by mouth every four hours as needed.     • HYDROmorphone (DILAUDID) 2 MG Tab Take 1 mg by mouth 2 times a day as needed for Severe Pain.     • aspirin (ASA) 81 MG Chew Tab chewable tablet Take 1 Tab by mouth every day. 100 Tab 0   • Cholecalciferol (VITAMIN D) 2000 UNIT CAPS Take 2,000 Units by mouth every day.     • zonisamide (ZONEGRAN) 25 MG capsule Take 25 mg by mouth every day. Take 1 capsule by mouth two times a day       No current facility-administered medications on file prior to visit.        Past Medical History:   Diagnosis Date   • Acute MI (HCC) 1997    cardiologist, Dr. Hernández   • Allergy    • Arthritis    • Cancer (HCC)     Skin   • High cholesterol    • Hyperlipidemia    • Hypertension    • Muscle disorder    • Personal history of venous thrombosis and embolism 2004    left arm   • Rectal abscess    • Rheumatic fever as child   • Sleep apnea     CPAP   • Unspecified cataract     surgical correction bilateral       Allergies: Butrans [buprenorphine], Codeine, Darvocet [propoxyphene n-apap], Fentanyl, Nucynta [tapentadol], Percocet [oxycodone-acetaminophen], Ambien [zolpidem], Biaxin [clarithromycin], Buspar [buspirone], Celexa, Citalopram,  "Clindamycin, Clonazepam, Demerol, Diltiazem, Doxepin, Effexor [venlafaxine], Erythromycin, Haldol [haloperidol], Hydrocodone, Lexapro, Lunesta, Lyrica, Morphine sulfate [bupropion], Remeron [mirtazapine], Serzone [nefazodone], Tape, Tizanidine hcl, Valium, Xanax [alprazolam], Zonisamide, and Zyprexa    Surgical History:  has a past surgical history that includes anal fistulotomy (8/27/08); pr percut implnt neuroelect,epidural (7/15/2015); pr percut implnt neuroelect,epidural (7/15/2015); cataract extraction with iol (Bilateral, 2010); stent placement (2005,2010); knee arthrotomy (Right, 1990); knee arthrotomy (Right, 1992); knee arthroscopy (Left, 1995); nerve ulnar transfer (Right, 2004); elbow arthrotomy (Left, 1998); shoulder arthrotomy (Right, 2006); lumbar laminectomy diskectomy (1988); hip arthroplasty total (Left, 2/2015); spinal cord stimulator (7/29/2015); eye surgery; and pr temporal artery ligatn or bx (Right, 9/26/2020).    Family History: family history includes Diabetes in his mother; Heart Disease in his mother; Hyperlipidemia in his mother.    Social History:  reports that he has never smoked. He has never used smokeless tobacco. He reports current alcohol use. He reports that he does not use drugs.    ROS: no fever or cough.        Objective:     Vitals:    11/23/20 1401   BP: 126/68   BP Location: Left arm   Patient Position: Sitting   BP Cuff Size: Adult long   Pulse: 89   Resp: 18   Temp: 36.4 °C (97.5 °F)   TempSrc: Temporal   SpO2: 96%   Weight: 101.9 kg (224 lb 9.6 oz)   Height: 1.702 m (5' 7\")       Physical Exam:  General: alert in no apparent distress.   MSK: No tenderness palpation over the spine or hips bilaterally.  No trochanteric bursal tenderness.  Unable to complete ALEJANDRO testing because the patient reports longstanding range of motion imitations.    Assessment and Plan:     1. Hip pain  We will get the following x-rays.  Recommended Voltaren cream to the left trochanteric bursa.  - " DX-HIP-BILATERAL-WITH PELVIS-2 VIEWS; Future  - DX-LUMBAR SPINE-2 OR 3 VIEWS; Future    2. Paroxysmal atrial fibrillation (HCC)  - f/u cardiology.   -If the patient's fatigue persists, we may need to consider a rhythm control strategy.    3. Coronary artery disease due to lipid rich plaque  -Continue aspirin and statin therapy.    4. Dizziness  I am happy to see that this has greatly improved.  - patient will be seeing neurology next week.   - he's already seen cardiology, rheum and neuro-ophthalmology.     5. Depression, unspecified depression type  -Continue Cymbalta.    6. Dyslipidemia  -Continue statin.    7. Essential hypertension  -Continue current regimen.    8. Other fatigue  I think this is multifactorial including the patient's obstructive sleep apnea, polypharmacy, atrial fibrillation.  We will get a TSH.  Last hemoglobin is normal.  He will continue his CPAP.  If his fatigue persists, we may need to consider rhythm control strategy.  - TSH WITH REFLEX TO FT4; Future    9. Elevated blood sugar  - HEMOGLOBIN A1C; Future        Followup: Return in about 6 months (around 5/23/2021), or if symptoms worsen or fail to improve.

## 2020-11-23 NOTE — ASSESSMENT & PLAN NOTE
Patient has a history of heart attack and is currently maintained on atorvastatin, Xarelto and baby aspirin.  He follows with cardiology at [place].

## 2020-11-23 NOTE — ASSESSMENT & PLAN NOTE
This is been extensively evaluated and determined to be caused by BPPV.  Patient states that is now over 90% better.  At one point, it was thought that he could have temporal arteritis and he was started on high-dose steroids which are being tapered.

## 2020-11-23 NOTE — ASSESSMENT & PLAN NOTE
The patient was recently diagnosed with atrial fibrillation and was started on Xarelto.  He is on a rate control strategy.

## 2020-12-01 ENCOUNTER — TELEPHONE (OUTPATIENT)
Dept: MEDICAL GROUP | Facility: MEDICAL CENTER | Age: 80
End: 2020-12-01

## 2020-12-01 NOTE — TELEPHONE ENCOUNTER
Patient didn't read [employer] message. Please relay the message below to this patient.     Amandeep Ocasio,     At our visit today I forgot to give you a lab slip.  I was hoping you could get nonfasting lab sometime within the next few weeks.     Take care,   BH

## 2020-12-02 ENCOUNTER — HOSPITAL ENCOUNTER (OUTPATIENT)
Dept: RADIOLOGY | Facility: MEDICAL CENTER | Age: 80
End: 2020-12-02
Attending: FAMILY MEDICINE
Payer: MEDICARE

## 2020-12-02 DIAGNOSIS — M25.559 HIP PAIN: ICD-10-CM

## 2020-12-02 PROCEDURE — 72100 X-RAY EXAM L-S SPINE 2/3 VWS: CPT

## 2020-12-02 PROCEDURE — 73521 X-RAY EXAM HIPS BI 2 VIEWS: CPT

## 2020-12-09 LAB
ALBUMIN SERPL-MCNC: 4 G/DL (ref 3.7–4.7)
ALBUMIN/GLOB SERPL: 1.9 {RATIO} (ref 1.2–2.2)
ALP SERPL-CCNC: 59 IU/L (ref 39–117)
ALT SERPL-CCNC: 37 IU/L (ref 0–44)
AST SERPL-CCNC: 19 IU/L (ref 0–40)
BILIRUB SERPL-MCNC: 1.4 MG/DL (ref 0–1.2)
BUN SERPL-MCNC: 20 MG/DL (ref 8–27)
BUN/CREAT SERPL: 18 (ref 10–24)
CALCIUM SERPL-MCNC: 9.5 MG/DL (ref 8.6–10.2)
CHLORIDE SERPL-SCNC: 102 MMOL/L (ref 96–106)
CO2 SERPL-SCNC: 20 MMOL/L (ref 20–29)
CREAT SERPL-MCNC: 1.13 MG/DL (ref 0.76–1.27)
GLOBULIN SER CALC-MCNC: 2.1 G/DL (ref 1.5–4.5)
GLUCOSE SERPL-MCNC: 173 MG/DL (ref 65–99)
HBA1C MFR BLD: 7.1 % (ref 4.8–5.6)
POTASSIUM SERPL-SCNC: 4.4 MMOL/L (ref 3.5–5.2)
PROT SERPL-MCNC: 6.1 G/DL (ref 6–8.5)
SODIUM SERPL-SCNC: 140 MMOL/L (ref 134–144)
TSH SERPL DL<=0.005 MIU/L-ACNC: 3.17 UIU/ML (ref 0.45–4.5)

## 2021-01-11 DIAGNOSIS — Z23 NEED FOR VACCINATION: ICD-10-CM

## 2021-01-20 ENCOUNTER — PATIENT MESSAGE (OUTPATIENT)
Dept: MEDICAL GROUP | Facility: MEDICAL CENTER | Age: 81
End: 2021-01-20

## 2021-01-20 DIAGNOSIS — R46.89 COGNITIVE AND BEHAVIORAL CHANGES: ICD-10-CM

## 2021-01-20 DIAGNOSIS — R41.89 COGNITIVE AND BEHAVIORAL CHANGES: ICD-10-CM

## 2021-01-20 DIAGNOSIS — R53.81 DEBILITY: ICD-10-CM

## 2021-01-26 NOTE — TELEPHONE ENCOUNTER
Recommend occupational therapy evaluation to formally evaluate patient's ADL and cognitive abilities. May I order this for the patient.

## 2021-01-26 NOTE — PATIENT COMMUNICATION
Pt left a mess stating that he needs medical help due to his falling and back problem.  Dr. Canchola, please note...

## 2021-02-10 ENCOUNTER — APPOINTMENT (OUTPATIENT)
Dept: FAMILY PLANNING/WOMEN'S HEALTH CLINIC | Facility: IMMUNIZATION CENTER | Age: 81
End: 2021-02-10
Attending: INTERNAL MEDICINE
Payer: MEDICARE

## 2021-02-10 ENCOUNTER — IMMUNIZATION (OUTPATIENT)
Dept: FAMILY PLANNING/WOMEN'S HEALTH CLINIC | Facility: IMMUNIZATION CENTER | Age: 81
End: 2021-02-10
Payer: MEDICARE

## 2021-02-10 DIAGNOSIS — Z23 NEED FOR VACCINATION: ICD-10-CM

## 2021-02-10 DIAGNOSIS — Z23 ENCOUNTER FOR VACCINATION: Primary | ICD-10-CM

## 2021-02-10 PROCEDURE — 0001A PFIZER SARS-COV-2 VACCINE: CPT

## 2021-02-10 PROCEDURE — 91300 PFIZER SARS-COV-2 VACCINE: CPT

## 2021-02-18 ENCOUNTER — TELEPHONE (OUTPATIENT)
Dept: MEDICAL GROUP | Facility: MEDICAL CENTER | Age: 81
End: 2021-02-18

## 2021-02-18 ENCOUNTER — SPEECH THERAPY (OUTPATIENT)
Dept: SPEECH THERAPY | Facility: REHABILITATION | Age: 81
End: 2021-02-18
Attending: FAMILY MEDICINE
Payer: MEDICARE

## 2021-02-18 DIAGNOSIS — R46.89 COGNITIVE AND BEHAVIORAL CHANGES: ICD-10-CM

## 2021-02-18 DIAGNOSIS — R41.89 COGNITIVE AND BEHAVIORAL CHANGES: ICD-10-CM

## 2021-02-18 DIAGNOSIS — R41.841 COGNITIVE COMMUNICATION DEFICIT: ICD-10-CM

## 2021-02-18 PROCEDURE — 92523 SPEECH SOUND LANG COMPREHEN: CPT

## 2021-02-18 ASSESSMENT — ENCOUNTER SYMPTOMS: PAIN SCALE: 7

## 2021-02-18 NOTE — TELEPHONE ENCOUNTER
----- Message from Flora Garcia, Med Ass't sent at 2/17/2021  4:42 PM PST -----  Regarding: FW: Prescription Question  Contact: [phone]      ----- Message -----  From: Amarjit Ayersakosua Khan  Sent: 2/17/2021   4:37 PM PST  To: [facility]  Subject: Prescription Question                            Hi Dr. Canchola, I am writing the message for my father Amarjit.    Both my parents dosage of Duloxetine was increased from 60mg to 120mg during their past visit with you on Feb 2.  They are concerned about taking 2 pills to get to the 120mg dosage as told by their pharmacist.  Can they take both pills at the same time?  Could you also call in a new Rx with the correct dosage at some point?  They will run out in ~10 days with the current amount.    Pharmacy is [facility] on [address] in [place].      Thank you,  Chon Khan

## 2021-02-18 NOTE — OP THERAPY EVALUATION
Outpatient Speech Therapy  INITIAL EVALUATION    [address]  [address]  [facility] [address]  Phone:  [phone]  Fax:  [phone]    Date of Evaluation: 2021    Patient: Amarjit Khan  YOB: 1940  MRN: [id number]     Referring Provider: Perry Canchola M.D.  [address] [facility]  [address]  [place] [address]   Referring Diagnosis Cognitive and behavioral changes [R41.89, R46.89]     Time Calculation    Start time: 1410  Stop time: 1500 Time Calculation (min): 50 minutes           Chief Complaint: Poor Memory    Visit Diagnoses     ICD-10-CM   1. Cognitive and behavioral changes  R41.89    R46.89   2. Cognitive communication deficit  R41.841     Subjective:   Reason for Therapy:     Reason For Evaluation:  Cognition and Speech/Language    Onset Date:  2018    Onset Description:  Patient is a 80 year old male, referred to outpatient speech therapy in the setting of increased concern regarding cognitive decline. Deficits in cognitive function were reported by patient as difficulty in management of medications and memory. Patient also with concerns regarding increased presence of falls and reduced ability to complete activities of daily living. Patient was also referred to outpatient Occupational Therapy as a result.       Social Support:     Accompanied By:  Spouse (Patient arrived unaccompanied to outpatient speech therapy evaluation. Wife is reported as supportive, with wife now assisting with medication management through use of pill box.)    Patient Mental Status:  Responsive and Alert (very pleasant; good historian)  Progress Factors:     Progression:  Getting worse  Pain:     Current pain ratin (headache and back)  Therapy History:     Previous Treatments and Dates:  Patient has recently started participation in outpatient Physical Therapy to address presence of back pain at [facility].     Hearing:  Hard of hearing     "Vision:  Eye Glasses    Handedness:  Right-handed  Additional Subjective Comments:      Patient arrived 10 minutes late to scheduled evaluation, reporting he \"got lost\" even with use of GPS. Patient was pleasant and cooperative throughout today's evaluation.     Patient reports contributing factor to overall changes to memory is amount of pain he is currently experiencing related to back pain. He describes deficit as \"getting lost\". Patient states it is easy to loose track of what he is doing because of the pain. Patient acknowledges that he is having increased difficulty in management of medications, requiring extra steps and use of pill box to assist in management of medications at this time. Wife is assisting with medications at this time to ensure accuracy.     Patient states a sense of urgency in need to improve cognitive skills due to wifes reported medical issues. 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 Patient with history of participation in team sports through college, including football. Patient has been retired for 19 years, since 2002. Patient has 3 children, reporting that his children assist with bill paying, attendance to medical appointments.     Patient was noted to be tearful throughout evaluation, reporting he is experiencing increased tearfulness \"on a whim\" reporting this has just started in the last 2 months.     Past Medical History:   Diagnosis Date   • Acute MI (HCC) 1997    cardiologist, Dr. Hernández   • Allergy    • Arthritis    • Cancer (HCC)     Skin   • High cholesterol    • Hyperlipidemia    • Hypertension    • Muscle disorder    • Personal history of venous thrombosis and embolism 2004    left arm   • Rectal abscess    • Rheumatic fever as child   • Sleep apnea     CPAP   • Unspecified cataract     surgical correction bilateral     Past Surgical History:   Procedure Laterality Date   • PB TEMPORAL ARTERY LIGATN " OR BX Right 9/26/2020    Procedure: BIOPSY, ARTERY, TEMPORAL;  Surgeon: Perry Vale M.D.;  Location: SURGERY [facility];  Service: Vascular   • SPINAL CORD STIMULATOR  7/29/2015    Procedure: SPINAL CORD STIMULATOR PERC PERM;  Surgeon: Margarito Goode M.D.;  Location: SURGERY [facility];  Service:    • PB PERCUT IMPLNT NEUROELECT,EPIDURAL  7/15/2015    Procedure: IMPLANT NEUROSTIM EPI ARRAY - SPINAL CORD STIM TRIAL [employer];  Surgeon: Margarito Goode M.D.;  Location: SURGERY [facility];  Service: Pain Management   • PB PERCUT IMPLNT NEUROELECT,EPIDURAL  7/15/2015    Procedure: IMPLANT NEUROSTIM EPI ARRAY;  Surgeon: Margarito Goode M.D.;  Location: SURGERY [facility];  Service: Pain Management   • HIP ARTHROPLASTY TOTAL Left 2/2015   • CATARACT EXTRACTION WITH IOL Bilateral 2010   • ANAL FISTULOTOMY  8/27/08    Performed by ELLY RAMOS at SURGERY SAME DAY 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 Patient with increased concern in areas of cognitive function specific to orientation, memory, attention and problem solving. Patient was noted to demonstrate improved accuracy in visual memory when compared to auditory memory.       Speech Therapy Assessment:     Expressive Language Assessment:     Ability to exhibit appropriate naming: Minimal.    Patient's ability to verbalize wants and needs is WFL.    Patient's ability to sustain dialogues within a given topic is WFL.    Patient's ability to formulate complex/abstract language is WFL.    Perseveration is Present.    Expressive language comments: SCCAN: Oral expression score 16/ 84.2%. Patient demonstrated below expected performance on given generative naming tasks to both concrete (animals naming 5 in 30 seconds) and abstract (naming 5 words beginning with /f/ in 30 seconds). Patient was noted to demonstrate a perseveration ratio of 0.20 when naming animals to concrete category, which is significant and suggestive that deficits in working memory may be negatively impacting accuracy in expressive language skills.     Written Language Assessment:       Patient ability to write full name accurately WFL.    Ability to write single words (spontaneously) WFL.     Ability to generate phrases WFL.    Ability to generate sentences Minimal.    Language comments: SCCAN: Writing score 6/ 85.7%. Patient was accurate in use of written expression to write name, single complex words. When asked to write a message presented auditorily, patient was accurate in including all important information. Patient with increased difficulty in formulation of written expression for complex sentence generation.     Receptive Language Assessment:     Personal information yes/no questions: WFL    Simple yes/no questions: WFL    Complex yes/no questions: WFL    Patient follows 1  step simple commands: WFL    Patient follows 2 step simple commands:WFL    Patient follows 1 step complex commands: WFL    Patient follows 2 step complex commands: Minimal    Patient follows 3 step complex commands: Moderate    Receptive language comments: SCCAN: Speech Comprehension score 12/ 92.3%. Patient was noted to benefit from clarification in instruction to increase understanding.     Reading Comprehension Assessment:     Patient matches written word to object: WFL    Patient ability to comprehend single words: WFL    Patient ability to comprehend short phrases: WFL    Patient ability to comprehend simple paragraphs: Severe (related to interpretation of medicine labels)    Reading comprehension comments: SCCAN: Reading Comprehension score 8/ 66.7%. Given safety related information related to medicine labels, patient demonstrated increased difficulty in determining dosage and safety related information. At this time, it is recommended patient continue with direct assistance with management of all medications to ensure safety.     Cognitive Linguistic Assessment:     Cognitive-Linguistic comments: SCCAN: Orientation score 9/ 75%. Memory score 14/ 73.7%. Attention score 10/ 62.5%. Problem Solving 18/ 78.3%.    Patient was oriented independently to self, including independence in recall of all personal information. Patient was unable to state current president or day of the week accurately. Patient demonstrated improved accuracy in memory recall to information that was presented visually versus auditorily with significant deficits in selective and divided attention demonstrated likely a contributing factor to overall deficits in memory recall at this time. Patient deficits in problem solving were related to difficulty sequencing 4 daily activities based on time constraints and difficulty accurately interpreting medicine labels.         Speech Therapy Plan :   Prognosis & Recommendations  Impression Summary:   Amarjit Khan, a 80 year old male, participated in an outpatient speech therapy evaluation of cognitive communication function in the setting of increased concern regarding recent decline in cognitive skills.    Results of formal assessment of cognitive communication function through administration of the Scales of Cognitive and Communicative Ability for Neurorehabilitation revealed patient presented with mild cognitive communication deficits. Strengths were noted in speech comprehension and oral expression with increased difficulties in areas of linguistic comprehension specific to reading comprehension for safety related information and written expression. Patient with increased concern in areas of cognitive function specific to orientation, memory, attention and problem solving. Patient was noted to demonstrate improved accuracy in visual memory when compared to auditory memory.     Given significantly difficulty in completion of evaluation tasks specific to medication management and problem solving, assistance with medication management was recommended and strongly encouraged to patient as part of evaluation review.   Prognosis:  Good  Prognosis Details:  Results of formal assessment measures of cognitive communication function suggest that patient is demonstrating deficits negatively impacting his accuracy and independence in completion of independent language related activities of daily living, resulting in increased concern regarding patient safety at this time. Direct assistance and supervision with medication management was recommended. Given deficits, it is recommended patient participate in direct, outpatient speech therapy to address areas of deficit, train compensatory strategies of memory and cognition to promote highest level of safety and independence.   Goals  Short Term Goals:  Patient will increase orientation using external visual aids, when/ as necessary, stating orientation information to person,  "place, date/ time, location independent.  Patient will improve attention completing selective and divided attention tasks with 85% or greater accuracy given min verbal prompts/ instruction/ cues as necessary.  Patient will improve working memory completing mental manipulation tasks of 4, progressing to 5 items, with 85% or greater accuracy implementing external and/or internal memory strategies as necessary provided minimal verbal prompts/ instruction/ cues.  Patient will improve receptive language skills completing 2-step, progressing to 3 and 4 step complex directions presented either written or auditorily with 92% accuracy; independent.   Patient will improve working memory completing confrontation naming, progressing to generative naming tasks to concrete categories, naming a minimum of 12 items in one-minute intervals 2/3 trials with 92% accuracy; independent.   Patient will improve thought organization, completing multi-level sequencing tasks presented through written text beginning to 4 steps, progressing to up to 6 steps with 88% accuracy; min verbal cues-independent.   Patient will demonstrate accuracy and independence in completion of language related, activities of daily living, including reading a calendar, solving daily math problems, reading instructions and medicine labels, completing tasks with a minimum of 85% accuracy; independent.     Short Term Goal Duration (Weeks):  6-8 weeks  Long Term Goals:  Patient will develop functional, cognitive-linguistic based skills and utilize compensatory strategies to demonstrate accuracy, independence and safety in completion of independent, language related activities of daily living 88% accuracy, independent.      Long Term Goal Duration (Weeks):  2-4 months  Patient Stated Goal:  \"To get back to managing my own medications. To get better because my wife is also ill.\"   Therapy Recommendations  Recommendation:  Other and Individual Speech Therapy, Consider " referral to Neuropsychology for complete cognitive assessment given it difficult to determine impact of pain on patient cognitive functioning based on today's assessment  Consider referral to Audiology for assessment of reported hearing loss  With presence of cognitive changes and presence of emotional lability, patient may benefit from referral to Neurology if cognitive decline is noted to change or worsen    Planned Therapy Interventions:  Home Program, Patient/Caregiver Education, Compensatory Strategy Training and Cognitive-Linguistic training,   Plan Details:  8 units (00194) and then reassess  Frequency:  1x week  Duration (in weeks):  8      Functional Assessment Used  Scales of Cognitive and Communicative Ability for Neurorehabilitation (SCCAN)    Referring provider co-signature:  I have reviewed this plan of care and my co-signature certifies the need for services.    Certification Period: 02/18/2021 to  04/15/21    Physician Signature: ________________________________ Date: ______________

## 2021-02-18 NOTE — TELEPHONE ENCOUNTER
Please call and inform this patient of the following:    After further review, I think it would actually be best for Amarjit to stay on the duloxetine 60 mg dose and NOT increase it to 120 mg daily. 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 If he's still struggling with his mood on the 60 mg dose, I can arrange for him to see a psychiatrist to see if there are any other options.

## 2021-02-19 NOTE — TELEPHONE ENCOUNTER
Phone Number called: [phone] (home)   Message: Left pt a mess to call back regarding Dr. Canchola's note.

## 2021-02-22 ENCOUNTER — OFFICE VISIT (OUTPATIENT)
Dept: MEDICAL GROUP | Facility: MEDICAL CENTER | Age: 81
End: 2021-02-22
Payer: MEDICARE

## 2021-02-22 VITALS
RESPIRATION RATE: 20 BRPM | SYSTOLIC BLOOD PRESSURE: 118 MMHG | WEIGHT: 233.4 LBS | DIASTOLIC BLOOD PRESSURE: 64 MMHG | TEMPERATURE: 97.5 F | HEART RATE: 68 BPM | HEIGHT: 67 IN | OXYGEN SATURATION: 93 % | BODY MASS INDEX: 36.63 KG/M2

## 2021-02-22 DIAGNOSIS — R73.9 ELEVATED BLOOD SUGAR: ICD-10-CM

## 2021-02-22 DIAGNOSIS — G89.29 OTHER CHRONIC PAIN: ICD-10-CM

## 2021-02-22 DIAGNOSIS — F32.A DEPRESSION, UNSPECIFIED DEPRESSION TYPE: ICD-10-CM

## 2021-02-22 PROCEDURE — 99214 OFFICE O/P EST MOD 30 MIN: CPT | Performed by: FAMILY MEDICINE

## 2021-02-22 ASSESSMENT — FIBROSIS 4 INDEX: FIB4 SCORE: 1.39

## 2021-02-22 NOTE — ASSESSMENT & PLAN NOTE
Follows with pain management. Notes trouble transferring, as well as managing medications. Requesting letter to this effect.

## 2021-02-22 NOTE — LETTER
2021        Re: Amarjit Khan : 1940      To whom it may concern:     Mr. Khan needs assistance transferring and his having some difficulty managing his own medicines. He is currently being seen by speech therapy and has been referred to psychiatry.     Sincerely,         Perry Canchola M.D.

## 2021-02-22 NOTE — PROGRESS NOTES
[facility]  Progress Note  Established Patient    Subjective:   Amarjit Khan is a 80 y.o. male here today to discuss fatigue.     Chronic pain  Follows with pain management. Notes trouble transferring, as well as managing medications. Requesting letter to this effect.    Depression  Persists with significant fatigue and amotivation. Not interested in increasing Cymbalta. Open to seeing psychiatrist.     Elevated blood sugar  Noted on past labs, has since stopped prednisone.      Current Outpatient Medications on File Prior to Visit   Medication Sig Dispense Refill   • XARELTO 20 MG Tab tablet Take 20 mg by mouth with dinner.     • zonisamide (ZONEGRAN) 25 MG capsule Take 25 mg by mouth every day. Take 1 capsule by mouth two times a day     • promethazine (PHENERGAN) 12.5 MG tablet Take 1 Tab by mouth 2 times a day as needed for Nausea/Vomiting. 30 Tab 0   • meloxicam (MOBIC) 15 MG tablet TAKE 1 TABLET BY MOUTH DAILY WITH FOOD AS NEEDED FOR PAIN. DO NOT TAKE MORE THAN 7 DAYS STRAIGHT 90 Tab 0   • DULoxetine (CYMBALTA) 60 MG Cap DR Particles delayed-release capsule Take 1 Cap by mouth every day. 90 Cap 0   • metoprolol SR (TOPROL XL) 25 MG TABLET SR 24 HR Take 0.5 Tabs by mouth 2 Times a Day. 180 Tab 0   • lisinopril-hydrochlorothiazide (PRINZIDE, ZESTORETIC) 20-12.5 MG per tablet Take 1 Tab by mouth every day. 30 Tab 0   • atorvastatin (LIPITOR) 80 MG tablet Take 40 mg by mouth every day.     • tramadol (ULTRAM) 50 MG Tab Take 50 mg by mouth every four hours as needed.     • HYDROmorphone (DILAUDID) 2 MG Tab Take 1 mg by mouth 2 times a day as needed for Severe Pain.     • aspirin (ASA) 81 MG Chew Tab chewable tablet Take 1 Tab by mouth every day. 100 Tab 0   • Cholecalciferol (VITAMIN D) 2000 UNIT CAPS Take 2,000 Units by mouth every day.       No current facility-administered medications on file prior to visit.          Objective:     Vitals:    02/22/21 1410   BP: 118/64   BP Location: Left arm  "  Patient Position: Sitting   BP Cuff Size: Large adult   Pulse: 68   Resp: 20   Temp: 36.4 °C (97.5 °F)   TempSrc: Temporal   SpO2: 93%   Weight: 106 kg (233 lb 6.4 oz)   Height: 1.702 m (5' 7\")       Physical Exam:  General: alert in no apparent distress.   Psych: tearful.         Assessment and Plan:     1. Elevated blood sugar  - repeat A1c in f/u.     2. Depression, unspecified depression type  I think his depression is contributing largely to his fatigue and reports of memory deficits.  - REFERRAL TO PSYCHIATRY    3. Other chronic pain  - f/u pain mgmt.        Followup: Return if symptoms worsen or fail to improve.         "

## 2021-02-22 NOTE — ASSESSMENT & PLAN NOTE
Persists with significant fatigue and amotivation. Not interested in increasing Cymbalta. Open to seeing psychiatrist.

## 2021-03-03 ENCOUNTER — IMMUNIZATION (OUTPATIENT)
Dept: FAMILY PLANNING/WOMEN'S HEALTH CLINIC | Facility: IMMUNIZATION CENTER | Age: 81
End: 2021-03-03
Attending: INTERNAL MEDICINE
Payer: MEDICARE

## 2021-03-03 DIAGNOSIS — Z23 ENCOUNTER FOR VACCINATION: Primary | ICD-10-CM

## 2021-03-03 PROCEDURE — 0002A PFIZER SARS-COV-2 VACCINE: CPT | Performed by: INTERNAL MEDICINE

## 2021-03-03 PROCEDURE — 91300 PFIZER SARS-COV-2 VACCINE: CPT | Performed by: INTERNAL MEDICINE

## 2021-03-08 ENCOUNTER — SPEECH THERAPY (OUTPATIENT)
Dept: SPEECH THERAPY | Facility: REHABILITATION | Age: 81
End: 2021-03-08
Attending: FAMILY MEDICINE
Payer: MEDICARE

## 2021-03-08 DIAGNOSIS — R46.89 COGNITIVE AND BEHAVIORAL CHANGES: ICD-10-CM

## 2021-03-08 DIAGNOSIS — R41.89 COGNITIVE AND BEHAVIORAL CHANGES: ICD-10-CM

## 2021-03-08 DIAGNOSIS — R41.841 COGNITIVE COMMUNICATION DEFICIT: ICD-10-CM

## 2021-03-08 PROCEDURE — 92507 TX SP LANG VOICE COMM INDIV: CPT

## 2021-03-08 NOTE — OP THERAPY DAILY TREATMENT
"  Outpatient Speech Therapy  DAILY TREATMENT     [facility] [address]  [address]  [place] [address]  Phone:  [phone]  Fax:  [phone]    Date: 03/08/2021    Patient: Amarjit Khan  YOB: 1940  MRN: [id number]     Time Calculation    Start time: 0935  Stop time: 1030 Time Calculation (min): 55 minutes         Chief Complaint: Poor Memory and Other (poor organization)    Visit #: 2    Subjective:   Additional Subjective Comments:      Patient returns to outpatient speech therapy reporting improvements in pain but also stated \"it depends on the day.\" Patient stated that improvement in pain can result in improvement in thinking skills, but overall he continues to experience changes in cognitive communication function, including \"I get mixed up sometimes, I don't always know what they are saying.\" Patient also stated that \"organizational skills are difficult.\"     Patient noted to be tearful during outpatient speech therapy session. Patient reported \"it just comes out.\"       Objective:   Treatments/Interventions Performed:  Cognitive-Linguistic training, Home program, Patient/Caregiver education and Compensatory strategy training  Other Treatment Interventions:  Education in use of teach back communication strategy  Treatment Intervention tool(s) used:  Attention targeted through completion of structured, visual attention trail making task drawing a continuous line to letters of the alphabet.  Complex attention continued in combination with visual attention targeted through completion of structured trail making task.   Complex attention also addressed through completion of structured, alternate naming activity to large, concrete categories: animals, first names    Objective Details:  Continuous line to letters of the alphabet completed with 24/26 = 92.3% accuracy; independent.   Complex, divided attention through trail making task: patient required direct cues to " "accurately complete beyond the first 5 alternating sequencing; representing 10/26 (38.4% accuracy) to independent level.  Alternate naming task completed requiring direct cues at the start of task, improved to minimal only verbal cues to accurately follow sequence. Moderate-direct cues necessary to address difficulties in generative naming to concrete categories.          Speech Therapy Assessment:     Receptive Language Assessment:     Receptive language comments: Use of teachback strategy educated to address patient complaints regarding difficulty in understanding of information during communication exchanges with wife, children. Use of strategy was reinforced with good results.     Cognitive Linguistic Assessment:     Patient attention sustained: Supervision Required    Patient attention divided: Moderate    Cognitive-Linguistic comments: Use of teach back strategy was reinforced through completion of structured exercise addressing deficits in attention.         Speech Therapy Plan :   Prognosis & Recommendations  Impression Summary:  Mr. Amarjit Khan, a 80 year old male, returned to outpatient speech therapy for his first follow up visit of outpatient speech therapy in the setting of cognitive communication deficits.    Patient was noted to be quite tearful at the start of outpatient speech therapy session, reporting \"tears just come out from nowhere.\" Patient was very pleasant and cooperative to all structured tasks.     Patient was provided with education in use of teachback strategy to address increased complaints regarding missing information when speaking with others. Patient with noted awareness to deficits in hearing. Use of strategy was reinforced throughout today's session to promote understanding and carryover in use.     Patient demonstrated good response to structured tasks addressing attention. Patient frequently expressed frustration regarding feelings of \"just going blank\" when completing complex " attention tasks.   Therapy Recommendations  Recommendation:  Individual Speech Therapy,  Planned Therapy Interventions:  Home Program, Patient/Caregiver Education, Compensatory Strategy Training and Cognitive-Linguistic training,   Plan Details:  Continue with structured therapy tasks addressing attention. Initiate tasks addressing thought organization/ sequencing.

## 2021-03-10 DIAGNOSIS — R73.09 ELEVATED RANDOM BLOOD GLUCOSE LEVEL: ICD-10-CM

## 2021-03-12 ENCOUNTER — TELEPHONE (OUTPATIENT)
Dept: MEDICAL GROUP | Facility: MEDICAL CENTER | Age: 81
End: 2021-03-12

## 2021-03-12 NOTE — TELEPHONE ENCOUNTER
Please call and inform this patient of the following:    I received a refill request for Mobic (meloxicam). This medicine can cause bleeding, especially in people on Xarelto, so he should limit it's use.     How frequently is he taking this medicine?

## 2021-03-12 NOTE — TELEPHONE ENCOUNTER
Spoke with pt and he's been taking it once a day for a 15 mg tab. Pt. Has NOT being on it x 2 wks since he ran out.    Dr. Canchola, please advise...

## 2021-03-15 ENCOUNTER — TELEPHONE (OUTPATIENT)
Dept: MEDICAL GROUP | Facility: MEDICAL CENTER | Age: 81
End: 2021-03-15

## 2021-03-15 RX ORDER — MELOXICAM 15 MG/1
TABLET ORAL
Qty: 90 TABLET | Refills: 1 | OUTPATIENT
Start: 2021-03-15

## 2021-03-15 RX ORDER — OMEPRAZOLE 20 MG/1
20 CAPSULE, DELAYED RELEASE ORAL DAILY
Qty: 14 CAPSULE | Refills: 0 | Status: SHIPPED | OUTPATIENT
Start: 2021-03-15 | End: 2021-03-29

## 2021-03-15 NOTE — TELEPHONE ENCOUNTER
Patient has been out of mobic for 2 weeks. Is he doing okay off this medicine? I 'd like him to stay off it if possible, because it can contribute to bleeding.     Please also inform him that I've placed a 2 week order for omeprazole to help rebuild his stomach lining.

## 2021-03-16 NOTE — TELEPHONE ENCOUNTER
Pt returned the call and I informed of Dr. Canchola's mess. Pt. Stated he's been doing okay without the mobic rx. Pt. Will take the omeprazole as directed. I asked pt to let Dr. Canchola know if any questions or concerns.  Dr. Canchola, just FROYLAN...

## 2021-03-17 ENCOUNTER — SPEECH THERAPY (OUTPATIENT)
Dept: SPEECH THERAPY | Facility: REHABILITATION | Age: 81
End: 2021-03-17
Attending: FAMILY MEDICINE
Payer: MEDICARE

## 2021-03-17 ENCOUNTER — TELEPHONE (OUTPATIENT)
Dept: MEDICAL GROUP | Facility: MEDICAL CENTER | Age: 81
End: 2021-03-17

## 2021-03-17 DIAGNOSIS — R46.89 COGNITIVE AND BEHAVIORAL CHANGES: ICD-10-CM

## 2021-03-17 DIAGNOSIS — R41.89 COGNITIVE AND BEHAVIORAL CHANGES: ICD-10-CM

## 2021-03-17 DIAGNOSIS — R41.841 COGNITIVE COMMUNICATION DEFICIT: ICD-10-CM

## 2021-03-17 PROCEDURE — 92507 TX SP LANG VOICE COMM INDIV: CPT

## 2021-03-17 ASSESSMENT — ENCOUNTER SYMPTOMS: PAIN SCALE: 6

## 2021-03-17 NOTE — TELEPHONE ENCOUNTER
Phone Number called: [phone] (home)   Message: Left pt a mess to call back regarding regarding Dr. Canchola's message.

## 2021-03-17 NOTE — TELEPHONE ENCOUNTER
Patient didn't read "[employer] message. Please relay the message below to this patient.     Amandeep Ocasio,     Your last blood sugar was elevated. I suspect this was due to the prednisone but I'd like to recheck fasting labs to be sure. I've placed the order. Please call [phone] to schedule your fasting labs.     SUYAPA

## 2021-03-17 NOTE — OP THERAPY DAILY TREATMENT
"  Outpatient Speech Therapy  DAILY TREATMENT     [facility] Speech [address]  [address]  [place] [address]  Phone:  [phone]  Fax:  [phone]    Date: 2021    Patient: Amarjit Khan  YOB: 1940  MRN: [id number]     Time Calculation    Start time: 1300  Stop time: 1355 Time Calculation (min): 55 minutes         Chief Complaint: Other (\"my thinking skills depend upon how much ache or pain I have\")    Visit #: 3    Subjective:   Progress Factors:     Progression:  Staying the same  Pain:     Current pain ratin (back and left hip)  Additional Subjective Comments:      Patient reports that his overall cognitive function can be limited by pain he is experiencing. Patient states that he can tell the kind of day he is having based on his amount of pain, for example \"If I make it to the mail box, that is going to be a good day.\" Patient endorses that sleep is also impacted by \"ache\" at this time. Patient continues to address complaints of \"ache and pain\" through Physical Therapy.     Patient reports \"I noticed about a year ago where I would lose track of my thinking, my memory was short and I thought, oh no, I'm getting old.\" Patient endorses he feels as though \"everything goes blank, it just stops\" with regards to difficulties he is experiencing in thinking skills at this time.       Objective:   Treatments/Interventions Performed:  Cognitive-Linguistic training, Home program, Patient/Caregiver education and Compensatory strategy training  Other Treatment Interventions:  Education completed in subcategorization  Treatment Intervention tool(s) used:  Attention targeted through completion of structured, generating naming task alternating naming animals, first names to letters of the alphabet  Objective Details:  Alternating attention task completed with naming to categories:  = 76.9% accuracy; independent. Attention to task: = 92.3% accuracy.   Generative " naming: fruits: patient was independent in naming 5 fruits in one-minute.   Using subcategorization strategy, patient increased accuracy in naming with improved accuracy in thought organization also demonstrated. Without time constraints, patient was accurate in naming a minimum of 20 animals, given minimal-direct prompts as necessary. Patient provided handout for structured practice.          Speech Therapy Assessment:     Cognitive Linguistic Assessment:     Patient attention selective: Minimal    Cognitive-Linguistic comments: Use of teach back strategy proved helpful in accuracy of task completion. Patient with suggested deficits in working memory, planning and thought organization as evidenced by presence of perseverative responses during completion of generative naming tasks. Patient was provided with direct instruction in use of sub categorization to increase thought organization for structured naming with continued practice in use of strategy recommended.         Speech Therapy Plan :   Prognosis & Recommendations  Impression Summary:  Patient demonstrated overall improvement in attention to structured therapy tasks initiated as part of today's session. Patient was encouraged in progress.   Therapy Recommendations  Recommendation:  Individual Speech Therapy,  Planned Therapy Interventions:  Home Program, Patient/Caregiver Education, Compensatory Strategy Training and Cognitive-Linguistic training,   Plan Details:  Continue with structured therapy tasks addressing attention. Continue with structured tasks addressing working memory, thought organization/ sequencing

## 2021-03-18 NOTE — TELEPHONE ENCOUNTER
spoke with pt and informed of Dr. Canchola's message. Pt. agreed to get his labs done.    Dr. Canchola, please note.

## 2021-03-23 ENCOUNTER — SPEECH THERAPY (OUTPATIENT)
Dept: SPEECH THERAPY | Facility: REHABILITATION | Age: 81
End: 2021-03-23
Attending: FAMILY MEDICINE
Payer: MEDICARE

## 2021-03-23 DIAGNOSIS — R41.841 COGNITIVE COMMUNICATION DEFICIT: ICD-10-CM

## 2021-03-23 DIAGNOSIS — R46.89 COGNITIVE AND BEHAVIORAL CHANGES: ICD-10-CM

## 2021-03-23 DIAGNOSIS — R41.89 COGNITIVE AND BEHAVIORAL CHANGES: ICD-10-CM

## 2021-03-23 PROCEDURE — 92507 TX SP LANG VOICE COMM INDIV: CPT

## 2021-03-23 NOTE — OP THERAPY DAILY TREATMENT
"  Outpatient Speech Therapy  DAILY TREATMENT     [facility] Speech [address]  [address]  [place] [address]  Phone:  [phone]  Fax:  [phone]    Date: 03/23/2021    Patient: Amarjit Khan  YOB: 1940  MRN: [id number]     Time Calculation    Start time: 1000  Stop time: 1045 Time Calculation (min): 45 minutes         Chief Complaint: Poor Memory    Visit #: 4    Subjective:   Additional Subjective Comments:      Patient was tearful during today's session when describing incident he experienced yesterday \"I went to the psychologist yesterday, but I couldn't find it.\" Patient stated increased frustration regarding inability to use GPS in new car he recently purchased. Session was ended early to patient request, stating he had a PT appointment at  at another location.       Objective:   Treatments/Interventions Performed:  Patient/Caregiver education, Cognitive-Linguistic training, Home program and Compensatory strategy training  Other Treatment Interventions:  Education in internal cognitive strategies to increase planning  Treatment Intervention tool(s) used:  Memory recall addressed working memory through completion of structured, mental manipulation tasks  Objective Details:  Mental manipulation: placing 3 words in order they would occur completed with 9/10 = 90% accuracy; need for repetitions 30% of the time.   Mental manipulation: math: 3 digit addition completed with 10/10 = 100% accuracy; independent. 3 digit combination of addition/ subtraction completed with 8/10 = 80% accuracy; independent.             Speech Therapy Assessment:     Cognitive Linguistic Assessment:     Patient orientation to day: [facility]    Patient orientation to month: [facility]    Patient orientation to time: [facility]    Patient orientation to self: [facility]    Patient orientation to place: [facility]    Cognitive-Linguistic comments: To address patient complaints regarding frustration he experienced related to " "event he experienced yesterday, education and discussion completed related to how to effectively plan before events.  Direct examples provided, with ST assisting patient in planning drive to appointment incorporating visualization. Patient was encouraged to complete this strategy to address deficits in attention and memory. Further education and practice in use of visualization recommended.         Speech Therapy Plan :   Prognosis & Recommendations  Impression Summary:  Patient verbalized increased awareness into deficits in attention and how they impact working memory during completion of structured, mental manipulation task stating \"there just went my attention\" requesting repetitions as necessary to improve accuracy in recall. Patient with noted improvement in working memory as demonstrated during mental manipulation tasks to the 3 word/ item level completed during today's session.   Prognosis:  Good  Prognosis Details:  Patient was encouraged in continued participation in direct, outpatient speech therapy services addressing cognitive communication deficits given noted improvements in attention and memory recall demonstrated. Patient was provided with education and rationale for outpatient speech therapy services as it relates to patient areas of deficit in attention, memory, planning/ organization and thought organization for problem solving. Patient verbalized understanding to education provided. It is expected that patient will continue to demonstrate improvement in cognitive communication skills given progress noted and overall improvement demonstrated during structured therapy tasks.   Therapy Recommendations  Recommendation:  Individual Speech Therapy,  Planned Therapy Interventions:  Home Program, Patient/Caregiver Education, Compensatory Strategy Training and Cognitive-Linguistic training,   Plan Details:  Continue with structured therapy tasks addressing attention. Continue with structured tasks " addressing working memory, thought organization/ sequencing. Continue to reinforce internal cognitive strategies as educated to improve planning.

## 2021-03-24 ENCOUNTER — SLEEP CENTER VISIT (OUTPATIENT)
Dept: SLEEP MEDICINE | Facility: MEDICAL CENTER | Age: 81
End: 2021-03-24
Payer: MEDICARE

## 2021-03-24 VITALS
BODY MASS INDEX: 34.25 KG/M2 | OXYGEN SATURATION: 94 % | HEIGHT: 68 IN | HEART RATE: 96 BPM | RESPIRATION RATE: 16 BRPM | DIASTOLIC BLOOD PRESSURE: 84 MMHG | WEIGHT: 226 LBS | SYSTOLIC BLOOD PRESSURE: 124 MMHG

## 2021-03-24 DIAGNOSIS — F51.04 CHRONIC INSOMNIA: ICD-10-CM

## 2021-03-24 DIAGNOSIS — G47.33 OSA (OBSTRUCTIVE SLEEP APNEA): ICD-10-CM

## 2021-03-24 PROCEDURE — 99204 OFFICE O/P NEW MOD 45 MIN: CPT | Performed by: FAMILY MEDICINE

## 2021-03-24 RX ORDER — LEMBOREXANT 5 MG/1
5 TABLET, FILM COATED ORAL NIGHTLY PRN
Qty: 30 TABLET | Refills: 1 | Status: SHIPPED | OUTPATIENT
Start: 2021-03-24 | End: 2021-04-07

## 2021-03-24 ASSESSMENT — FIBROSIS 4 INDEX: FIB4 SCORE: 1.39

## 2021-03-24 NOTE — PROGRESS NOTES
"  [facility]  Consult Note     Date: 3/24/2021 / Time: 3:03 PM    Patient ID:   Name:             Amarjit Khan   YOB: 1940  Age:                 80 y.o.  male   MRN:               [id number]      Thank you for requesting a sleep medicine consultation on Amarjit Khan at the sleep center. He presents today with the chief complaints of IVAN and to establish as a new pt. He was previously seen by  and Dr. Fermin. Pt was diagnosed with IVAN about 30+ years and has been on CPAP since the. His last SS was 2 years ago with Dr. Pulido's office.  The initial presenting symptoms were snoring and  excessive daytime sleepiness. He is referred by Dr. candelaria for evaluation and treatment of sleep disorder breathing and insomnia.     HISTORY OF PRESENT ILLNESS:       At night,  Amarjit Khan goes to bed around 10-11 pm on weekdays and on the weekends. He gets out of bed at 9:30-11:30 am on weekdays and on the weekends.  He  Averages about 7-8 hrs of sleep on a good night and 5-6 hrs on a bad night. Pt has bad nights about 1-2 nights per week. He falls asleep within 20 minutes. He wakes up about 3-4 times during the night due to dry mouth, bathroom use and uncontrolled pain and on average It takes him 10-20 min to fall back asleep. He has tried ambien,clonazepam,doxepin,haldol,lunesta,remeron,valium and xanax as sleep iad in past however he is allergic to all those medication. Most of them caused rash.      He is not aware of snoring/breathing pauses/gasping or choking in sleep since he has been on the CPAP.  He  denies any symptoms of restless legs syndrome such as an \"urge to move\"  He  legs in the evening or bedtime. He  denies any symptoms of narcolepsy such as sleep paralysis or cataplexy, or any symptoms to suggest parasomnias such as sleep walking or acting out of dreams. Overall,  He does not finds his sleep refreshing. In terms of  excessive daytime sleepiness,  He complains of " sleepinesswhile reading or watching TV. He does take regular naps. The naps are usually 30 min-3 hr long.      REVIEW OF SYSTEMS:       Constitutional: Denies fevers, Denies weight changes  Eyes: Denies changes in vision, no eye pain  Ears/Nose/Throat/Mouth: Denies nasal congestion or sore throat   Cardiovascular: Denies chest pain or palpitations   Respiratory: Denies shortness of breath , Denies cough  Gastrointestinal/Hepatic: Denies abdominal pain, nausea, vomiting, diarrhea, constipation or GI bleeding   Genitourinary: Denies bladder dysfunction, dysuria or frequency  Musculoskeletal/Rheum: Denies  joint pain and swelling   Skin/Breast: Denies rash  Neurological: Denies headache, confusion, memory loss or focal weakness/parasthesias  Psychiatric: denies mood disorder     Comprehensive review of systems form is reviewed with the patient and is attached in the EMR.     PMH:  has a past medical history of Acute MI ([facility]) (1997), Allergy, Arthritis, Cancer ([facility]), Chickenpox, High cholesterol, Hyperlipidemia, Hypertension, Muscle disorder, Personal history of venous thrombosis and embolism (2004), Rectal abscess, Rheumatic fever (as child), Sleep apnea, and Unspecified cataract. He also has no past medical history of Stroke ([facility]).  MEDS:   Current Outpatient Medications:   •  omeprazole (PRILOSEC) 20 MG delayed-release capsule, Take 1 capsule by mouth every day for 14 days., Disp: 14 capsule, Rfl: 0  •  XARELTO 20 MG Tab tablet, Take 20 mg by mouth with dinner., Disp: , Rfl:   •  zonisamide (ZONEGRAN) 25 MG capsule, Take 25 mg by mouth every day. Take 1 capsule by mouth two times a day, Disp: , Rfl:   •  promethazine (PHENERGAN) 12.5 MG tablet, Take 1 Tab by mouth 2 times a day as needed for Nausea/Vomiting., Disp: 30 Tab, Rfl: 0  •  meloxicam (MOBIC) 15 MG tablet, TAKE 1 TABLET BY MOUTH DAILY WITH FOOD AS NEEDED FOR PAIN. DO NOT TAKE MORE THAN 7 DAYS STRAIGHT, Disp: 90 Tab, Rfl: 0  •  DULoxetine (CYMBALTA) 60 MG Cap  DR Particles delayed-release capsule, Take 1 Cap by mouth every day., Disp: 90 Cap, Rfl: 0  •  metoprolol SR (TOPROL XL) 25 MG TABLET SR 24 HR, Take 0.5 Tabs by mouth 2 Times a Day., Disp: 180 Tab, Rfl: 0  •  lisinopril-hydrochlorothiazide (PRINZIDE, ZESTORETIC) 20-12.5 MG per tablet, Take 1 Tab by mouth every day., Disp: 30 Tab, Rfl: 0  •  atorvastatin (LIPITOR) 80 MG tablet, Take 40 mg by mouth every day., Disp: , Rfl:   •  tramadol (ULTRAM) 50 MG Tab, Take 50 mg by mouth every four hours as needed., Disp: , Rfl:   •  HYDROmorphone (DILAUDID) 2 MG Tab, Take 1 mg by mouth 2 times a day as needed for Severe Pain., Disp: , Rfl:   •  aspirin (ASA) 81 MG Chew Tab chewable tablet, Take 1 Tab by mouth every day., Disp: 100 Tab, Rfl: 0  •  Cholecalciferol (VITAMIN D) 2000 UNIT CAPS, Take 2,000 Units by mouth every day., Disp: , Rfl:   ALLERGIES:   Allergies   Allergen Reactions   • Butrans [Buprenorphine] Anaphylaxis     CYP 3A4 and 3A5 Mutation   • Codeine Anaphylaxis     CYP 3A4 and 3A5 Mutation   • Darvocet [Propoxyphene N-Apap] Anaphylaxis   • Fentanyl Anaphylaxis     CYP 3A4 and 3A5 Mutation  Per pt. Can have fentanyl but in small amounts. Per pt. Has tolerated fentanyl in past.      • Nucynta [Tapentadol] Anaphylaxis and Swelling   • Percocet [Oxycodone-Acetaminophen] Anaphylaxis   • Ambien [Zolpidem]      CYP 3A4 and 3A5 Mutation   • Biaxin [Clarithromycin]      CYP 3A4 and 3A5 Mutation   • Buspar [Buspirone]      CYP 3A4 and 3A5 Mutation   • Celexa      CYP 3A4 and 3A5 Mutation   • Citalopram    • Clindamycin    • Clonazepam      CYP 3A4 and 3A5 Mutation   • Demerol    • Diltiazem      CYP 3A4 and 3A5 Mutation   • Doxepin      CYP 3A4 and 3A5 Mutation   • Effexor [Venlafaxine]      CYP 3A4 and 3A5 Mutation   • Erythromycin      CYP 3A4 and 3A5 Mutation   • Haldol [Haloperidol]      CYP 3A4 and 3A5 Mutation   • Hydrocodone    • Lexapro      CYP 3A4 and 3A5 Mutation   • Lunesta      CYP 3A4 and 3A5 Mutation   •  "Lyrica Swelling   • Morphine Sulfate [Bupropion] Hives   • Remeron [Mirtazapine]      CYP 3A4 and 3A5 Mutation   • Serzone [Nefazodone]      CYP 3A4 and 3A5 Mutation   • Tape    • Tizanidine Hcl      Low blood pressure   • Valium      CYP 3A4 and 3A5 Mutation   • Xanax [Alprazolam]      CYP 3A4 and 3A5 Mutation   • Zonisamide    • Zyprexa      CYP 3A4 and 3A5 Mutation     SURGHX:   Past Surgical History:   Procedure Laterality Date   • PB TEMPORAL ARTERY LIGATN OR BX Right 9/26/2020    Procedure: BIOPSY, ARTERY, TEMPORAL;  Surgeon: Perry Vale M.D.;  Location: SURGERY [facility];  Service: Vascular   • SPINAL CORD STIMULATOR  7/29/2015    Procedure: SPINAL CORD STIMULATOR PERC PERM;  Surgeon: Margarito Goode M.D.;  Location: SURGERY [facility];  Service:    • PB PERCUT IMPLNT NEUROELECT,EPIDURAL  7/15/2015    Procedure: IMPLANT NEUROSTIM EPI ARRAY - SPINAL CORD STIM TRIAL Soukboard;  Surgeon: Margarito Goode M.D.;  Location: SURGERY [facility];  Service: Pain Management   • PB PERCUT IMPLNT NEUROELECT,EPIDURAL  7/15/2015    Procedure: IMPLANT NEUROSTIM EPI ARRAY;  Surgeon: Margarito Goode M.D.;  Location: SURGERY [facility];  Service: Pain Management   • HIP ARTHROPLASTY TOTAL Left 2/2015   • CATARACT EXTRACTION WITH IOL Bilateral 2010   • ANAL FISTULOTOMY  8/27/08    Performed by ELLY RAMOS at SURGERY SAME DAY [facility] ORS   • SHOULDER ARTHROTOMY Right 2006   • NERVE ULNAR TRANSFER Right 2004   • ELBOW ARTHROTOMY Left 1998   • KNEE ARTHROSCOPY Left 1995   • KNEE ARTHROTOMY Right 1992   • KNEE ARTHROTOMY Right 1990   • LUMBAR LAMINECTOMY DISKECTOMY  1988   • EYE SURGERY     • STENT PLACEMENT  2005,2010     SOCHX: .   FH:   Family History   Problem Relation Age of Onset   • Diabetes Mother    • Heart Disease Mother    • Hyperlipidemia Mother        Physical Exam:  Vitals/ General Appearance:   Weight/BMI: Body mass index is 34.36 kg/m².  Resp 16   Ht 1.727 m (5' 8\")   Wt " "103 kg (226 lb)   Vitals:    03/24/21 1500   Resp: 16   Weight: 103 kg (226 lb)   Height: 1.727 m (5' 8\")           Constitutional: Alert, no distress, well-groomed.  Skin: No rashes in visible areas.  Eye: Round. Conjunctiva clear, lids normal. No icterus.   ENMT: Lips pink without lesions, good dentition, moist mucous membranes. Phonation normal.  Neck: No masses, no thyromegaly. Moves freely without pain.  CV: Pulse as reported by patient  Respiratory: Unlabored respiratory effort, no cough or audible wheeze  Psych: Alert and oriented x3, normal affect and mood.   INVESTIGATIONS:       ASSESSMENT AND PLAN     .1. Sleep Apnea .     The pathophysiology of sleep anea and the increased risk of cardiovascular morbidity from untreated sleep apnea is discussed in detail with the patient. He  also has HTN, heart disease and Afib which can be worsened by her IVAN.     He is urged to avoid supine sleep, weight gain and alcoholic beverages since all of these can worsen sleep apnea. He is cautioned against drowsy driving. If He feels sleepy while driving, He must pull over for a break/nap, rather than persist on the road, in the interest of He own safety and that of others on the road.   Plan   - Continue BiPAP ST  20/16 cm with back up rate 13 BPM    - ordering simplus FFM with chin strap for mask leak   - F/u in 8-10 weeks to assess the efficiacy of recommended pressure    - compliance download was reviewed and discussed with the pt. We will try to obtain his last sleep study from Dr. Pulido's office   - compliance was reinforced     The evolution of psychophysiological insomnia is discussed in detail. The importance of cognitive restructuring and behavior modification therapy is emphasized for long-term improvement rather than the use of hypnotic agents, which may offer short term relief but may lead to the development of tolerance and side effects with prolonged use. Evening exercise, wind-down before bedtime, and " stimulus control (leaving the bed when unable to fall back asleep at night) are explained.    Plan   - Handouts on stimulus control and sleep hygiene are given and a couple of titles of books on insomnia are recommended, written by behavioral psychologists.    - after extensive counseling, starting on dayvigo 5 mg. Sides effects including but not limited to complex sleep behavior ( sleep walking, acting out dreams) sleep paralysis, hypnogogic hallucination and worsening of depression were explained.

## 2021-03-25 NOTE — PATIENT INSTRUCTIONS
Sleep hygiene (ref: UpToDate)  ?Sleep as long as necessary to feel rested (usually seven to eight hours for adults) and then get out of bed  ?Maintain a regular sleep schedule, particularly a regular wake-up time in the morning  ?Try not to force sleep  ?Avoid caffeinated beverages after lunch  ?Avoid alcohol near bedtime (eg, late afternoon and evening)  ?Avoid smoking or other nicotine intake, particularly during the evening  ?Adjust the bedroom environment as needed to decrease stimuli (eg, reduce ambient light, turn off the television or radio)  ?Avoid use of light-emitting screens  (laptops, tablets, smartphones, Sedimapooks) 2 hours before bedtime   ?Resolve concerns or worries before bedtime  ?Exercise regularly for at least 20 minutes, preferably more than four to five hours prior to bedtime.  ?Avoid daytime naps, especially if they are longer than 20 to 30 minutes or occur late in the day    Stimulus control (Ref: UpToDate)    Patients with insomnia may associate their bed and bedroom with the fear of not sleeping or other arousing events, rather than the more pleasurable anticipation of sleep. The longer one stays in bed trying to sleep, the stronger the association becomes. This perpetuates the difficulty falling asleep.Stimulus control therapy is a strategy whose purpose is to disrupt this association by enhancing the likelihood of sleep . Patients should not go to bed until they are sleepy and should use the bed primarily for sleep (and not for reading, watching television, eating, or worrying). They should not spend more than 20 minutes in bed awake. If they are awake after 20 minutes, they should leave the bedroom and engage in a relaxing activity, such as reading or listening to soothing music. Patients should not engage in activities that stimulate them or reward them for being awake in the middle of the night, such as eating or watching television. In addition, they should not return to bed until they  are tired and feel ready to sleep. If they return to bed and still cannot sleep within 20 minutes, the process should be repeated. An alarm should be set to wake the patient at the same time every morning, including weekends. Daytime naps are not allowed.

## 2021-03-29 ENCOUNTER — SPEECH THERAPY (OUTPATIENT)
Dept: SPEECH THERAPY | Facility: REHABILITATION | Age: 81
End: 2021-03-29
Attending: FAMILY MEDICINE
Payer: MEDICARE

## 2021-03-29 DIAGNOSIS — R46.89 COGNITIVE AND BEHAVIORAL CHANGES: ICD-10-CM

## 2021-03-29 DIAGNOSIS — R41.89 COGNITIVE AND BEHAVIORAL CHANGES: ICD-10-CM

## 2021-03-29 DIAGNOSIS — R41.841 COGNITIVE COMMUNICATION DEFICIT: ICD-10-CM

## 2021-03-29 PROCEDURE — 92507 TX SP LANG VOICE COMM INDIV: CPT

## 2021-03-29 NOTE — OP THERAPY DAILY TREATMENT
"  Outpatient Speech Therapy  DAILY TREATMENT     [facility] Speech [address]  [address]  [place] [address]  Phone:  [phone]  Fax:  [phone]    Date: 03/29/2021    Patient: Amarjit Khan  YOB: 1940  MRN: [id number]     Time Calculation    Start time: 0935  Stop time: 1030 Time Calculation (min): 55 minutes         Chief Complaint: Poor Memory    Visit #: 5    Subjective:   Additional Subjective Comments:      Patient endorses that planning routes to increase organization (for example, when driving) has helped patient increase safety \"its definitely more safe for sure.\" Patient states that he is using planning for other areas of his life as well.     Patient endorses he is now following with behavioral health, reporting completion of assessment last week.       Objective:   Treatments/Interventions Performed:  Cognitive-Linguistic training, Home program, Patient/Caregiver education and Compensatory strategy training  Other Treatment Interventions:  Education in internal memory strategies; Education in internal cognitive strategies to increase planning  Treatment Intervention tool(s) used:  Attention and working memory targeted through completion of structured sequencing task with focus on respacing words  Working memory addressed in combination with expressive language skills with patient asked to complete structured naming task to parameters of last letter of word named is the first letter of the next cue  Objective Details:  Respacing words: patient initially required direct cues and repetitive practice cues to demonstrate understanding to structured task. Patient with noted benefit from cues to follow sequence in order to accurately identify words. Scaffolded cues provided with patient completing with 7/10 = 70% accuracy; direct-faded to minimal cues.  Working memory task completed with 25/30 = 83.3% accuracy; moderate verbal cues provided in the form of " "repetitions of previously stated utterance or object named.          Speech Therapy Assessment:     Cognitive Linguistic Assessment:     Patient attention selective: Minimal    Patient orientation to day: [facility]    Patient orientation to month: [facility]    Patient orientation to time: [facility]    Patient orientation to self: [facility]    Patient orientation to place: [facility]    Patient immediate memory: Minimal (as evidenced by performance on structured, working memory task given moderate verbal prompts/ instruction/ cues)    Cognitive-Linguistic comments: Given participation in structured tasks completed during today's session addressing both attention and working memory, patient was noted to demonstrate improved self-awareness and insight into deficits currently targeted through outpatient speech therapy intervention in areas of: attention and memory. With increased awareness, patient demonstrated improved use of strategies to promote accuracy.          Speech Therapy Plan :   Prognosis & Recommendations  Impression Summary:  With participation in direct, outpatient speech therapy, patient endorses he is demonstrating improvements in planning and thought organization positively increasing safety. Patient provided the example of how he is now putting important addresses into the navigation system in his new car. Patient states that with improvements in attention and working memory, he is demonstrating less episodes of \"going blank\" which patient endorsed was \"frustrating and concerning\" at the start of outpatient speech therapy intervention.   Therapy Recommendations  Recommendation: Individual Speech Therapy,  Planned Therapy Interventions:  Home Program, Patient/Caregiver Education, Compensatory Strategy Training and Cognitive-Linguistic training,   Plan Details:  Continue with structured therapy tasks addressing attention, memory, continue with tasks addressing thought organization/ sequencing.                "

## 2021-03-30 ENCOUNTER — HOSPITAL ENCOUNTER (OUTPATIENT)
Dept: LAB | Facility: MEDICAL CENTER | Age: 81
End: 2021-03-30
Attending: FAMILY MEDICINE
Payer: MEDICARE

## 2021-03-30 ENCOUNTER — PATIENT OUTREACH (OUTPATIENT)
Dept: HEALTH INFORMATION MANAGEMENT | Facility: OTHER | Age: 81
End: 2021-03-30

## 2021-03-30 DIAGNOSIS — R73.09 ELEVATED RANDOM BLOOD GLUCOSE LEVEL: ICD-10-CM

## 2021-03-30 LAB
EST. AVERAGE GLUCOSE BLD GHB EST-MCNC: 214 MG/DL
FASTING STATUS PATIENT QL REPORTED: NORMAL
GLUCOSE SERPL-MCNC: 202 MG/DL (ref 65–99)
HBA1C MFR BLD: 9.1 % (ref 4–5.6)

## 2021-03-30 PROCEDURE — 82947 ASSAY GLUCOSE BLOOD QUANT: CPT

## 2021-03-30 PROCEDURE — 83036 HEMOGLOBIN GLYCOSYLATED A1C: CPT

## 2021-03-30 PROCEDURE — 36415 COLL VENOUS BLD VENIPUNCTURE: CPT

## 2021-03-31 ENCOUNTER — TELEPHONE (OUTPATIENT)
Dept: MEDICAL GROUP | Facility: MEDICAL CENTER | Age: 81
End: 2021-03-31

## 2021-03-31 NOTE — TELEPHONE ENCOUNTER
----- Message from Perry Canchola M.D. sent at 3/31/2021  7:47 AM PDT -----  There are some changes on labs that I'd like to discuss with patient. Recommend virtual visit.

## 2021-04-07 ENCOUNTER — OFFICE VISIT (OUTPATIENT)
Dept: MEDICAL GROUP | Facility: MEDICAL CENTER | Age: 81
End: 2021-04-07
Payer: MEDICARE

## 2021-04-07 VITALS
TEMPERATURE: 97.4 F | HEIGHT: 67 IN | RESPIRATION RATE: 20 BRPM | WEIGHT: 226.8 LBS | DIASTOLIC BLOOD PRESSURE: 60 MMHG | HEART RATE: 89 BPM | OXYGEN SATURATION: 94 % | SYSTOLIC BLOOD PRESSURE: 108 MMHG | BODY MASS INDEX: 35.6 KG/M2

## 2021-04-07 DIAGNOSIS — E11.9 TYPE 2 DIABETES MELLITUS WITHOUT COMPLICATION, WITHOUT LONG-TERM CURRENT USE OF INSULIN (HCC): ICD-10-CM

## 2021-04-07 DIAGNOSIS — G89.29 OTHER CHRONIC PAIN: ICD-10-CM

## 2021-04-07 PROBLEM — M25.559 HIP PAIN: Status: RESOLVED | Noted: 2020-11-23 | Resolved: 2021-04-07

## 2021-04-07 PROCEDURE — 99214 OFFICE O/P EST MOD 30 MIN: CPT | Performed by: FAMILY MEDICINE

## 2021-04-07 ASSESSMENT — FIBROSIS 4 INDEX: FIB4 SCORE: 1.41

## 2021-04-07 NOTE — ASSESSMENT & PLAN NOTE
Patient has chronic pain.  He is interested in seeing a physical therapist.  He is also interested in in obtaining a second opinion from a different pain management doctor.

## 2021-04-07 NOTE — PROGRESS NOTES
[facility]  Progress Note  Established Patient    Subjective:   Amarjit Khan is a 81 y.o. male here today with a chief complaint of diabetes.     Chronic pain  Patient has chronic pain.  He is interested in seeing a physical therapist.  He is also interested in in obtaining a second opinion from a different pain management doctor.    Type 2 diabetes mellitus without complication, without long-term current use of insulin (HCC)  Patient has diabetes.  He is maintained on atorvastatin, lisinopril and baby aspirin.      Current Outpatient Medications on File Prior to Visit   Medication Sig Dispense Refill   • XARELTO 20 MG Tab tablet Take 20 mg by mouth with dinner.     • DULoxetine (CYMBALTA) 60 MG Cap DR Particles delayed-release capsule Take 1 Cap by mouth every day. 90 Cap 0   • metoprolol SR (TOPROL XL) 25 MG TABLET SR 24 HR Take 0.5 Tabs by mouth 2 Times a Day. 180 Tab 0   • lisinopril-hydrochlorothiazide (PRINZIDE, ZESTORETIC) 20-12.5 MG per tablet Take 1 Tab by mouth every day. 30 Tab 0   • atorvastatin (LIPITOR) 80 MG tablet Take 40 mg by mouth every day.     • tramadol (ULTRAM) 50 MG Tab Take 50 mg by mouth every four hours as needed.     • HYDROmorphone (DILAUDID) 2 MG Tab Take 1 mg by mouth 2 times a day as needed for Severe Pain. Alternates 1 tab Q 2 day and 2 tab Q2 day     • aspirin (ASA) 81 MG Chew Tab chewable tablet Take 1 Tab by mouth every day. 100 Tab 0   • Cholecalciferol (VITAMIN D) 2000 UNIT CAPS Take 2,000 Units by mouth every day.     • zonisamide (ZONEGRAN) 25 MG capsule Take 25 mg by mouth every day. Take 1 capsule by mouth two times a day       No current facility-administered medications on file prior to visit.          Objective:     Vitals:    04/07/21 0911   BP: 108/60   BP Location: Left arm   Patient Position: Sitting   BP Cuff Size: Large adult   Pulse: 89   Resp: 20   Temp: 36.3 °C (97.4 °F)   TempSrc: Temporal   SpO2: 94%   Weight: 103 kg (226 lb 12.8 oz)   Height:  "1.702 m (5' 7\")       Physical Exam:  General: alert in no apparent distress.     Monofilament testing with a 10 gram force: sensation intact: intact bilaterally  Visual Inspection: Feet without maceration, ulcers, fissures.  Pedal pulses: intact bilaterally        Assessment and Plan:     1. Type 2 diabetes mellitus without complication, without long-term current use of insulin (HCC)  - recommended ADA diet, handout given.   - start meformin as below. Will increase intensity in f/u.   - metFORMIN (GLUCOPHAGE) 500 MG Tab; Take 1 tablet PO daily x 1 week. Then increase to 1 tablet BID after that.  Dispense: 200 tablet; Refill: 0  - Diabetic Monofilament LE Exam    2. Other chronic pain  - REFERRAL TO PHYSICAL THERAPY  - REFERRAL TO PAIN MANAGEMENT - CHRONIC OPIOID THERAPY        Followup: Return if symptoms worsen or fail to improve.         "

## 2021-04-08 ENCOUNTER — PATIENT MESSAGE (OUTPATIENT)
Dept: HEALTH INFORMATION MANAGEMENT | Facility: OTHER | Age: 81
End: 2021-04-08

## 2021-04-08 ENCOUNTER — SPEECH THERAPY (OUTPATIENT)
Dept: SPEECH THERAPY | Facility: REHABILITATION | Age: 81
End: 2021-04-08
Attending: FAMILY MEDICINE
Payer: MEDICARE

## 2021-04-08 DIAGNOSIS — R41.841 COGNITIVE COMMUNICATION DEFICIT: ICD-10-CM

## 2021-04-08 DIAGNOSIS — R46.89 COGNITIVE AND BEHAVIORAL CHANGES: ICD-10-CM

## 2021-04-08 DIAGNOSIS — R41.89 COGNITIVE AND BEHAVIORAL CHANGES: ICD-10-CM

## 2021-04-08 PROCEDURE — 92507 TX SP LANG VOICE COMM INDIV: CPT

## 2021-04-08 NOTE — OP THERAPY DAILY TREATMENT
"  Outpatient Speech Therapy  DAILY TREATMENT     [facility] Speech [address].  [address]  [place] [address]  Phone:  [phone]  Fax:  [phone]    Date: 04/08/2021    Patient: Amarjit Khan  YOB: 1940  MRN: [id number]     Time Calculation    Start time: 1030  Stop time: 1130 Time Calculation (min): 60 minutes         Chief Complaint: Poor Memory (reported as \"on and off like the weather\")    Visit #: 6    Subjective:   Additional Subjective Comments:      Patient endorses \"I am paying attention more\" noting that fatigue and levels of pain appear to be impacting factors in cognitive communication function at this time. A heating pad was offered to assist patient in his comfort during today's session given complaints of increased pain in hip/ back, \"across the small of my back.\"        Objective:   Treatments/Interventions Performed:  Patient/Caregiver education, Compensatory strategy training, Cognitive-Linguistic training and Home program  Other Treatment Interventions:  Reassessment of cognitive communication function through administration of the Scales of Cognitive and Communicative Ability for Neurorehabilitation (SCCAN)  Treatment Intervention tool(s) used:  The Scales of Cognitive and Communicative Ability for Neurorehabilitation (SCCAN) were administered to assess for patient progression in outpatient speech therapy services. Comparative results are present to provide additional support regarding progress with outpatient speech therapy services.   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 Patient score of 88 is noted in the low range of typical functioning.      SCANN scale score/ %score     Oral expression score " Please send pt his lab results per his request   19/ 100% improved from 16/ 84.2%  Orientation score 12/ 100% improved from 9/ 75%  Memory score 18/ 94.7% improved from 14/ 73.7%  Speech Comprehension score 11/ 84.6 decreased from 12/ 92.3%  Reading Comprehension score 10/ 83.3% improved from 8/ 66.7%  Writing score 7/ 100% improved from 6/ 85.7%  Attention score 13/ 81.25% improved from 10/ 62.5%  Problem Solving 18, consistent with score during initial evaluation 18/ 78.3%     Results of reassessment revealed patient demonstrated progress in areas of: oral expression, orientation, memory, reading comprehension, writing, and attention. Patient speech comprehension and problem solving scores remained consistent from previous administration. Results suggest that patient is demonstrating good progress with outpatient speech therapy suggesting that patient will continue to demonstrate improvement with continued participation in direct, skilled intervention.          Speech Therapy Assessment:     Expressive Language Assessment:     Ability to exhibit appropriate naming: WFL.    Patient's ability to use automatic language appropriately is WFL.    Patient's ability to verbalize wants and needs is WFL.    Patient's ability to sustain dialogues within a given topic is WFL.    Patient's ability to formulate complex/abstract language is WFL.    Perseveration is Absent (perseveration ratio during structured naming improved from 0.20 at the start of outpatient speech therapy to 0.00 (no perseverations) during readministration).    Expressive language comments: Oral expression score 19/ 100% improved from 16/ 84.2%.  Patient demonstrated improvement in naming to both convergent and divergent naming tasks improving accuracy in naming to concrete category of animals naming 11 items in 30 seconds, with a marked improvement in previous noted perseveration ratio: 0.20 at the start of outpatient speech therapy to 0.00 demonstrated during today's reassessment. Likewise, patient  demonstrated expected performance for generative naming to abstract category (words beginning with /f/) improving to name 6 items in 30 seconds.     Written Language Assessment:     Ability to write single words (spontaneously) WFL.     Ability to generate phrases WFL.    Ability to generate sentences WFL.    Language comments: Writing score 7/ 100% improved from 6/ 85.7%    Receptive Language Assessment:     Patient follows 2 step complex commands: Minimal    Patient follows 3 step complex commands: Moderate    Receptive language comments: Speech Comprehension score 11/ 84.6 decreased from 12/ 92.3%. Patient with continued difficulty in following multi-step complex directions provided auditorily. Patient noted deficits in hearing may be contributing factor to decrease score during today's reassessment.     Reading Comprehension Assessment:     Patient ability to comprehend single words: [facility]    Patient ability to comprehend short phrases: [facility]    Patient ability to comprehend simple notes: [facility]    Reading comprehension comments: Reading Comprehension score 10/ 83.3% improved from 8/ 66.7%. Patient with marked improvement in reading comprehension for safety related information, including medication labels, with patient reading information 2/2 = 100% accuracy to independent level, improved from 0/2 during initial evaluation.     Cognitive Linguistic Assessment:     Patient orientation to day: [facility]    Patient orientation to month: [facility]    Patient orientation to time: [facility]    Patient orientation to self: [facility]    Patient orientation to place: [facility]    Patient spontaneous clock drawing ability: Minimal    Cognitive-Linguistic comments: Orientation score 12/ 100% improved from 9/ 75%. Memory score 18/ 94.7% improved from 14/ 73.7%. Attention score 13/ 81.25% improved from 10/ 62.5%. Problem Solving 18, consistent with score during initial evaluation 18/ 78.3%. Patient demonstrated improvement in orientation demonstrating  improvement in orientation with use of external cognitive strategies initiated independent. Patient marked improvements in attention likely positively impacted improvement in memory recall with noted improvement in memory recall to newly learned information with patient demonstrating independence in recall to 6/6 details of a message provided given time delay recall with distraction, 2/3 medications. Patient problem solving remained consistent, but has not been focus of outpatient speech therapy during this initial intervention period.     Of note, patient was surprised regarding performance demonstrated in clock drawing, specific to difficulty he encountered in placing hands to denote the correct time.           Speech Therapy Plan :   Prognosis & Recommendations  Impression Summary:  Mr. Amarjit Khan, a 81 year old male, participated in 6 sessions of direct, outpatient speech therapy addressing cognitive communication function in the setting of increased concern regarding recent decline in cognitive skills.    Results of standardized assessment measures through re-administration of the Scales of Cognitive and Communicative Ability in Neurorehabilitation revealed patient demonstrated an overall marked improvement in cognitive communication function with participation in direct, outpatient speech therapy services improving from mild cognitive communication deficits upon initial evaluation to scores consistent with typical functioning.     Improvements were demonstrated in all areas targeted during outpatient speech therapy with focus during the initial treatment period on attention and memory. It is expected that patient will demonstrate further improvement in thought organization and problem solving with direct focus of intervention.     Therapy Recommendations  Recommendation:  Individual Speech Therapy and Other, Consider referral to Audiology for formal hearing assessment   Planned Therapy Interventions:  Home  Program, Patient/Caregiver Education, Compensatory Strategy Training and Cognitive-Linguistic training,   Plan Details:  Continue with direct, outpatient speech therapy services addressing cognitive communication deficits

## 2021-04-08 NOTE — OP THERAPY PROGRESS SUMMARY
"  Outpatient Speech Therapy  PROGRESS SUMMARY NOTE      [facility] Speech [address]  [address]  [place] [address]  Phone:  [phone]  Fax:  [phone]    Date of Visit: 04/08/2021    Patient: Amarjit Khan  YOB: 1940  MRN: [id number]     Referring Provider: Perry Canchola M.D.  [address] [facility]  [address]  [place] [address]   Referring Diagnosis Cognitive and behavioral changes [R41.89, R46.89]      Visit #: 6    Progress Report Period: 2/18/2021-4/8/2021    Time Calculation    Start time: 1030  Stop time: 1130 Time Calculation (min): 60 minutes         Chief Complaint: Poor Memory (reported as \"on and off like the weather\")    Visit Diagnoses     ICD-10-CM   1. Cognitive communication deficit  R41.841   2. Cognitive and behavioral changes  R41.89    R46.89       Subjective:   Reason for Therapy:     Reason For Evaluation:  Cognition    Onset Date:  2/18/2018    Onset Description:  Patient is a 80 year old male, referred to outpatient speech therapy in the setting of increased concern regarding cognitive decline. Deficits in cognitive function were reported by patient as difficulty in management of medications and memory. Patient also with concerns regarding increased presence of falls and reduced ability to complete activities of daily living.      Objective:   Treatments/Interventions Performed:  Cognitive-Linguistic training, Home program, Patient/Caregiver education and Compensatory strategy training  Other Treatment Interventions:  Reassessment of cognitive communication function through administration of the Scales of Cognitive and Communicative Ability for Neurorehabilitation (SCCAN)  Treatment Intervention tool(s) used:  The Scales of Cognitive and Communicative Ability for Neurorehabilitation (SCCAN) were administered to assess for patient progression in outpatient speech therapy services. Comparative results are present to provide additional support " regarding progress with outpatient speech therapy services.   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 Patient score of 88 is noted in the low range of typical functioning.      SCANN scale score/ %score     Oral expression score 19/ 100% improved from 16/ 84.2%  Orientation score 12/ 100% improved from 9/ 75%  Memory score 18/ 94.7% improved from 14/ 73.7%  Speech Comprehension score 11/ 84.6 decreased from 12/ 92.3%  Reading Comprehension score 10/ 83.3% improved from 8/ 66.7%  Writing score 7/ 100% improved from 6/ 85.7%  Attention score 13/ 81.25% improved from 10/ 62.5%  Problem Solving 18, consistent with score during initial evaluation 18/ 78.3%     Results of reassessment revealed patient demonstrated progress in areas of: oral expression, orientation, memory, reading comprehension, writing, and attention. Patient speech comprehension and problem solving scores remained consistent from previous administration. Results suggest that patient is demonstrating good progress with outpatient speech therapy suggesting that patient will continue to demonstrate improvement with continued participation in direct, skilled intervention.          Speech Therapy Assessment:     Expressive Language Assessment:     Ability to exhibit appropriate naming: WFL.    Patient's ability to verbalize wants and needs is WFL.    Patient's ability to sustain dialogues within a given topic is WFL.    Patient's ability to formulate complex/abstract language is WFL.    Perseveration is Absent (perseveration ratio during structured naming improved from 0.20 at the start of outpatient speech therapy to 0.00 (no perseverations) during readministration).    Expressive language comments: Oral  expression score 19/ 100% improved from 16/ 84.2%.  Patient demonstrated improvement in naming to both convergent and divergent naming tasks improving accuracy in naming to concrete category of animals naming 11 items in 30 seconds, with a marked improvement in previous noted perseveration ratio: 0.20 at the start of outpatient speech therapy to 0.00 demonstrated during today's reassessment. Likewise, patient demonstrated expected performance for generative naming to abstract category (words beginning with /f/) improving to name 6 items in 30 seconds.     Written Language Assessment:     Ability to write single words (spontaneously) WFL.     Ability to generate phrases WFL.    Ability to generate sentences WFL.    Language comments: Writing score 7/ 100% improved from 6/ 85.7%    Receptive Language Assessment:     Patient follows 2 step complex commands: Minimal    Patient follows 3 step complex commands: Moderate    Receptive language comments: Speech Comprehension score 11/ 84.6 decreased from 12/ 92.3%. Patient with continued difficulty in following multi-step complex directions provided auditorily. Patient noted deficits in hearing may be contributing factor to decrease score during today's reassessment.     Reading Comprehension Assessment:     Patient ability to comprehend single words: [facility]    Patient ability to comprehend short phrases: [facility]    Patient ability to comprehend simple notes: [facility]    Reading comprehension comments:  Reading Comprehension score 10/ 83.3% improved from 8/ 66.7%. Patient with marked improvement in reading comprehension for safety related information, including medication labels, with patient reading information 2/2 = 100% accuracy to independent level, improved from 0/2 during initial evaluation.     Cognitive Linguistic Assessment:     Patient orientation to day: [facility]    Patient orientation to month: [facility]    Patient orientation to time: [facility]    Patient orientation to self: [facility]    Patient  orientation to place: [facility]    Patient immediate memory: Supervision Required    Patient recent and short term memory: Supervision Required    Patient prospective and time delay memory: Minimal    Patient biographical information memory: WFL    Patient ability to organize thoughts: Minimal    Patient simple reasoning ability: WFL    Patient complex reasoning ability: Minimal    Patient simple problem solving ability: WFL    Patient complex problem solving ability: Minimal    Patient spontaneous clock drawing ability: Minimal    Cognitive-Linguistic comments: Orientation score 12/ 100% improved from 9/ 75%. Memory score 18/ 94.7% improved from 14/ 73.7%. Attention score 13/ 81.25% improved from 10/ 62.5%. Problem Solving 18, consistent with score during initial evaluation 18/ 78.3%. Patient demonstrated improvement in orientation demonstrating improvement in orientation with use of external cognitive strategies initiated independent. Patient marked improvements in attention likely positively impacted improvement in memory recall with noted improvement in memory recall to newly learned information with patient demonstrating independence in recall to 6/6 details of a message provided given time delay recall with distraction, 2/3 medications. Patient problem solving remained consistent, but has not been focus of outpatient speech therapy during this initial intervention period.      Of note, patient was surprised regarding performance demonstrated in clock drawing, specific to difficulty he encountered in placing hands to denote the correct time.            Speech Therapy Plan :   Prognosis & Recommendations  Impression Summary:  Mr. Amarjit Khan, a 81 year old male, participated in 6 sessions of direct, outpatient speech therapy addressing cognitive communication function in the setting of increased concern regarding recent decline in cognitive skills.     Results of standardized assessment measures through  re-administration of the Scales of Cognitive and Communicative Ability in Neurorehabilitation revealed patient demonstrated an overall marked improvement in cognitive communication function with participation in direct, outpatient speech therapy services improving from mild cognitive communication deficits upon initial evaluation to scores consistent with typical functioning.      Improvements were demonstrated in all areas targeted during outpatient speech therapy with focus during the initial treatment period on attention and memory. It is expected that patient will demonstrate further improvement in thought organization and problem solving with direct focus of intervention.      Prognosis:  Good  Prognosis Details:  Results of reassessment revealed patient demonstrated progress in areas of: oral expression, orientation, memory, reading comprehension, writing, and attention. Patient speech comprehension and problem solving scores remained consistent from previous administration. Results suggest that patient is demonstrating good progress with outpatient speech therapy suggesting that patient will continue to demonstrate improvement with continued participation in direct, skilled intervention.   Goals  Short Term Goals:  Patient will improve attention completing selective and divided attention tasks with 85% or greater accuracy given min verbal prompts/ instruction/ cues as necessary.   Patient will improve working memory completing mental manipulation tasks of 4, progressing to 5 items, with 85% or greater accuracy implementing external and/or internal memory strategies as necessary provided minimal verbal prompts/ instruction/ cues.   Patient will improve receptive language skills completing 2-step, progressing to 3 and 4 step complex directions presented either written or auditorily with 92% accuracy; independent.   Patient will improve working memory completing confrontation naming, progressing to generative  naming tasks to concrete categories, naming a minimum of 12 items in one-minute intervals 2/3 trials with 92% accuracy; independent.   Patient will improve thought organization, completing multi-level sequencing tasks presented through written text beginning to 4 steps, progressing to up to 6 steps with 88% accuracy; min verbal cues-independent.  Patient will demonstrate accuracy and independence in completion of language related, activities of daily living, including reading a calendar, solving daily math problems, reading instructions and medicine labels, completing tasks with a minimum of 85% accuracy; independent.   Short Term Goal Duration (Weeks):  6-8 weeks  Patient progression on Short Term Goals:   Patient will increase orientation using external visual aids, when/ as necessary, stating orientation information to person, place, date/ time, location independent: GOAL MET 4/8/2021.   Patient will improve attention completing selective and divided attention tasks with 85% or greater accuracy given min verbal prompts/ instruction/ cues as necessary: PROGRESSING/ CONTINUE.   Patient will improve working memory completing mental manipulation tasks of 4, progressing to 5 items, with 85% or greater accuracy implementing external and/or internal memory strategies as necessary provided minimal verbal prompts/ instruction/ cues: PROGRESSING/ CONTINUE.   Patient will improve receptive language skills completing 2-step, progressing to 3 and 4 step complex directions presented either written or auditorily with 92% accuracy; independent: PROGRESSING/ CONTINUE.   Patient will improve working memory completing confrontation naming, progressing to generative naming tasks to concrete categories, naming a minimum of 12 items in one-minute intervals 2/3 trials with 92% accuracy; independent: PROGRESS/ CONTINUE.   Patient will improve thought organization, completing multi-level sequencing tasks presented through written text  beginning to 4 steps, progressing to up to 6 steps with 88% accuracy; min verbal cues-independent: CONTINUE.  Patient will demonstrate accuracy and independence in completion of language related, activities of daily living, including reading a calendar, solving daily math problems, reading instructions and medicine labels, completing tasks with a minimum of 85% accuracy; independent: PROGRESS/ CONTINUE.   Long Term Goals:  Patient will develop functional, cognitive-linguistic based skills and utilize compensatory strategies to demonstrate accuracy, independence and safety in completion of independent, language related activities of daily living 88% accuracy, independent.  Long Term Goal Duration (Weeks):  2-4 months  Therapy Recommendations  Recommendation:  Individual Speech Therapy and Other, Consider referral to Audiology for formal hearing assessment   Planned Therapy Interventions:  Home Program, Patient/Caregiver Education, Compensatory Strategy Training and Cognitive-Linguistic training,   Plan Details:  8 units (45370)  Frequency:  1x week  Duration (in weeks):  8      Functional Assessment Used  Scales of Cognitive and Communicative Ability in Neurorehabilitation (SCCAN)    Referring provider co-signature:  I have reviewed this plan of care and my co-signature certifies the need for services.    Certification Period: 04/08/2021 to 06/03/21    Physician Signature: ________________________________ Date: ______________

## 2021-04-29 ENCOUNTER — SPEECH THERAPY (OUTPATIENT)
Dept: SPEECH THERAPY | Facility: REHABILITATION | Age: 81
End: 2021-04-29
Attending: FAMILY MEDICINE
Payer: MEDICARE

## 2021-04-29 DIAGNOSIS — R41.89 COGNITIVE AND BEHAVIORAL CHANGES: ICD-10-CM

## 2021-04-29 DIAGNOSIS — R46.89 COGNITIVE AND BEHAVIORAL CHANGES: ICD-10-CM

## 2021-04-29 DIAGNOSIS — R41.841 COGNITIVE COMMUNICATION DEFICIT: ICD-10-CM

## 2021-04-29 PROCEDURE — 92507 TX SP LANG VOICE COMM INDIV: CPT

## 2021-04-29 NOTE — OP THERAPY DISCHARGE SUMMARY
"Outpatient Speech Language Pathology  DISCHARGE SUMMARY NOTE      [facility] Speech Language Pathology [address]  [address]  [place] [address]  Phone:  [phone]  Fax:  [phone]        Patient Name:  Amarjit Khan  :  1940  MR#:  [id number]    HICN:       Visits: 7        Cancel/No-Show:     Diagnosis/ICD-10:   1. Cognitive communication deficit  R41.841   2. Cognitive and behavioral changes  R41.89     R46.89         Referring Provider: Perry Canchola M.D.    SOC Date:   2021 Onset Date: 2018      Your patient is being discharged from Speech therapy with the following comments:  Goals Partially Met      Comments:Mr. Amarjit Khan, a 81 year old male, returned to outpatient speech therapy in the setting of recent decline in cognitive skills. Progress note was completed 2021. Patient returns this day stating that he and his wife will be traveling out the state to stay with family due to unexpected diagnosis of family member. Patient wishes to discharge from outpatient speech therapy services at this time, as patient endorses they expect to stay awhile and do not expect to return \"until about the end of July.\"         Limitations/Remaining:Please refer to speech therapy progress note dated 2021.         Recommendations:D/C outpatient speech therapy at patient request. Patient is welcome to return to outpatient speech therapy to address cognitive communication function pending new physician referral. Patient was encouraged in continuation of tasks addressing cognitive communication function daily, as outlined through participation in outpatient speech therapy services, to promote cognitive health and maintain cognitive communication function to current level.         April Barkley, SLP      "

## 2021-04-29 NOTE — OP THERAPY DAILY TREATMENT
"  Outpatient Speech Therapy  DAILY TREATMENT     [facility] Speech [address]  [address]  [place] [address]  Phone:  [phone]  Fax:  [phone]    Date: 04/29/2021    Patient: Amarjit Khan  YOB: 1940  MRN: [id number]     Time Calculation    Start time: 1500  Stop time: 1555 Time Calculation (min): 55 minutes         Chief Complaint: No chief complaint on file.    Visit #: 7    Subjective:   Additional Subjective Comments:      \"The organizational skills are better.\" Patient endorses that the pain \"comes and goes\" stating he is now feeling some relief which has positively impacted his cognitive function. Patient endorses he \"gets his hearing fixed in 2 weeks\". Patient endorses as part of home program he \"has been trying to do the puzzles in the paper.\"       Objective:   Treatments/Interventions Performed:  Patient/Caregiver education, Home program, Cognitive-Linguistic training and Compensatory strategy training  Other Treatment Interventions:  Speech therapy targeted cognitive communication function through completion of structured activities addressing attention, memory recall  Objective Details:  Attention, working memory addressed through completion of structured trail making task completed with 19/26 = 73% accuracy; independent.   Thought organization addressed through completion of structured, sequencing tasks to 5 and 6 step levels. Patient was accurate in sequencing 5 steps with 4/4 = 100% accuracy; independent. 6 steps 2/4 = 50% accuracy; independent; improved to 100% accuracy when implementing compensatory cognitive strategies.   Generative naming to concrete category: patient was accurate in naming 9 items in one-minute.            Speech Therapy Assessment:     Cognitive Linguistic Assessment:     Patient attention selective: Minimal    Patient five step sequencing ability: WFL    Cognitive-Linguistic comments: Patient with improved activity tolerance " "during completion of structured cognitive tasks. Patient with noted improvements in thought organization, as evidenced by performance on multi-level sequencing task to 5 steps. 6 steps required education in use of external cognitive strategies to support accuracy.         Speech Therapy Plan :   Prognosis & Recommendations  Impression Summary:  Mr. Amarjit Khan, a 81 year old male, returned to outpatient speech therapy in the setting of recent decline in cognitive skills. Progress note was completed 4/8/2021. Patient returns this day stating that he and his wife will be traveling out the state to stay with family due to unexpected diagnosis of family member. Patient wishes to discharge from outpatient speech therapy services at this time, as patient endorses they expect to stay awhile and do not expect to return \"until about the end of July.\"   Therapy Recommendations  Recommendation:  No further speech therapy indicated at this time,  Plan Details:  D/C outpatient speech therapy at patient request. Patient is welcome to return to outpatient speech therapy to address cognitive communication function pending new physician referral. Patient was encouraged in continuation of tasks addressing cognitive communication function daily, as outlined through participation in outpatient speech therapy services, to promote cognitive health and maintain cognitive communication function to current level.                "

## 2021-05-03 ENCOUNTER — OFFICE VISIT (OUTPATIENT)
Dept: MEDICAL GROUP | Facility: MEDICAL CENTER | Age: 81
End: 2021-05-03
Payer: MEDICARE

## 2021-05-03 VITALS
OXYGEN SATURATION: 95 % | SYSTOLIC BLOOD PRESSURE: 108 MMHG | HEART RATE: 89 BPM | RESPIRATION RATE: 18 BRPM | BODY MASS INDEX: 34.88 KG/M2 | TEMPERATURE: 96.9 F | DIASTOLIC BLOOD PRESSURE: 60 MMHG | HEIGHT: 67 IN | WEIGHT: 222.2 LBS

## 2021-05-03 DIAGNOSIS — R53.83 OTHER FATIGUE: ICD-10-CM

## 2021-05-03 DIAGNOSIS — G89.29 OTHER CHRONIC PAIN: ICD-10-CM

## 2021-05-03 DIAGNOSIS — E11.9 TYPE 2 DIABETES MELLITUS WITHOUT COMPLICATION, WITHOUT LONG-TERM CURRENT USE OF INSULIN (HCC): ICD-10-CM

## 2021-05-03 LAB — GLUCOSE BLD-MCNC: 147 MG/DL (ref 70–100)

## 2021-05-03 PROCEDURE — 99214 OFFICE O/P EST MOD 30 MIN: CPT | Performed by: FAMILY MEDICINE

## 2021-05-03 PROCEDURE — 82962 GLUCOSE BLOOD TEST: CPT | Performed by: FAMILY MEDICINE

## 2021-05-03 ASSESSMENT — FIBROSIS 4 INDEX: FIB4 SCORE: 1.41

## 2021-05-03 NOTE — PROGRESS NOTES
[facility]  Progress Note  Established Patient    Subjective:   Amarjit Khan is a 81 y.o. male here today with a chief complaint of fatigue.     Chronic pain  Patient would be interested in physical therapy for this issue.    Fatigue  Patient continues to describe some significant fatigue.  He is maintained on CPAP for his obstructive sleep apnea.  Last TSH and hemoglobin were at goal.  Patient does have depression treated with Cymbalta. He has declined psychiatry consult.  He was recently diagnosed with atrial fibrillation and is currently on a rate control strategy.    Type 2 diabetes mellitus without complication, without long-term current use of insulin ([facility])  Doing well on the Metformin.  Patient is also on atorvastatin, lisinopril and baby aspirin.      Current Outpatient Medications on File Prior to Visit   Medication Sig Dispense Refill   • XARELTO 20 MG Tab tablet Take 20 mg by mouth with dinner.     • DULoxetine (CYMBALTA) 60 MG Cap DR Particles delayed-release capsule Take 1 Cap by mouth every day. 90 Cap 0   • metoprolol SR (TOPROL XL) 25 MG TABLET SR 24 HR Take 0.5 Tabs by mouth 2 Times a Day. 180 Tab 0   • lisinopril-hydrochlorothiazide (PRINZIDE, ZESTORETIC) 20-12.5 MG per tablet Take 1 Tab by mouth every day. 30 Tab 0   • atorvastatin (LIPITOR) 80 MG tablet Take 40 mg by mouth every day.     • tramadol (ULTRAM) 50 MG Tab Take 50 mg by mouth every four hours as needed.     • HYDROmorphone (DILAUDID) 2 MG Tab Take 1 mg by mouth 2 times a day as needed for Severe Pain. Alternates 1 tab Q 2 day and 2 tab Q2 day     • aspirin (ASA) 81 MG Chew Tab chewable tablet Take 1 Tab by mouth every day. 100 Tab 0   • Cholecalciferol (VITAMIN D) 2000 UNIT CAPS Take 2,000 Units by mouth every day.     • zonisamide (ZONEGRAN) 25 MG capsule Take 25 mg by mouth every day. Take 1 capsule by mouth two times a day       No current facility-administered medications on file prior to visit.          Objective:  "    Vitals:    05/03/21 1420   BP: 108/60   BP Location: Left arm   Patient Position: Sitting   BP Cuff Size: Adult long   Pulse: 89   Resp: 18   Temp: 36.1 °C (96.9 °F)   TempSrc: Temporal   SpO2: 95%   Weight: 101 kg (222 lb 3.2 oz)   Height: 1.702 m (5' 7\")       Physical Exam:  General: alert in no apparent distress.         Assessment and Plan:     1. Type 2 diabetes mellitus without complication, without long-term current use of insulin ([facility])  In the hopes of decreasing weight, improving pain, improving sleep and hopefully improving fatigue along with reducing cardiac risk and controlling blood sugars, I will stop Metformin and start empagliflozin.  - POCT Glucose  - Empagliflozin 10 MG Tab; Take 1 tablet by mouth every day.  Dispense: 90 tablet; Refill: 0    2. Other chronic pain  - REFERRAL TO PHYSICAL THERAPY    3. Other fatigue  - see above.         Followup: Return in about 3 months (around 8/3/2021), or if symptoms worsen or fail to improve, for DM.         "

## 2021-05-03 NOTE — ASSESSMENT & PLAN NOTE
Patient continues to describe some significant fatigue.  He is maintained on CPAP for his obstructive sleep apnea.  Last TSH and hemoglobin were at goal.  Patient does have depression treated with Cymbalta. He has declined psychiatry consult.  He was recently diagnosed with atrial fibrillation and is currently on a rate control strategy.

## 2021-05-04 ENCOUNTER — TELEPHONE (OUTPATIENT)
Dept: MEDICAL GROUP | Facility: MEDICAL CENTER | Age: 81
End: 2021-05-04

## 2021-05-04 NOTE — TELEPHONE ENCOUNTER
E[facility] from [facility] (Phone: [phone], fax: [phone]) left a mess stating that she received a letter asking for them to provider services for pt.  They need:  1) referral  2) pertinent information.  Dr. Canchola, I pended the order please sign if ok or advise...

## 2021-05-05 ENCOUNTER — APPOINTMENT (OUTPATIENT)
Dept: SPEECH THERAPY | Facility: REHABILITATION | Age: 81
End: 2021-05-05
Attending: FAMILY MEDICINE
Payer: MEDICARE

## 2021-05-05 NOTE — TELEPHONE ENCOUNTER
Although I believe the patient would benefit from PT I don't believe he's homebound so I don't believe he'd qualify for home health.

## 2021-05-05 NOTE — TELEPHONE ENCOUNTER
TONNY with Eri at [facility] informing of Dr. Canchola's response. I asked that she calls if further questions.

## 2021-05-06 ENCOUNTER — OFFICE VISIT (OUTPATIENT)
Dept: SLEEP MEDICINE | Facility: MEDICAL CENTER | Age: 81
End: 2021-05-06
Payer: MEDICARE

## 2021-05-06 VITALS
DIASTOLIC BLOOD PRESSURE: 72 MMHG | SYSTOLIC BLOOD PRESSURE: 122 MMHG | WEIGHT: 223 LBS | OXYGEN SATURATION: 94 % | HEIGHT: 68 IN | RESPIRATION RATE: 16 BRPM | HEART RATE: 81 BPM | BODY MASS INDEX: 33.8 KG/M2

## 2021-05-06 DIAGNOSIS — G47.33 OSA (OBSTRUCTIVE SLEEP APNEA): ICD-10-CM

## 2021-05-06 DIAGNOSIS — G47.10 HYPERSOMNIA: ICD-10-CM

## 2021-05-06 PROCEDURE — 99214 OFFICE O/P EST MOD 30 MIN: CPT | Performed by: FAMILY MEDICINE

## 2021-05-06 RX ORDER — DEXTROAMPHETAMINE SACCHARATE, AMPHETAMINE ASPARTATE, DEXTROAMPHETAMINE SULFATE AND AMPHETAMINE SULFATE 1.25; 1.25; 1.25; 1.25 MG/1; MG/1; MG/1; MG/1
5 TABLET ORAL DAILY
Qty: 30 TABLET | Refills: 0 | Status: SHIPPED | OUTPATIENT
Start: 2021-05-06 | End: 2021-05-20

## 2021-05-06 ASSESSMENT — FIBROSIS 4 INDEX: FIB4 SCORE: 1.41

## 2021-05-06 NOTE — PROGRESS NOTES
[facility] Follow Up Note     Date: 5/6/2021 / Time: 3:19 PM    Patient ID:   Name:             Amarjit Khan   YOB: 1940  Age:                 81 y.o.  male   MRN:               [id number]      Thank you for requesting a sleep medicine consultation on Amarjit Khan at the sleep center. He presents today with the chief complaints of IVAN follow up.  He was previously seen by  and Dr. Fermin. Pt was diagnosed with IVAN about 30+ years and has been on CPAP since the. His last SS was 2 years ago with Dr. Pulido's office.      HISTORY OF PRESENT ILLNESS:       Pt is currently on BiPAP ST  20/16 cm with back up rate 13 BPM . He goes to sleep around  10-11 pm on weekdays and on the weekends. He gets out of bed at 9-11 am on weekdays and on the weekends.  He  Averages about 7-8 hrs of sleep on a good night and 5-6 hrs on a bad night. The bad nights are about couple per week. He wakes up every 2 hrs or so due to pain, mask leak and dry mouth.  Overall,he doesnot finds his sleep refreshing. He has tried ambien,clonazepam,doxepin,haldol,lunesta,remeron,valium and xanax as sleep iad in past however he is allergic to all those medication.He does take regular naps. The naps are usually 30-3hr min long.He denies any symptoms of RLS, narcolepsy or any symptoms to suggest parasomnias such as nightmares, sleep walking or acting out of dreams.      He is using BiPAP ST most days of the week. Pt reports 7 hrs of average nightly use of BiPAP ST. Pt denies snoring, gasping,choking.Pt also denies significant mask leak that is interfering with sleep. The 30 day compliance was downloaded which shows adequate compliance with more that 4 hr usage about 97%. The AHI is has improved to 6.3/hr. The mask leak is normal The symptoms of sleep apnea is well controlled on the therapy except excessive daytime sleepiness.  It is likely multifactorial due to his chronic condition and medications.  On his last  visit dayvigo was prescribed however the pharmacist recommended against due to his history of CY gene mutation      REVIEW OF SYSTEMS:       Constitutional: Denies fevers, Denies weight changes  Eyes: Denies changes in vision, no eye pain  Ears/Nose/Throat/Mouth: Denies nasal congestion or sore throat   Cardiovascular: Denies chest pain or palpitations   Respiratory: Denies shortness of breath , Denies cough  Gastrointestinal/Hepatic: Denies abdominal pain, nausea, vomiting, diarrhea, constipation or GI bleeding   Genitourinary: Deniesdysuria or frequency  Musculoskeletal/Rheum: Denies  joint pain and swelling   Skin/Breast: Denies rash,   Neurological: Denies headache, confusion, memory loss or focal weakness/parasthesias  Psychiatric: denies mood disorder   Sleep: + EDS     Comprehensive review of systems form is reviewed with the patient and is attached in the EMR.     PMH:  has a past medical history of Acute MI ([facility]) (), Allergy, Arthritis, Cancer ([facility]), Chickenpox, High cholesterol, Hyperlipidemia, Hypertension, Muscle disorder, Personal history of venous thrombosis and embolism (), Rectal abscess, Rheumatic fever (as child), Sleep apnea, and Unspecified cataract. He also has no past medical history of Stroke ([facility]).  MEDS:   Current Outpatient Medications:   •  XARELTO 20 MG Tab tablet, Take 20 mg by mouth with dinner., Disp: , Rfl:   •  DULoxetine (CYMBALTA) 60 MG Cap DR Particles delayed-release capsule, Take 1 Cap by mouth every day., Disp: 90 Cap, Rfl: 0  •  metoprolol SR (TOPROL XL) 25 MG TABLET SR 24 HR, Take 0.5 Tabs by mouth 2 Times a Day., Disp: 180 Tab, Rfl: 0  •  lisinopril-hydrochlorothiazide (PRINZIDE, ZESTORETIC) 20-12.5 MG per tablet, Take 1 Tab by mouth every day., Disp: 30 Tab, Rfl: 0  •  atorvastatin (LIPITOR) 80 MG tablet, Take 40 mg by mouth every day., Disp: , Rfl:   •  tramadol (ULTRAM) 50 MG Tab, Take 50 mg by mouth every four hours as needed., Disp: , Rfl:   •  HYDROmorphone  (DILAUDID) 2 MG Tab, Take 1 mg by mouth 2 times a day as needed for Severe Pain. Alternates 1 tab Q 2 day and 2 tab Q2 day, Disp: , Rfl:   •  aspirin (ASA) 81 MG Chew Tab chewable tablet, Take 1 Tab by mouth every day., Disp: 100 Tab, Rfl: 0  •  Cholecalciferol (VITAMIN D) 2000 UNIT CAPS, Take 2,000 Units by mouth every day., Disp: , Rfl:   •  Empagliflozin 10 MG Tab, Take 1 tablet by mouth every day., Disp: 90 tablet, Rfl: 0  •  zonisamide (ZONEGRAN) 25 MG capsule, Take 25 mg by mouth every day. Take 1 capsule by mouth two times a day, Disp: , Rfl:   ALLERGIES:   Allergies   Allergen Reactions   • Butrans [Buprenorphine] Anaphylaxis     CYP 3A4 and 3A5 Mutation   • Codeine Anaphylaxis     CYP 3A4 and 3A5 Mutation   • Darvocet [Propoxyphene N-Apap] Anaphylaxis   • Fentanyl Anaphylaxis     CYP 3A4 and 3A5 Mutation  Per pt. Can have fentanyl but in small amounts. Per pt. Has tolerated fentanyl in past.      • Nucynta [Tapentadol] Anaphylaxis and Swelling   • Percocet [Oxycodone-Acetaminophen] Anaphylaxis   • Ambien [Zolpidem]      CYP 3A4 and 3A5 Mutation   • Biaxin [Clarithromycin]      CYP 3A4 and 3A5 Mutation   • Buspar [Buspirone]      CYP 3A4 and 3A5 Mutation   • Celexa      CYP 3A4 and 3A5 Mutation   • Citalopram    • Clindamycin    • Clonazepam      CYP 3A4 and 3A5 Mutation   • Demerol    • Diltiazem      CYP 3A4 and 3A5 Mutation   • Doxepin      CYP 3A4 and 3A5 Mutation   • Effexor [Venlafaxine]      CYP 3A4 and 3A5 Mutation   • Erythromycin      CYP 3A4 and 3A5 Mutation   • Haldol [Haloperidol]      CYP 3A4 and 3A5 Mutation   • Hydrocodone    • Lexapro      CYP 3A4 and 3A5 Mutation   • Lunesta      CYP 3A4 and 3A5 Mutation   • Lyrica Swelling   • Morphine Sulfate [Bupropion] Hives   • Remeron [Mirtazapine]      CYP 3A4 and 3A5 Mutation   • Serzone [Nefazodone]      CYP 3A4 and 3A5 Mutation   • Tape    • Tizanidine Hcl      Low blood pressure   • Valium      CYP 3A4 and 3A5 Mutation   • Xanax [Alprazolam]       "CYP 3A4 and 3A5 Mutation   • Zonisamide    • Zyprexa      CYP 3A4 and 3A5 Mutation     SURGHX:   Past Surgical History:   Procedure Laterality Date   • PB TEMPORAL ARTERY LIGATN OR BX Right 9/26/2020    Procedure: BIOPSY, ARTERY, TEMPORAL;  Surgeon: Perry Vale M.D.;  Location: SURGERY [facility];  Service: Vascular   • SPINAL CORD STIMULATOR  7/29/2015    Procedure: SPINAL CORD STIMULATOR PERC PERM;  Surgeon: Margarito Goode M.D.;  Location: SURGERY [facility];  Service:    • PB PERCUT IMPLNT NEUROELECT,EPIDURAL  7/15/2015    Procedure: IMPLANT NEUROSTIM EPI ARRAY - SPINAL CORD STIM TRIAL [employer];  Surgeon: Margarito Goode M.D.;  Location: SURGERY [facility];  Service: Pain Management   • PB PERCUT IMPLNT NEUROELECT,EPIDURAL  7/15/2015    Procedure: IMPLANT NEUROSTIM EPI ARRAY;  Surgeon: Margarito Goode M.D.;  Location: SURGERY [facility];  Service: Pain Management   • HIP ARTHROPLASTY TOTAL Left 2/2015   • CATARACT EXTRACTION WITH IOL Bilateral 2010   • ANAL FISTULOTOMY  8/27/08    Performed by ELLY RAMOS at SURGERY SAME DAY [facility]   • SHOULDER ARTHROTOMY Right 2006   • NERVE ULNAR TRANSFER Right 2004   • ELBOW ARTHROTOMY Left 1998   • KNEE ARTHROSCOPY Left 1995   • KNEE ARTHROTOMY Right 1992   • KNEE ARTHROTOMY Right 1990   • LUMBAR LAMINECTOMY DISKECTOMY  1988   • EYE SURGERY     • STENT PLACEMENT  2005,2010     SOCHX:  reports that he has never smoked. He has never used smokeless tobacco. He reports current alcohol use. He reports that he does not use drugs..  FH:   Family History   Problem Relation Age of Onset   • Diabetes Mother    • Heart Disease Mother    • Hyperlipidemia Mother    • Sleep Apnea Son          Physical Exam:  Vitals/ General Appearance:   Weight/BMI: Body mass index is 33.91 kg/m².  /72 (BP Location: Left arm, Patient Position: Sitting, BP Cuff Size: Adult)   Pulse 81   Resp 16   Ht 1.727 m (5' 8\")   Wt 101 kg (223 lb)   SpO2 94% " "  Vitals:    05/06/21 1514   BP: 122/72   BP Location: Left arm   Patient Position: Sitting   BP Cuff Size: Adult   Pulse: 81   Resp: 16   SpO2: 94%   Weight: 101 kg (223 lb)   Height: 1.727 m (5' 8\")       Pt. is alert and oriented to time, place and person. Cooperative and in no apparent distress.       Constitutional: Alert, no distress, well-groomed.  Skin: No rashes in visible areas.  Eye: Round. Conjunctiva clear, lids normal. No icterus.   ENMT: Lips pink without lesions, good dentition, moist mucous membranes. Phonation normal.  Neck: No masses, no thyromegaly. Moves freely without pain.  CV: Pulse as reported by patient  Respiratory: Unlabored respiratory effort, no cough or audible wheeze  Psych: Alert and oriented x3, normal affect and mood.     ASSESSMENT AND PLAN     1. Sleep Apnea  The pathophysiology of sleep anea and the increased risk of cardiovascular morbidity from untreated sleep apnea is discussed in detail with the patient.    He is urged to avoid supine sleep, weight gain and alcoholic beverages since all of these can worsen sleep apnea. He is cautioned against drowsy driving. If He feels sleepy while driving, He must pull over for a break/nap, rather than persist on the road, in the interest of He own safety and that of others on the road.   Plan   - Continue BiPAP ST  20/16 cm back up rate 13 BPM with  simplus   - Ordering replacement BiPAP ST due to current machine being over 5 years oldstops middle of the night which is interrupting patients sleep. New BiPAP ST 20/16 cm ack up rate 13 BPM  is ordered today.     - compliance download was reviewed and discussed with the pt   - compliance was reinforced     2.Chronic insomnia    - stimulus control and sleep hygiene reinforced   -  He is hesitant to use to use any sleep aid due to history of anaphylaxis on multiple of those medications    3. Hypersomnia: It is likely multifactorial due to his chronic condition and medications.  On his last " visit dayvigo was prescribed however the pharmacist recommended against due to his history of CY gene mutation.  We will start him on Adderall 5 mg every morning.  It is metabolize using CLJ170.  Recommended to discuss with the pharmacist to make sure that it will not interfere with his gene mutation.  Side effects were discussed in detail    Regarding treatment of other past medical problems and general health maintenance,  He is urged to follow up with PCP.

## 2021-05-06 NOTE — PATIENT INSTRUCTIONS
Amphetamine; Dextroamphetamine tablets  What is this medicine?  AMPHETAMINE; DEXTROAMPHETAMINE(am FET a meen; dex troe am FET a meen) is used to treat attention-deficit hyperactivity disorder (ADHD). It may also be used for narcolepsy. Federal law prohibits giving this medicine to any person other than the person for whom it was prescribed. Do not share this medicine with anyone else.  This medicine may be used for other purposes; ask your health care provider or pharmacist if you have questions.  COMMON BRAND NAME(S): Iman  What should I tell my health care provider before I take this medicine?  They need to know if you have any of these conditions:  · anxiety or panic attacks  · circulation problems in fingers and toes  · glaucoma  · hardening or blockages of the arteries or heart blood vessels  · heart disease or a heart defect  · high blood pressure  · history of a drug or alcohol abuse problem  · history of stroke  · kidney disease  · liver disease  · mental illness  · seizures  · suicidal thoughts, plans, or attempt; a previous suicide attempt by you or a family member  · thyroid disease  · Tourette's syndrome  · an unusual or allergic reaction to dextroamphetamine, other amphetamines, other medicines, foods, dyes, or preservatives  · pregnant or trying to get pregnant  · breast-feeding  How should I use this medicine?  Take this medicine by mouth with a glass of water. Follow the directions on the prescription label. Take your doses at regular intervals. Do not take your medicine more often than directed. Do not suddenly stop your medicine. You must gradually reduce the dose or you may feel withdrawal effects. Ask your doctor or health care professional for advice.  Talk to your pediatrician regarding the use of this medicine in children. Special care may be needed. While this drug may be prescribed for children as young as 3 years for selected conditions, precautions do apply.  Overdosage: If you think  you have taken too much of this medicine contact a poison control center or emergency room at once.  NOTE: This medicine is only for you. Do not share this medicine with others.  What if I miss a dose?  If you miss a dose, take it as soon as you can. If it is almost time for your next dose, take only that dose. Do not take double or extra doses.  What may interact with this medicine?  Do not take this medicine with any of the following medications:  · MAOIs like Carbex, Eldepryl, Marplan, Nardil, and Parnate  · other stimulant medicines for attention disorders  This medicine may also interact with the following medications:  · acetazolamide  · ammonium chloride  · antacids  · ascorbic acid  · atomoxetine  · caffeine  · certain medicines for blood pressure  · certain medicines for depression, anxiety, or psychotic disturbances  · certain medicines for seizures like carbamazepine, phenobarbital, phenytoin  · certain medicines for stomach problems like cimetidine, ranitidine, famotidine, esomeprazole, omeprazole, lansoprazole, pantoprazole  · lithium  · medicines for colds and breathing difficulties  · medicines for diabetes  · medicines or dietary supplements for weight loss or to stay awake  · methenamine  · narcotic medicines for pain  · quinidine  · ritonavir  · sodium bicarbonate  · Powhattan's wort  This list may not describe all possible interactions. Give your health care provider a list of all the medicines, herbs, non-prescription drugs, or dietary supplements you use. Also tell them if you smoke, drink alcohol, or use illegal drugs. Some items may interact with your medicine.  What should I watch for while using this medicine?  Visit your doctor or health care professional for regular checks on your progress. This prescription requires that you follow special procedures with your doctor and pharmacy. You will need to have a new written prescription from your doctor every time you need a refill.  This medicine  may affect your concentration, or hide signs of tiredness. Until you know how this medicine affects you, do not drive, ride a bicycle, use machinery, or do anything that needs mental alertness.  Tell your doctor or health care professional if this medicine loses its effects, or if you feel you need to take more than the prescribed amount. Do not change the dosage without talking to your doctor or health care professional.  Decreased appetite is a common side effect when starting this medicine. Eating small, frequent meals or snacks can help. Talk to your doctor if you continue to have poor eating habits. Height and weight growth of a child taking this medicine will be monitored closely.  Do not take this medicine close to bedtime. It may prevent you from sleeping.  If you are going to need surgery, a MRI, CT scan, or other procedure, tell your doctor that you are taking this medicine. You may need to stop taking this medicine before the procedure.  Tell your doctor or healthcare professional right away if you notice unexplained wounds on your fingers and toes while taking this medicine. You should also tell your healthcare provider if you experience numbness or pain, changes in the skin color, or sensitivity to temperature in your fingers or toes.  What side effects may I notice from receiving this medicine?  Side effects that you should report to your doctor or health care professional as soon as possible:  · allergic reactions like skin rash, itching or hives, swelling of the face, lips, or tongue  · anxious  · breathing problems  · changes in emotions or moods  · changes in vision  · chest pain or chest tightness  · fast, irregular heartbeat  · fingers or toes feel numb, cool, painful  · hallucination, loss of contact with reality  · high blood pressure  · males: prolonged or painful erection  · seizures  · signs and symptoms of serotonin syndrome like confusion, increased sweating, fever, tremor, stiff muscles,  diarrhea  · signs and symptoms of a stroke like changes in vision; confusion; trouble speaking or understanding; severe headaches; sudden numbness or weakness of the face, arm or leg; trouble walking; dizziness; loss of balance or coordination  · suicidal thoughts or other mood changes  · uncontrollable head, mouth, neck, arm, or leg movements  Side effects that usually do not require medical attention (report to your doctor or health care professional if they continue or are bothersome):  · dry mouth  · headache  · irritability  · loss of appetite  · nausea  · trouble sleeping  · weight loss  This list may not describe all possible side effects. Call your doctor for medical advice about side effects. You may report side effects to FDA at 6-720-QPE-8471.  Where should I keep my medicine?  Keep out of the reach of children. This medicine can be abused. Keep your medicine in a safe place to protect it from theft. Do not share this medicine with anyone. Selling or giving away this medicine is dangerous and against the law.  Store at room temperature between 15 and 30 degrees C (59 and 86 degrees F). Keep container tightly closed. Throw away any unused medicine after the expiration date. Dispose of properly. This medicine may cause accidental overdose and death if it is taken by other adults, children, or pets. Mix any unused medicine with a substance like cat litter or coffee grounds. Then throw the medicine away in a sealed container like a sealed bag or a coffee can with a lid. Do not use the medicine after the expiration date.  NOTE: This sheet is a summary. It may not cover all possible information. If you have questions about this medicine, talk to your doctor, pharmacist, or health care provider.  © 2020 [employer] (2018-02-09 16:28:23)

## 2021-05-07 ENCOUNTER — TELEPHONE (OUTPATIENT)
Dept: SLEEP MEDICINE | Facility: MEDICAL CENTER | Age: 81
End: 2021-05-07

## 2021-05-07 NOTE — TELEPHONE ENCOUNTER
[facility] for the pt that I have not receive his records from Dr. Pulido so I am unable to send the order for the new bipap machine. I informed the pt that I will fax the release of records from 3/24/2021 again to [phone] and [phone]. I also left a VM for  [facility] records asking them if they receive the release of records that was sent to them and i informed them that I will be sending it again.

## 2021-05-10 ENCOUNTER — APPOINTMENT (OUTPATIENT)
Dept: SPEECH THERAPY | Facility: REHABILITATION | Age: 81
End: 2021-05-10
Attending: FAMILY MEDICINE
Payer: MEDICARE

## 2021-05-10 DIAGNOSIS — R46.89 COGNITIVE AND BEHAVIORAL CHANGES: ICD-10-CM

## 2021-05-10 DIAGNOSIS — R41.89 COGNITIVE AND BEHAVIORAL CHANGES: ICD-10-CM

## 2021-05-19 ENCOUNTER — APPOINTMENT (OUTPATIENT)
Dept: SPEECH THERAPY | Facility: REHABILITATION | Age: 81
End: 2021-05-19
Attending: FAMILY MEDICINE
Payer: MEDICARE

## 2021-05-20 ENCOUNTER — OFFICE VISIT (OUTPATIENT)
Dept: MEDICAL GROUP | Facility: MEDICAL CENTER | Age: 81
End: 2021-05-20
Payer: MEDICARE

## 2021-05-20 VITALS
WEIGHT: 224.6 LBS | DIASTOLIC BLOOD PRESSURE: 68 MMHG | BODY MASS INDEX: 35.25 KG/M2 | TEMPERATURE: 97.5 F | HEART RATE: 78 BPM | OXYGEN SATURATION: 93 % | RESPIRATION RATE: 20 BRPM | HEIGHT: 67 IN | SYSTOLIC BLOOD PRESSURE: 130 MMHG

## 2021-05-20 DIAGNOSIS — G47.33 OBSTRUCTIVE SLEEP APNEA SYNDROME: ICD-10-CM

## 2021-05-20 DIAGNOSIS — F01.50 VASCULAR DEMENTIA WITHOUT BEHAVIORAL DISTURBANCE (HCC): ICD-10-CM

## 2021-05-20 DIAGNOSIS — E11.9 TYPE 2 DIABETES MELLITUS WITHOUT COMPLICATION, WITHOUT LONG-TERM CURRENT USE OF INSULIN (HCC): ICD-10-CM

## 2021-05-20 DIAGNOSIS — L57.0 ACTINIC KERATOSES: ICD-10-CM

## 2021-05-20 DIAGNOSIS — Z12.83 SKIN CANCER SCREENING: ICD-10-CM

## 2021-05-20 PROCEDURE — 99214 OFFICE O/P EST MOD 30 MIN: CPT | Performed by: FAMILY MEDICINE

## 2021-05-20 RX ORDER — FLUOROURACIL 50 MG/G
CREAM TOPICAL
Qty: 40 G | Refills: 0 | Status: SHIPPED | OUTPATIENT
Start: 2021-05-20 | End: 2021-10-28

## 2021-05-20 ASSESSMENT — FIBROSIS 4 INDEX: FIB4 SCORE: 1.41

## 2021-05-21 NOTE — ASSESSMENT & PLAN NOTE
Patient states that he was recently given a diagnosis by his psychiatrist of vascular dementia.  He is already on aggressive cardiovascular risk reduction.  He routinely follows with cardiology.  This diagnosis is putting degree of stress on him.  2020 CT scan of the head does demonstrate ischemic change.

## 2021-05-21 NOTE — PROGRESS NOTES
[facility]  Progress Note  Established Patient    Subjective:   Amarjit Khan is a 81 y.o. male here today with a chief complaint of skin lesions.     Vascular dementia (HCC)  Patient states that he was recently given a diagnosis by his psychiatrist of vascular dementia.  He is already on aggressive cardiovascular risk reduction.  He routinely follows with cardiology.  This diagnosis is putting degree of stress on him.  2020 CT scan of the head does demonstrate ischemic change.    Skin cancer screening  Patient has a number of skin lesions that he would like evaluated.    Actinic keratoses  Patient describes a rough, red skin lesions on the bilateral forearms.    Obstructive sleep apnea syndrome  Following with sleep medicine.  The patient was prescribed Adderall recently for his hypersomnia.  He has been reluctant to use it because of his cardiac history.    Type 2 diabetes mellitus without complication, without long-term current use of insulin ([facility])  Patient was recently started on empagliflozin and his Metformin was stopped.      Current Outpatient Medications on File Prior to Visit   Medication Sig Dispense Refill   • Empagliflozin 10 MG Tab Take 1 tablet by mouth every day. 90 tablet 0   • XARELTO 20 MG Tab tablet Take 20 mg by mouth with dinner.     • zonisamide (ZONEGRAN) 25 MG capsule Take 25 mg by mouth every day. Take 1 capsule by mouth two times a day     • DULoxetine (CYMBALTA) 60 MG Cap DR Particles delayed-release capsule Take 1 Cap by mouth every day. 90 Cap 0   • metoprolol SR (TOPROL XL) 25 MG TABLET SR 24 HR Take 0.5 Tabs by mouth 2 Times a Day. 180 Tab 0   • lisinopril-hydrochlorothiazide (PRINZIDE, ZESTORETIC) 20-12.5 MG per tablet Take 1 Tab by mouth every day. 30 Tab 0   • atorvastatin (LIPITOR) 80 MG tablet Take 40 mg by mouth every day.     • HYDROmorphone (DILAUDID) 2 MG Tab Take 1 mg by mouth 2 times a day as needed for Severe Pain. Alternates 1 tab Q 2 day and 2 tab Q2 day    "  • aspirin (ASA) 81 MG Chew Tab chewable tablet Take 1 Tab by mouth every day. 100 Tab 0   • Cholecalciferol (VITAMIN D) 2000 UNIT CAPS Take 2,000 Units by mouth every day.     • tramadol (ULTRAM) 50 MG Tab Take 50 mg by mouth every four hours as needed.       No current facility-administered medications on file prior to visit.          Objective:     Vitals:    05/20/21 1610   BP: 130/68   BP Location: Left arm   Patient Position: Sitting   BP Cuff Size: Adult long   Pulse: 78   Resp: 20   Temp: 36.4 °C (97.5 °F)   TempSrc: Temporal   SpO2: 93%   Weight: 102 kg (224 lb 9.6 oz)   Height: 1.702 m (5' 7\")       Physical Exam:  General: alert in no apparent distress.   Skin: bilateral arm AK's.         Assessment and Plan:     1. Skin cancer screening  - REFERRAL TO DERMATOLOGY    2. Actinic keratoses  - fluorouracil (EFUDEX) 5 % cream; Apply a thin layer to arms BID x 3 weeks  Dispense: 40 g; Refill: 0    3. Vascular dementia without behavioral disturbance (HCC)  -Support provided, patient will continue cardiovascular risk reduction medications and cardiology follow-up.    4. Obstructive sleep apnea syndrome  -Follow-up sleep medicine.  I informed the patient that Adderall would increase his risk of heart attack and arrhythmia.  The patient is not willing to take this risk and so will not take this medication.    5. Type 2 diabetes mellitus without complication, without long-term current use of insulin (HCC)  -Continue empagliflozin, A1c in follow-up with further titration as needed.        Followup: Return if symptoms worsen or fail to improve.         "

## 2021-05-21 NOTE — ASSESSMENT & PLAN NOTE
Following with sleep medicine.  The patient was prescribed Adderall recently for his hypersomnia.  He has been reluctant to use it because of his cardiac history.

## 2021-05-25 ENCOUNTER — APPOINTMENT (OUTPATIENT)
Dept: SPEECH THERAPY | Facility: REHABILITATION | Age: 81
End: 2021-05-25
Attending: FAMILY MEDICINE
Payer: MEDICARE

## 2021-05-27 ENCOUNTER — PATIENT OUTREACH (OUTPATIENT)
Dept: HEALTH INFORMATION MANAGEMENT | Facility: OTHER | Age: 81
End: 2021-05-27

## 2021-05-27 NOTE — PROGRESS NOTES
Outcome: Left Message Comprehensive Health Assessment      Please transfer to Patient Outreach Team at [phone] when patient returns call.    Attempt # 2

## 2021-06-03 ENCOUNTER — TELEPHONE (OUTPATIENT)
Dept: MEDICAL GROUP | Facility: MEDICAL CENTER | Age: 81
End: 2021-06-03

## 2021-06-03 NOTE — TELEPHONE ENCOUNTER
Pt called asking for speech therapy referral to be sent to [facility] for speech therapy.    Is it possible to change referral to go to them?    Thank you

## 2021-06-08 ENCOUNTER — TELEPHONE (OUTPATIENT)
Dept: SLEEP MEDICINE | Facility: MEDICAL CENTER | Age: 81
End: 2021-06-08

## 2021-06-08 NOTE — TELEPHONE ENCOUNTER
Spoke with [facility] in order to ask if they provide speech therapy services as an out pt basis and they stated that they're not sure plus they do not take senior care plus insurance. I called pt and left a message on their voice mail informing.   I asked that they call me back if they have any further questions.

## 2021-06-08 NOTE — TELEPHONE ENCOUNTER
We have been trying to get SSs from [facility] office since March  At this time since we have not received them and most likely won't as some studies from that office were done through [employer] and no longer available.    Please call pt and offer in lab split night (HSS not suggested since pt has BiPAP and needs new machine)     Another option is to ask what DME he was using prior to becoming SCP and I can see if it is one of the companies that will share records (Like [employer] or [employer])    I have not had order signed, if pt agrees to repeat testing, then have order signed

## 2021-06-14 NOTE — TELEPHONE ENCOUNTER
LVM for the pt informing him that We have been trying to get SSs from [facility] office since March  At this time since we have not received them and most likely won't as some studies from that office were done through [employer] and no longer available.     Please call pt and offer in lab split night (HSS not suggested since pt has BiPAP and needs new machine)      Another option is to ask what DME he was using prior to becoming SCP and I can see if it is one of the companies that will share records (Like [employer] or [employer])     I advise the pt to give me a call back to let me know what he would like to do regarding this.

## 2021-06-16 ENCOUNTER — APPOINTMENT (OUTPATIENT)
Dept: SPEECH THERAPY | Facility: REHABILITATION | Age: 81
End: 2021-06-16
Attending: FAMILY MEDICINE
Payer: MEDICARE

## 2021-06-21 ENCOUNTER — APPOINTMENT (OUTPATIENT)
Dept: SPEECH THERAPY | Facility: REHABILITATION | Age: 81
End: 2021-06-21
Attending: FAMILY MEDICINE
Payer: MEDICARE

## 2021-07-02 NOTE — TELEPHONE ENCOUNTER
LVM for the pt informing him that We have been trying to get SSs from [facility] office since March At this time since we have not received them and most likely won't as some studies from that office were done through [employer] and no longer available.     I asked the pt if he had a prior DME to becoming SCP and I can see if it is one of the companies that will share records (Like [employer] or [employer]). If not another option is to have the pt complete  in lab split night (HSS not suggested since pt has BiPAP and needs new machine).       I advise the pt to give me a call back to let me know what he would like to do regarding this and a [employer] message was sent to the pt as well.

## 2021-07-08 ENCOUNTER — OFFICE VISIT (OUTPATIENT)
Dept: MEDICAL GROUP | Facility: MEDICAL CENTER | Age: 81
End: 2021-07-08
Payer: MEDICARE

## 2021-07-08 VITALS
WEIGHT: 219.8 LBS | SYSTOLIC BLOOD PRESSURE: 124 MMHG | DIASTOLIC BLOOD PRESSURE: 70 MMHG | HEIGHT: 67 IN | RESPIRATION RATE: 20 BRPM | TEMPERATURE: 97.9 F | BODY MASS INDEX: 34.5 KG/M2 | HEART RATE: 63 BPM | OXYGEN SATURATION: 94 %

## 2021-07-08 DIAGNOSIS — R53.81 DEBILITY: ICD-10-CM

## 2021-07-08 DIAGNOSIS — Z23 NEED FOR VACCINATION: ICD-10-CM

## 2021-07-08 DIAGNOSIS — I48.0 PAROXYSMAL ATRIAL FIBRILLATION (HCC): ICD-10-CM

## 2021-07-08 DIAGNOSIS — F01.50 VASCULAR DEMENTIA WITHOUT BEHAVIORAL DISTURBANCE (HCC): ICD-10-CM

## 2021-07-08 DIAGNOSIS — E11.9 TYPE 2 DIABETES MELLITUS WITHOUT COMPLICATION, WITHOUT LONG-TERM CURRENT USE OF INSULIN (HCC): ICD-10-CM

## 2021-07-08 LAB
HBA1C MFR BLD: 6.4 % (ref 0–5.6)
INT CON NEG: NEGATIVE
INT CON POS: POSITIVE

## 2021-07-08 PROCEDURE — 99214 OFFICE O/P EST MOD 30 MIN: CPT | Mod: 25 | Performed by: FAMILY MEDICINE

## 2021-07-08 PROCEDURE — 83036 HEMOGLOBIN GLYCOSYLATED A1C: CPT | Performed by: FAMILY MEDICINE

## 2021-07-08 PROCEDURE — 90750 HZV VACC RECOMBINANT IM: CPT | Performed by: FAMILY MEDICINE

## 2021-07-08 PROCEDURE — 90471 IMMUNIZATION ADMIN: CPT | Performed by: FAMILY MEDICINE

## 2021-07-08 ASSESSMENT — FIBROSIS 4 INDEX: FIB4 SCORE: 1.41

## 2021-07-08 NOTE — LETTER
2021        Re: Amarjit Khan : 1940    To whom it may concern:     Mr. Khan has difficulty with transferring, bathing, dressing, medication management. I am referring him to cognitive therapy.     Sincerely,           Perry Canchola M.D.

## 2021-07-09 DIAGNOSIS — E11.9 TYPE 2 DIABETES MELLITUS WITHOUT COMPLICATION, WITHOUT LONG-TERM CURRENT USE OF INSULIN (HCC): ICD-10-CM

## 2021-07-09 DIAGNOSIS — R53.83 OTHER FATIGUE: ICD-10-CM

## 2021-07-09 DIAGNOSIS — G89.29 OTHER CHRONIC PAIN: ICD-10-CM

## 2021-07-09 DIAGNOSIS — I10 ESSENTIAL HYPERTENSION: ICD-10-CM

## 2021-07-09 PROBLEM — R53.81 DEBILITY: Status: ACTIVE | Noted: 2021-07-09

## 2021-07-09 PROBLEM — Z12.83 SKIN CANCER SCREENING: Status: RESOLVED | Noted: 2021-05-20 | Resolved: 2021-07-09

## 2021-07-09 PROBLEM — L57.0 ACTINIC KERATOSES: Status: RESOLVED | Noted: 2021-05-20 | Resolved: 2021-07-09

## 2021-07-09 RX ORDER — ATORVASTATIN CALCIUM 80 MG/1
40 TABLET, FILM COATED ORAL DAILY
Qty: 50 TABLET | Refills: 4 | Status: SHIPPED | OUTPATIENT
Start: 2021-07-09 | End: 2021-12-14 | Stop reason: SDUPTHER

## 2021-07-09 RX ORDER — EMPAGLIFLOZIN 10 MG/1
1 TABLET, FILM COATED ORAL
Qty: 100 TABLET | Refills: 4 | Status: SHIPPED | OUTPATIENT
Start: 2021-07-09 | End: 2021-07-26 | Stop reason: SDUPTHER

## 2021-07-09 RX ORDER — LISINOPRIL AND HYDROCHLOROTHIAZIDE 20; 12.5 MG/1; MG/1
1 TABLET ORAL DAILY
Qty: 100 TABLET | Refills: 4 | Status: SHIPPED | OUTPATIENT
Start: 2021-07-09 | End: 2021-08-30

## 2021-07-09 RX ORDER — DULOXETIN HYDROCHLORIDE 60 MG/1
60 CAPSULE, DELAYED RELEASE ORAL DAILY
Qty: 100 CAPSULE | Refills: 4 | Status: SHIPPED | OUTPATIENT
Start: 2021-07-09 | End: 2022-06-20 | Stop reason: SDUPTHER

## 2021-07-09 RX ORDER — METOPROLOL SUCCINATE 25 MG/1
12.5 TABLET, EXTENDED RELEASE ORAL 2 TIMES DAILY
Qty: 100 TABLET | Refills: 4 | Status: SHIPPED | OUTPATIENT
Start: 2021-07-09 | End: 2022-06-20 | Stop reason: SDUPTHER

## 2021-07-09 NOTE — ASSESSMENT & PLAN NOTE
The patient was given a diagnosis by his psychiatrist of vascular dementia.  He is requesting referral to home health for cognitive therapy.  Apparently, he has long-term care insurance that requires a doctor's referral for this service..  He is currently going to outpatient physical therapy.  Patient states that several months ago he also saw a neurologist, Dr. Madden.

## 2021-07-09 NOTE — TELEPHONE ENCOUNTER
Pt asked if his medications would be switched to [facility].  Dr. Canchola, I pended the orders. Please sig if ok or advise...

## 2021-07-09 NOTE — ASSESSMENT & PLAN NOTE
Patient's A1c is at goal on Jardiance.  He has been successful with some weight loss on this medication.  He is also taking Lipitor, aspirin and lisinopril.  He denies hypoglycemic events.

## 2021-07-09 NOTE — ASSESSMENT & PLAN NOTE
The patient reports difficulty with transferring, bathing, dressing and medication management.  He is currently going to outpatient physical therapy and requesting a referral to home health for speech therapy.  His son and wife help him where they can.

## 2021-07-09 NOTE — PROGRESS NOTES
[facility]  Progress Note  Established Patient    Subjective:   Amarjit Khan is a 81 y.o. male here today with a chief complaint of vascular dementia. The patient is accompanied by his son, Chon. All of us are masked.    Vascular dementia (HCC)  The patient was given a diagnosis by his psychiatrist of vascular dementia.  He is requesting referral to home health for cognitive therapy.  Apparently, he has long-term care insurance that requires a doctor's referral for this service..  He is currently going to outpatient physical therapy.  Patient states that several months ago he also saw a neurologist, Dr. Madden.    Type 2 diabetes mellitus without complication, without long-term current use of insulin ([facility])  Patient's A1c is at goal on Jardiance.  He has been successful with some weight loss on this medication.  He is also taking Lipitor, aspirin and lisinopril.  He denies hypoglycemic events.    Paroxysmal atrial fibrillation ([facility])  Patient reports some palpitations lately.  His rate is controlled and he is on both Xarelto and Toprol-XL.    Debility  The patient reports difficulty with transferring, bathing, dressing and medication management.  He is currently going to outpatient physical therapy and requesting a referral to home health for speech therapy.  His son and wife help him where they can.      Current Outpatient Medications on File Prior to Visit   Medication Sig Dispense Refill   • fluorouracil (EFUDEX) 5 % cream Apply a thin layer to arms BID x 3 weeks 40 g 0   • XARELTO 20 MG Tab tablet Take 20 mg by mouth with dinner.     • zonisamide (ZONEGRAN) 25 MG capsule Take 25 mg by mouth every day. Take 1 capsule by mouth two times a day     • tramadol (ULTRAM) 50 MG Tab Take 50 mg by mouth every four hours as needed.     • HYDROmorphone (DILAUDID) 2 MG Tab Take 1 mg by mouth 2 times a day as needed for Severe Pain. Alternates 1 tab Q 2 day and 2 tab Q2 day     • aspirin (ASA) 81 MG Chew Tab  "chewable tablet Take 1 Tab by mouth every day. 100 Tab 0   • Cholecalciferol (VITAMIN D) 2000 UNIT CAPS Take 2,000 Units by mouth every day.       No current facility-administered medications on file prior to visit.          Objective:     Vitals:    07/08/21 1702   BP: 124/70   BP Location: Left arm   Patient Position: Sitting   BP Cuff Size: Adult long   Pulse: 63   Resp: 20   Temp: 36.6 °C (97.9 °F)   TempSrc: Temporal   SpO2: 94%   Weight: 99.7 kg (219 lb 12.8 oz)   Height: 1.702 m (5' 7\")       Physical Exam:  General: alert, tearful.         Assessment and Plan:     1. Type 2 diabetes mellitus without complication, without long-term current use of insulin (HCC)  Doing well on empagliflozin.  -Continue current regimen.    2. Need for vaccination  - Shingrix Vaccine    3. Vascular dementia without behavioral disturbance (HCC)  Reported diagnosis.  I informed the patient that I would not be able to place a home health referral because he does not appear to be currently homebound, however, I would be happy to refer him to outpatient cognitive therapy.  He agrees to this.  I will also request the records from his psychologist, who is located at his pain doctor's office, to further investigate this diagnosis.  I will also request the notes from his neurologist from several months ago.  - REFERRAL TO SPEECH THERAPY    4. Paroxysmal atrial fibrillation (HCC)  -Rate controlled, continue current regimen.    5. Debility  -Patient will continue with outpatient physical therapy, I will refer him to outpatient speech therapy as discussed above.  -At the patient's request, letter provided to him noting his current challenges with some ADLs.        Followup: Return in about 3 months (around 10/8/2021), or if symptoms worsen or fail to improve, for DM.         "

## 2021-07-09 NOTE — ASSESSMENT & PLAN NOTE
Patient reports some palpitations lately.  His rate is controlled and he is on both Xarelto and Toprol-XL.

## 2021-07-26 DIAGNOSIS — E11.9 TYPE 2 DIABETES MELLITUS WITHOUT COMPLICATION, WITHOUT LONG-TERM CURRENT USE OF INSULIN (HCC): ICD-10-CM

## 2021-07-26 RX ORDER — EMPAGLIFLOZIN 10 MG/1
1 TABLET, FILM COATED ORAL
Qty: 100 TABLET | Refills: 4 | Status: SHIPPED | OUTPATIENT
Start: 2021-07-26 | End: 2022-06-20 | Stop reason: SDUPTHER

## 2021-07-26 NOTE — TELEPHONE ENCOUNTER
Received request via: Pharmacy    Was the patient seen in the last year in this department? Yes    Does the patient have an active prescription (recently filled or refills available) for medication(s) requested? [facility]

## 2021-08-19 ENCOUNTER — OFFICE VISIT (OUTPATIENT)
Dept: URGENT CARE | Facility: PHYSICIAN GROUP | Age: 81
End: 2021-08-19
Payer: MEDICARE

## 2021-08-19 ENCOUNTER — TELEPHONE (OUTPATIENT)
Dept: MEDICAL GROUP | Facility: MEDICAL CENTER | Age: 81
End: 2021-08-19

## 2021-08-19 VITALS
BODY MASS INDEX: 34.37 KG/M2 | HEIGHT: 67 IN | OXYGEN SATURATION: 96 % | RESPIRATION RATE: 20 BRPM | SYSTOLIC BLOOD PRESSURE: 108 MMHG | DIASTOLIC BLOOD PRESSURE: 66 MMHG | WEIGHT: 219 LBS | HEART RATE: 84 BPM | TEMPERATURE: 97.4 F

## 2021-08-19 DIAGNOSIS — R51.9 ACUTE NONINTRACTABLE HEADACHE, UNSPECIFIED HEADACHE TYPE: ICD-10-CM

## 2021-08-19 DIAGNOSIS — S09.90XA TRAUMATIC INJURY OF HEAD, INITIAL ENCOUNTER: ICD-10-CM

## 2021-08-19 DIAGNOSIS — Z79.01 CHRONIC ANTICOAGULATION: ICD-10-CM

## 2021-08-19 DIAGNOSIS — M54.50 LUMBAR BACK PAIN: ICD-10-CM

## 2021-08-19 DIAGNOSIS — M25.552 LEFT HIP PAIN: ICD-10-CM
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
## 2021-08-19 PROCEDURE — 99214 OFFICE O/P EST MOD 30 MIN: CPT | Performed by: FAMILY MEDICINE

## 2021-08-19 ASSESSMENT — ENCOUNTER SYMPTOMS
HEADACHES: 1
SORE THROAT: 0
FEVER: 0
VOMITING: 0
FOCAL WEAKNESS: 0
MYALGIAS: 0
NAUSEA: 0
WEAKNESS: 0
BACK PAIN: 1
SHORTNESS OF BREATH: 0
DIZZINESS: 0
COUGH: 0

## 2021-08-19 ASSESSMENT — FIBROSIS 4 INDEX: FIB4 SCORE: 1.41

## 2021-08-19 NOTE — TELEPHONE ENCOUNTER
Patient was present at his wife's appointment today. He relayed to me some symptoms that sound consistent with neuropathy. He also mentioned that he fell over the weekend and hit his head. He's on Xarelto. I recommended that he go to  today to discuss the fall as he may require neuroimaging. I also asked some of the staff to make the next avail appt to see me to follow up.

## 2021-08-19 NOTE — PROGRESS NOTES
Subjective:   Amarjit Khan is a 81 y.o. male who presents for Back Pain (Pt states that he fell in the shower; 4x days), Head Ache (Pt states that he hit his head; pt doesnt remember hitting his head), and Hip Injury (Left hip, has had it replaced. )        Back Pain  Associated symptoms include headaches. Pertinent negatives include no fever or weakness.   Head Ache   This is a new problem. Episode onset: 4 days prior complains of slip and fall in bathroom and striking right anterior forehead, denies specific recollection of the fall yet states spontaneously recovering and noting swelling and bruising right anterior forehead  The problem occurs constantly. The problem has been unchanged. The pain is located in the right unilateral region. Associated symptoms include back pain. Pertinent negatives include no coughing, dizziness, fever, nausea, sore throat, vomiting or weakness. Associated symptoms comments: Complains of left hip pain with history of status post left hip arthroplasty, complains of back pain after fall. Exacerbated by: Currently compliant with Xarelto for chronic anticoagulation for atrial fibrillation. Treatments tried: Relative rest. The treatment provided no relief. (Vascular dementia, diabetes mellitus, chronic anticoagulation)   Hip Injury  Associated symptoms include headaches. Pertinent negatives include no coughing, fever, myalgias, nausea, rash, sore throat, vomiting or weakness.     PMH:  has a past medical history of Acute MI ([facility]) (1997), Allergy, Arthritis, Cancer ([facility]), Chickenpox, High cholesterol, Hyperlipidemia, Hypertension, Muscle disorder, Personal history of venous thrombosis and embolism (2004), Rectal abscess, Rheumatic fever (as child), Sleep apnea, and Unspecified cataract. He also has no past medical history of Stroke ([facility]).  MEDS:   Current Outpatient Medications:   •  Empagliflozin (JARDIANCE) 10 MG Tab, Take 1 tablet by mouth every day., Disp: 100 tablet, Rfl: 4  •   DULoxetine (CYMBALTA) 60 MG Cap DR Particles delayed-release capsule, Take 1 capsule by mouth every day., Disp: 100 capsule, Rfl: 4  •  metoprolol SR (TOPROL XL) 25 MG TABLET SR 24 HR, Take 0.5 Tablets by mouth 2 times a day., Disp: 100 tablet, Rfl: 4  •  lisinopril-hydrochlorothiazide (PRINZIDE) 20-12.5 MG per tablet, Take 1 tablet by mouth every day., Disp: 100 tablet, Rfl: 4  •  atorvastatin (LIPITOR) 80 MG tablet, Take 0.5 Tablets by mouth every day., Disp: 50 tablet, Rfl: 4  •  fluorouracil (EFUDEX) 5 % cream, Apply a thin layer to arms BID x 3 weeks, Disp: 40 g, Rfl: 0  •  XARELTO 20 MG Tab tablet, Take 20 mg by mouth with dinner., Disp: , Rfl:   •  zonisamide (ZONEGRAN) 25 MG capsule, Take 25 mg by mouth every day. Take 1 capsule by mouth two times a day, Disp: , Rfl:   •  tramadol (ULTRAM) 50 MG Tab, Take 50 mg by mouth every four hours as needed., Disp: , Rfl:   •  HYDROmorphone (DILAUDID) 2 MG Tab, Take 1 mg by mouth 2 times a day as needed for Severe Pain. Alternates 1 tab Q 2 day and 2 tab Q2 day, Disp: , Rfl:   •  aspirin (ASA) 81 MG Chew Tab chewable tablet, Take 1 Tab by mouth every day., Disp: 100 Tab, Rfl: 0  •  Cholecalciferol (VITAMIN D) 2000 UNIT CAPS, Take 2,000 Units by mouth every day., Disp: , Rfl:   ALLERGIES:   Allergies   Allergen Reactions   • Butrans [Buprenorphine] Anaphylaxis     CYP 3A4 and 3A5 Mutation   • Codeine Anaphylaxis     CYP 3A4 and 3A5 Mutation   • Darvocet [Propoxyphene N-Apap] Anaphylaxis   • Fentanyl Anaphylaxis     CYP 3A4 and 3A5 Mutation  Per pt. Can have fentanyl but in small amounts. Per pt. Has tolerated fentanyl in past.      • Nucynta [Tapentadol] Anaphylaxis and Swelling   • Percocet [Oxycodone-Acetaminophen] Anaphylaxis   • Ambien [Zolpidem]      CYP 3A4 and 3A5 Mutation   • Biaxin [Clarithromycin]      CYP 3A4 and 3A5 Mutation   • Buspar [Buspirone]      CYP 3A4 and 3A5 Mutation   • Celexa      CYP 3A4 and 3A5 Mutation   • Citalopram    • Clindamycin    •  Clonazepam      CYP 3A4 and 3A5 Mutation   • Demerol    • Diltiazem      CYP 3A4 and 3A5 Mutation   • Doxepin      CYP 3A4 and 3A5 Mutation   • Effexor [Venlafaxine]      CYP 3A4 and 3A5 Mutation   • Erythromycin      CYP 3A4 and 3A5 Mutation   • Haldol [Haloperidol]      CYP 3A4 and 3A5 Mutation   • Hydrocodone    • Lexapro      CYP 3A4 and 3A5 Mutation   • Lunesta      CYP 3A4 and 3A5 Mutation   • Lyrica Swelling   • Morphine Sulfate [Bupropion] Hives   • Remeron [Mirtazapine]      CYP 3A4 and 3A5 Mutation   • Serzone [Nefazodone]      CYP 3A4 and 3A5 Mutation   • Tape    • Tizanidine Hcl      Low blood pressure   • Valium      CYP 3A4 and 3A5 Mutation   • Xanax [Alprazolam]      CYP 3A4 and 3A5 Mutation   • Zonisamide    • Zyprexa      CYP 3A4 and 3A5 Mutation     SURGHX:   Past Surgical History:   Procedure Laterality Date   • PB TEMPORAL ARTERY LIGATN OR BX Right 9/26/2020    Procedure: BIOPSY, ARTERY, TEMPORAL;  Surgeon: Perry Vale M.D.;  Location: SURGERY [facility];  Service: Vascular   • SPINAL CORD STIMULATOR  7/29/2015    Procedure: SPINAL CORD STIMULATOR PERC PERM;  Surgeon: Margarito Goode M.D.;  Location: [facility];  Service:    • PB PERCUT IMPLNT NEUROELECT,EPIDURAL  7/15/2015    Procedure: IMPLANT NEUROSTIM EPI ARRAY - SPINAL CORD STIM TRIAL [employer];  Surgeon: Margarito Goode M.D.;  Location: SURGERY [facility];  Service: Pain Management   • PB PERCUT IMPLNT NEUROELECT,EPIDURAL  7/15/2015    Procedure: IMPLANT NEUROSTIM EPI ARRAY;  Surgeon: Margarito Goode M.D.;  Location: SURGERY [facility];  Service: Pain Management   • HIP ARTHROPLASTY TOTAL Left 2/2015   • CATARACT EXTRACTION WITH IOL Bilateral 2010   • ANAL FISTULOTOMY  8/27/08    Performed by ELLY RAMOS at SURGERY SAME DAY [facility] ORS   • SHOULDER ARTHROTOMY Right 2006   • NERVE ULNAR TRANSFER Right 2004   • ELBOW ARTHROTOMY Left 1998   • KNEE ARTHROSCOPY Left 1995   • KNEE ARTHROTOMY Right  "1992   • KNEE ARTHROTOMY Right 1990   • LUMBAR LAMINECTOMY DISKECTOMY  1988   • EYE SURGERY     • STENT PLACEMENT  2005,2010     SOCHX:  reports that he has never smoked. He has never used smokeless tobacco. He reports current alcohol use. He reports that he does not use drugs.  FH:   Family History   Problem Relation Age of Onset   • Diabetes Mother    • Heart Disease Mother    • Hyperlipidemia Mother    • Sleep Apnea Son      Review of Systems   Constitutional: Negative for fever.   HENT: Negative for sore throat.    Respiratory: Negative for cough and shortness of breath.    Gastrointestinal: Negative for nausea and vomiting.   Musculoskeletal: Positive for back pain and joint pain (Left hip). Negative for myalgias.   Skin: Negative for rash.   Neurological: Positive for headaches. Negative for dizziness, focal weakness and weakness.        Objective:   /66 (BP Location: Right arm, Patient Position: Sitting, BP Cuff Size: Adult)   Pulse 84   Temp 36.3 °C (97.4 °F) (Temporal)   Resp 20   Ht 1.702 m (5' 7\")   Wt 99.3 kg (219 lb)   SpO2 96%   BMI 34.30 kg/m²   Physical Exam  Vitals and nursing note reviewed.   Constitutional:       General: He is not in acute distress.     Appearance: He is well-developed.   HENT:      Head: Normocephalic.        Right Ear: External ear normal.      Left Ear: External ear normal.      Nose: Nose normal.      Mouth/Throat:      Mouth: Mucous membranes are moist.   Eyes:      Extraocular Movements: Extraocular movements intact.      Conjunctiva/sclera: Conjunctivae normal.      Pupils: Pupils are equal, round, and reactive to light.   Cardiovascular:      Rate and Rhythm: Regular rhythm.   Pulmonary:      Effort: Pulmonary effort is normal. No respiratory distress.      Breath sounds: Normal breath sounds.   Abdominal:      General: There is no distension.   Musculoskeletal:         General: Normal range of motion.   Skin:     General: Skin is warm and dry. "   Neurological:      General: No focal deficit present.      Mental Status: He is alert and oriented to person, place, and time. Mental status is at baseline.      Sensory: Sensation is intact.      Motor: Motor function is intact.      Coordination: Romberg sign negative. Coordination abnormal.      Gait: Gait abnormal (Antalgic gait).   Psychiatric:         Judgment: Judgment normal.           Assessment/Plan:   1. Traumatic injury of head, initial encounter    2. Chronic anticoagulation    3. Acute nonintractable headache, unspecified headache type    4. Lumbar back pain    5. Left hip pain        Medical Decision Making/Course:  In the context of the complaint of fall with head injury with amnesia of the event with ataxia and current history of chronic anticoagulation state, emergency department evaluation management is warranted.  The patient and wife deferred EMS ambulance transfer requested transport by private vehicle.  [facility] emergency department was advised.  In the course of preparing for this visit with review of the pertinent past medical history, recent and past clinic visits, current medications, and performing chart, immunization, medical history and medication reconciliation, and in the further course of obtaining the current history pertinent to the clinic visit today, performing an exam and evaluation, ordering and independently evaluating labs, tests, and/or procedures, prescribing any recommended new medications as noted above, providing any pertinent counseling and education and recommending further coordination of care, at least 30 minutes of total time were spent during this encounter.      Discussed close monitoring, return precautions, and supportive measures of maintaining adequate fluid hydration and caloric intake, relative rest and symptom management as needed for pain and/or fever.    Differential diagnosis, natural history, supportive care, and indications for  immediate follow-up discussed.     Advised the patient to follow-up with the primary care physician for recheck, reevaluation, and consideration of further management.    Please note that this dictation was created using voice recognition software. I have worked with consultants from the vendor as well as technical experts from [employer] [employer] to optimize the interface. I have made every reasonable attempt to correct obvious errors, but I expect that there are errors of grammar and possibly content that I did not discover before finalizing the note.

## 2021-08-30 ENCOUNTER — HOSPITAL ENCOUNTER (OUTPATIENT)
Dept: RADIOLOGY | Facility: MEDICAL CENTER | Age: 81
End: 2021-08-30
Attending: FAMILY MEDICINE
Payer: MEDICARE

## 2021-08-30 ENCOUNTER — OFFICE VISIT (OUTPATIENT)
Dept: MEDICAL GROUP | Facility: MEDICAL CENTER | Age: 81
End: 2021-08-30
Payer: MEDICARE

## 2021-08-30 VITALS
OXYGEN SATURATION: 97 % | DIASTOLIC BLOOD PRESSURE: 60 MMHG | BODY MASS INDEX: 33.06 KG/M2 | HEART RATE: 80 BPM | HEIGHT: 67 IN | RESPIRATION RATE: 20 BRPM | SYSTOLIC BLOOD PRESSURE: 100 MMHG | TEMPERATURE: 97 F | WEIGHT: 210.6 LBS

## 2021-08-30 DIAGNOSIS — S09.90XA INJURY OF HEAD, INITIAL ENCOUNTER: ICD-10-CM

## 2021-08-30 DIAGNOSIS — I10 ESSENTIAL HYPERTENSION: ICD-10-CM

## 2021-08-30 DIAGNOSIS — M25.559 HIP PAIN: ICD-10-CM

## 2021-08-30 DIAGNOSIS — E11.49 OTHER DIABETIC NEUROLOGICAL COMPLICATION ASSOCIATED WITH TYPE 2 DIABETES MELLITUS (HCC): ICD-10-CM

## 2021-08-30 DIAGNOSIS — M54.6 ACUTE THORACIC BACK PAIN, UNSPECIFIED BACK PAIN LATERALITY: ICD-10-CM

## 2021-08-30 PROBLEM — M54.9 BACK PAIN: Status: ACTIVE | Noted: 2021-08-30

## 2021-08-30 PROBLEM — E11.40 DIABETIC NEUROPATHY (HCC): Status: ACTIVE | Noted: 2021-08-30

## 2021-08-30 PROCEDURE — 72070 X-RAY EXAM THORAC SPINE 2VWS: CPT

## 2021-08-30 PROCEDURE — 99214 OFFICE O/P EST MOD 30 MIN: CPT | Performed by: FAMILY MEDICINE

## 2021-08-30 PROCEDURE — 73522 X-RAY EXAM HIPS BI 3-4 VIEWS: CPT

## 2021-08-30 PROCEDURE — 70450 CT HEAD/BRAIN W/O DYE: CPT | Mod: ME

## 2021-08-30 RX ORDER — LISINOPRIL 20 MG/1
20 TABLET ORAL DAILY
Qty: 90 TABLET | Refills: 1 | Status: SHIPPED | OUTPATIENT
Start: 2021-08-30 | End: 2022-01-12

## 2021-08-30 ASSESSMENT — FIBROSIS 4 INDEX: FIB4 SCORE: 1.41

## 2021-08-30 NOTE — ASSESSMENT & PLAN NOTE
Several weeks ago the patient fell in the bathtub and hit the right frontal region of his head.  He did not lose consciousness.  He is on Xarelto.  I sent him to the urgent care and the urgent care physician sent him to the ER but he did not go.

## 2021-08-30 NOTE — ASSESSMENT & PLAN NOTE
Several week ago the patient fell in the bath and hit his thoracic region.  He describes longstanding back pain.

## 2021-08-30 NOTE — ASSESSMENT & PLAN NOTE
Several weeks ago the patient fell onto his left hip in the bathtub.  Ever since then, he describes left lateral hip soreness.

## 2021-08-30 NOTE — ASSESSMENT & PLAN NOTE
Patient describes a few weeks of a subjective sensation of hot and cold the bilateral feet, worse in the balls of the feet.  He does have diabetes.

## 2021-08-30 NOTE — PROGRESS NOTES
[facility]  Progress Note  Established Patient    Subjective:   Amarjit Khan is a 81 y.o. male here today with a chief complaint of head injury. The patient is accompanied by his wife.     Head injury  Several weeks ago the patient fell in the bathtub and hit the right frontal region of his head.  He did not lose consciousness.  He is on Xarelto.  I sent him to the urgent care and the urgent care physician sent him to the ER but he did not go.    Diabetic neuropathy (HCC)  Patient describes a few weeks of a subjective sensation of hot and cold the bilateral feet, worse in the balls of the feet.  He does have diabetes.    HTN (hypertension)  A bit low, describes some nausea which may be related to polypharmacy.     Hip pain  Several weeks ago the patient fell onto his left hip in the bathtub.  Ever since then, he describes left lateral hip soreness.    Back pain  Several week ago the patient fell in the bath and hit his thoracic region.  He describes longstanding back pain.      Current Outpatient Medications on File Prior to Visit   Medication Sig Dispense Refill   • Empagliflozin (JARDIANCE) 10 MG Tab Take 1 tablet by mouth every day. 100 tablet 4   • DULoxetine (CYMBALTA) 60 MG Cap DR Particles delayed-release capsule Take 1 capsule by mouth every day. 100 capsule 4   • metoprolol SR (TOPROL XL) 25 MG TABLET SR 24 HR Take 0.5 Tablets by mouth 2 times a day. 100 tablet 4   • atorvastatin (LIPITOR) 80 MG tablet Take 0.5 Tablets by mouth every day. 50 tablet 4   • fluorouracil (EFUDEX) 5 % cream Apply a thin layer to arms BID x 3 weeks 40 g 0   • XARELTO 20 MG Tab tablet Take 20 mg by mouth with dinner.     • zonisamide (ZONEGRAN) 25 MG capsule Take 25 mg by mouth every day. Take 1 capsule by mouth two times a day     • tramadol (ULTRAM) 50 MG Tab Take 50 mg by mouth every four hours as needed.     • HYDROmorphone (DILAUDID) 2 MG Tab Take 1 mg by mouth 2 times a day as needed for Severe Pain.  "Alternates 1 tab Q 2 day and 2 tab Q2 day     • aspirin (ASA) 81 MG Chew Tab chewable tablet Take 1 Tab by mouth every day. 100 Tab 0   • Cholecalciferol (VITAMIN D) 2000 UNIT CAPS Take 2,000 Units by mouth every day.       No current facility-administered medications on file prior to visit.          Objective:     Vitals:    08/30/21 1431   BP: 100/60   BP Location: Left arm   Patient Position: Sitting   BP Cuff Size: Adult long   Pulse: 80   Resp: 20   Temp: 36.1 °C (97 °F)   TempSrc: Temporal   SpO2: 97%   Weight: 95.5 kg (210 lb 9.6 oz)   Height: 1.702 m (5' 7\")       Physical Exam:  General: alert in no apparent distress.   Neuro: Cranial nerves II through XII intact, finger-nose normal, strength 5-5 on bilateral handgrip.  MSK: No tenderness to palpation of the spine.  Some diminished range of motion on the left ALEJANDRO testing.    Monofilament testing with a 10 gram force: sensation intact: decreased on left  Visual Inspection: Feet without maceration, ulcers, fissures.  Pedal pulses: decreased on left      Assessment and Plan:     1. Injury of head, initial encounter  Neurologic exam is normal but the patient sustained a head injury on Xarelto.  Will order CT scan of the head.  - CT-HEAD W/O; Future    2. Acute thoracic back pain, unspecified back pain laterality  - DX-THORACIC SPINE-2 VIEWS; Future    3. Hip pain  - DX-HIP-BILATERAL-WITH PELVIS-2 VIEWS; Future    4. Other diabetic neurological complication associated with type 2 diabetes mellitus (HCC)  Patient has symptoms consistent with diabetic peripheral neuropathy, however, he has both diminished left pedal pulses and pinprick sensation.  Proceed with the following diagnostic testing.  - REFERRAL TO NEURODIAGNOSTICS (EEG,EP,EMG/NCS/DBS)  - US-EXTREMITY ARTERY LOWER BILAT W/PIYUSH (COMBO); Future    5. Essential hypertension  We will reduce blood pressure regimen to lisinopril alone with 1 week follow-up labs.  - lisinopril (PRINIVIL) 20 MG Tab; Take 1 " Tablet by mouth every day.  Dispense: 90 Tablet; Refill: 1  - Basic Metabolic Panel; Future        Followup: Return in about 6 weeks (around 10/11/2021), or if symptoms worsen or fail to improve.

## 2021-08-31 ENCOUNTER — APPOINTMENT (RX ONLY)
Dept: URBAN - METROPOLITAN AREA CLINIC 22 | Facility: CLINIC | Age: 81
Setting detail: DERMATOLOGY
End: 2021-08-31

## 2021-08-31 DIAGNOSIS — D18.0 HEMANGIOMA: ICD-10-CM

## 2021-08-31 DIAGNOSIS — L82.1 OTHER SEBORRHEIC KERATOSIS: ICD-10-CM

## 2021-08-31 DIAGNOSIS — L57.0 ACTINIC KERATOSIS: ICD-10-CM

## 2021-08-31 DIAGNOSIS — Z71.89 OTHER SPECIFIED COUNSELING: ICD-10-CM

## 2021-08-31 DIAGNOSIS — L85.3 XEROSIS CUTIS: ICD-10-CM

## 2021-08-31 DIAGNOSIS — D22 MELANOCYTIC NEVI: ICD-10-CM

## 2021-08-31 DIAGNOSIS — L81.4 OTHER MELANIN HYPERPIGMENTATION: ICD-10-CM

## 2021-08-31 PROBLEM — D18.01 HEMANGIOMA OF SKIN AND SUBCUTANEOUS TISSUE: Status: ACTIVE | Noted: 2021-08-31

## 2021-08-31 PROBLEM — D22.9 MELANOCYTIC NEVI, UNSPECIFIED: Status: ACTIVE | Noted: 2021-08-31

## 2021-08-31 PROCEDURE — ? COUNSELING

## 2021-08-31 PROCEDURE — ? LIQUID NITROGEN

## 2021-08-31 PROCEDURE — 17000 DESTRUCT PREMALG LESION: CPT

## 2021-08-31 PROCEDURE — 17003 DESTRUCT PREMALG LES 2-14: CPT

## 2021-08-31 PROCEDURE — 99213 OFFICE O/P EST LOW 20 MIN: CPT | Mod: 25

## 2021-08-31 ASSESSMENT — LOCATION DETAILED DESCRIPTION DERM
LOCATION DETAILED: RIGHT SUPERIOR CENTRAL MALAR CHEEK
LOCATION DETAILED: RIGHT PROXIMAL PRETIBIAL REGION
LOCATION DETAILED: NASAL SUPRATIP
LOCATION DETAILED: LEFT SUPERIOR HELIX
LOCATION DETAILED: LEFT DISTAL PRETIBIAL REGION
LOCATION DETAILED: RIGHT CENTRAL MALAR CHEEK
LOCATION DETAILED: RIGHT DISTAL DORSAL FOREARM
LOCATION DETAILED: LEFT INFERIOR FOREHEAD
LOCATION DETAILED: LEFT DISTAL DORSAL FOREARM
LOCATION DETAILED: RIGHT SUPERIOR LATERAL MALAR CHEEK
LOCATION DETAILED: NASAL DORSUM
LOCATION DETAILED: RIGHT POSTERIOR SHOULDER

## 2021-08-31 ASSESSMENT — LOCATION ZONE DERM
LOCATION ZONE: ARM
LOCATION ZONE: FACE
LOCATION ZONE: EAR
LOCATION ZONE: NOSE
LOCATION ZONE: LEG

## 2021-08-31 ASSESSMENT — LOCATION SIMPLE DESCRIPTION DERM
LOCATION SIMPLE: RIGHT SHOULDER
LOCATION SIMPLE: LEFT EAR
LOCATION SIMPLE: LEFT FOREARM
LOCATION SIMPLE: LEFT PRETIBIAL REGION
LOCATION SIMPLE: RIGHT CHEEK
LOCATION SIMPLE: RIGHT FOREARM
LOCATION SIMPLE: RIGHT PRETIBIAL REGION
LOCATION SIMPLE: LEFT FOREHEAD
LOCATION SIMPLE: NOSE

## 2021-08-31 NOTE — PROCEDURE: LIQUID NITROGEN
Render Note In Bullet Format When Appropriate: No
Number Of Freeze-Thaw Cycles: 1 freeze-thaw cycle
Detail Level: Detailed
Consent: The patient's consent was obtained including but not limited to risks of crusting, scabbing, blistering, scarring, darker or lighter pigmentary change, recurrence, incomplete removal and infection.
Render Post-Care Instructions In Note?: yes
Duration Of Freeze Thaw-Cycle (Seconds): 5
Post-Care Instructions: I reviewed with the patient in detail post-care instructions. Patient is to wear sunprotection, and avoid picking at any of the treated lesions. Pt may apply Vaseline to crusted or scabbing areas.

## 2021-09-06 ENCOUNTER — HOSPITAL ENCOUNTER (OUTPATIENT)
Dept: RADIOLOGY | Facility: MEDICAL CENTER | Age: 81
End: 2021-09-06
Attending: FAMILY MEDICINE
Payer: MEDICARE

## 2021-09-06 DIAGNOSIS — E11.49 OTHER DIABETIC NEUROLOGICAL COMPLICATION ASSOCIATED WITH TYPE 2 DIABETES MELLITUS (HCC): ICD-10-CM

## 2021-09-06 PROCEDURE — 93922 UPR/L XTREMITY ART 2 LEVELS: CPT

## 2021-09-06 PROCEDURE — 93922 UPR/L XTREMITY ART 2 LEVELS: CPT | Mod: 26,GZ | Performed by: INTERNAL MEDICINE

## 2021-10-11 ENCOUNTER — OFFICE VISIT (OUTPATIENT)
Dept: MEDICAL GROUP | Facility: MEDICAL CENTER | Age: 81
End: 2021-10-11
Payer: MEDICARE

## 2021-10-11 VITALS
RESPIRATION RATE: 20 BRPM | DIASTOLIC BLOOD PRESSURE: 56 MMHG | BODY MASS INDEX: 33.27 KG/M2 | SYSTOLIC BLOOD PRESSURE: 118 MMHG | HEIGHT: 67 IN | OXYGEN SATURATION: 95 % | TEMPERATURE: 97.5 F | HEART RATE: 69 BPM | WEIGHT: 212 LBS

## 2021-10-11 DIAGNOSIS — I10 PRIMARY HYPERTENSION: ICD-10-CM

## 2021-10-11 DIAGNOSIS — E11.49 OTHER DIABETIC NEUROLOGICAL COMPLICATION ASSOCIATED WITH TYPE 2 DIABETES MELLITUS (HCC): ICD-10-CM

## 2021-10-11 DIAGNOSIS — Z12.11 COLON CANCER SCREENING: ICD-10-CM

## 2021-10-11 DIAGNOSIS — Z12.12 SCREENING FOR COLORECTAL CANCER: ICD-10-CM

## 2021-10-11 DIAGNOSIS — Z12.11 SCREENING FOR COLORECTAL CANCER: ICD-10-CM

## 2021-10-11 DIAGNOSIS — E11.9 TYPE 2 DIABETES MELLITUS WITHOUT COMPLICATION, WITHOUT LONG-TERM CURRENT USE OF INSULIN (HCC): ICD-10-CM

## 2021-10-11 DIAGNOSIS — Z23 NEED FOR VACCINATION: ICD-10-CM

## 2021-10-11 DIAGNOSIS — E78.5 DYSLIPIDEMIA: ICD-10-CM

## 2021-10-11 LAB
HBA1C MFR BLD: 6.2 % (ref 0–5.6)
INT CON NEG: NEGATIVE
INT CON POS: POSITIVE

## 2021-10-11 PROCEDURE — 90472 IMMUNIZATION ADMIN EACH ADD: CPT | Performed by: FAMILY MEDICINE

## 2021-10-11 PROCEDURE — 90662 IIV NO PRSV INCREASED AG IM: CPT | Performed by: FAMILY MEDICINE

## 2021-10-11 PROCEDURE — G0008 ADMIN INFLUENZA VIRUS VAC: HCPCS | Performed by: FAMILY MEDICINE

## 2021-10-11 PROCEDURE — 90750 HZV VACC RECOMBINANT IM: CPT | Performed by: FAMILY MEDICINE

## 2021-10-11 PROCEDURE — 99214 OFFICE O/P EST MOD 30 MIN: CPT | Mod: 25 | Performed by: FAMILY MEDICINE

## 2021-10-11 PROCEDURE — 83036 HEMOGLOBIN GLYCOSYLATED A1C: CPT | Performed by: FAMILY MEDICINE

## 2021-10-11 RX ORDER — LANCETS 30 GAUGE
EACH MISCELLANEOUS
Qty: 100 EACH | Refills: 3 | Status: SHIPPED | OUTPATIENT
Start: 2021-10-11 | End: 2022-06-20

## 2021-10-11 ASSESSMENT — FIBROSIS 4 INDEX: FIB4 SCORE: 1.41

## 2021-10-11 ASSESSMENT — PATIENT HEALTH QUESTIONNAIRE - PHQ9: CLINICAL INTERPRETATION OF PHQ2 SCORE: 0

## 2021-10-11 NOTE — PROGRESS NOTES
[facility]  Progress Note  Established Patient    Subjective:   Amarjit Khan is a 81 y.o. male here today with a chief complaint of diabetes. The patient is accompanied by his wife.     Dyslipidemia  Controlled with atorvastatin.    HTN (hypertension)  At goal.    Type 2 diabetes mellitus without complication, without long-term current use of insulin (HCC)  Under excellent control on Jardiance.  The patient is also on atorvastatin, lisinopril, baby aspirin.    Diabetic neuropathy (HCC)  At the last visit the patient described a few weeks of a subjective sensation of hot and cold the bilateral feet, worse in the balls of the feet.  He does have diabetes.  I sent him for an EMG but he did not get this done.      Current Outpatient Medications on File Prior to Visit   Medication Sig Dispense Refill   • lisinopril (PRINIVIL) 20 MG Tab Take 1 Tablet by mouth every day. 90 Tablet 1   • Empagliflozin (JARDIANCE) 10 MG Tab Take 1 tablet by mouth every day. 100 tablet 4   • DULoxetine (CYMBALTA) 60 MG Cap DR Particles delayed-release capsule Take 1 capsule by mouth every day. 100 capsule 4   • metoprolol SR (TOPROL XL) 25 MG TABLET SR 24 HR Take 0.5 Tablets by mouth 2 times a day. 100 tablet 4   • atorvastatin (LIPITOR) 80 MG tablet Take 0.5 Tablets by mouth every day. 50 tablet 4   • fluorouracil (EFUDEX) 5 % cream Apply a thin layer to arms BID x 3 weeks 40 g 0   • XARELTO 20 MG Tab tablet Take 20 mg by mouth with dinner.     • zonisamide (ZONEGRAN) 25 MG capsule Take 25 mg by mouth every day. Take 1 capsule by mouth two times a day     • tramadol (ULTRAM) 50 MG Tab Take 50 mg by mouth every four hours as needed.     • HYDROmorphone (DILAUDID) 2 MG Tab Take 1 mg by mouth 2 times a day as needed for Severe Pain. Alternates 1 tab Q 2 day and 2 tab Q2 day     • aspirin (ASA) 81 MG Chew Tab chewable tablet Take 1 Tab by mouth every day. 100 Tab 0   • Cholecalciferol (VITAMIN D) 2000 UNIT CAPS Take 2,000 Units by  "mouth every day.       No current facility-administered medications on file prior to visit.          Objective:     Vitals:    10/11/21 1302   BP: 118/56   BP Location: Left arm   Patient Position: Sitting   BP Cuff Size: Adult long   Pulse: 69   Resp: 20   Temp: 36.4 °C (97.5 °F)   TempSrc: Temporal   SpO2: 95%   Weight: 96.2 kg (212 lb)   Height: 1.702 m (5' 7\")       Physical Exam:  General: alert in no apparent distress.         Assessment and Plan:     1. Type 2 diabetes mellitus without complication, without long-term current use of insulin (HCC)  -Continue current regimen.  - POCT A1C  - CBC WITH DIFFERENTIAL; Future  - Comp Metabolic Panel; Future  - Lipid Profile; Future  - MICROALBUMIN CREAT RATIO URINE; Future  - Blood Glucose Meter Kit; Test blood sugar as recommended by provider. Blood glucose monitoring kit per insurance formulary.  Dispense: 1 Kit; Refill: 0  - Blood Glucose Test Strips; Use one test strip to test blood sugar once daily.  Dispense: 100 Strip; Refill: 3  - Lancets; Use one lancet to test blood sugar once daily .  Dispense: 100 Each; Refill: 3    2. Need for vaccination  - Influenza Vaccine, High Dose (65+ Only)  - Shingrix Vaccine    3. Screening for colorectal cancer  - see below.     4. Dyslipidemia  - Continue statin.  - Lipid Profile; Future    5. Primary hypertension  -Continue current regimen.  - Comp Metabolic Panel; Future    6. Other diabetic neurological complication associated with type 2 diabetes mellitus (HCC)  - REFERRAL TO NEURODIAGNOSTICS (EEG,EP,EMG/NCS/DBS)    7. Colon cancer screening  Has history of colon polyps.  - REFERRAL TO GASTROENTEROLOGY        Followup: Return in about 4 months (around 2/11/2022), or if symptoms worsen or fail to improve.         "

## 2021-10-11 NOTE — ASSESSMENT & PLAN NOTE
At the last visit the patient described a few weeks of a subjective sensation of hot and cold the bilateral feet, worse in the balls of the feet.  He does have diabetes.  I sent him for an EMG but he did not get this done.

## 2021-10-11 NOTE — ASSESSMENT & PLAN NOTE
Under excellent control on Jardiance.  The patient is also on atorvastatin, lisinopril, baby aspirin.

## 2021-10-18 ENCOUNTER — HOSPITAL ENCOUNTER (OUTPATIENT)
Dept: RADIOLOGY | Facility: MEDICAL CENTER | Age: 81
End: 2021-10-18
Attending: ANESTHESIOLOGY
Payer: MEDICARE

## 2021-10-18 ENCOUNTER — PRE-ADMISSION TESTING (OUTPATIENT)
Dept: ADMISSIONS | Facility: MEDICAL CENTER | Age: 81
End: 2021-10-18
Attending: ANESTHESIOLOGY
Payer: MEDICARE

## 2021-10-18 DIAGNOSIS — Z01.811 PRE-OPERATIVE RESPIRATORY EXAMINATION: ICD-10-CM

## 2021-10-18 DIAGNOSIS — Z01.810 PRE-OPERATIVE CARDIOVASCULAR EXAMINATION: ICD-10-CM

## 2021-10-18 DIAGNOSIS — Z01.812 PRE-OPERATIVE LABORATORY EXAMINATION: ICD-10-CM

## 2021-10-18 LAB
ANION GAP SERPL CALC-SCNC: 11 MMOL/L (ref 7–16)
APPEARANCE UR: CLEAR
APTT PPP: 52.2 SEC (ref 24.7–36)
BASOPHILS # BLD AUTO: 1.2 % (ref 0–1.8)
BASOPHILS # BLD: 0.1 K/UL (ref 0–0.12)
BILIRUB UR QL STRIP.AUTO: NEGATIVE
BUN SERPL-MCNC: 15 MG/DL (ref 8–22)
CALCIUM SERPL-MCNC: 10 MG/DL (ref 8.5–10.5)
CHLORIDE SERPL-SCNC: 105 MMOL/L (ref 96–112)
CO2 SERPL-SCNC: 24 MMOL/L (ref 20–33)
COLOR UR: YELLOW
CREAT SERPL-MCNC: 1.06 MG/DL (ref 0.5–1.4)
EKG IMPRESSION: NORMAL
EOSINOPHIL # BLD AUTO: 0.44 K/UL (ref 0–0.51)
EOSINOPHIL NFR BLD: 5.5 % (ref 0–6.9)
ERYTHROCYTE [DISTWIDTH] IN BLOOD BY AUTOMATED COUNT: 54 FL (ref 35.9–50)
GLUCOSE SERPL-MCNC: 126 MG/DL (ref 65–99)
GLUCOSE UR STRIP.AUTO-MCNC: >=1000 MG/DL
HCT VFR BLD AUTO: 44.9 % (ref 42–52)
HGB BLD-MCNC: 14.7 G/DL (ref 14–18)
IMM GRANULOCYTES # BLD AUTO: 0.03 K/UL (ref 0–0.11)
IMM GRANULOCYTES NFR BLD AUTO: 0.4 % (ref 0–0.9)
INR PPP: 1.68 (ref 0.87–1.13)
KETONES UR STRIP.AUTO-MCNC: NEGATIVE MG/DL
LEUKOCYTE ESTERASE UR QL STRIP.AUTO: NEGATIVE
LYMPHOCYTES # BLD AUTO: 2.15 K/UL (ref 1–4.8)
LYMPHOCYTES NFR BLD: 26.8 % (ref 22–41)
MCH RBC QN AUTO: 30.7 PG (ref 27–33)
MCHC RBC AUTO-ENTMCNC: 32.7 G/DL (ref 33.7–35.3)
MCV RBC AUTO: 93.7 FL (ref 81.4–97.8)
MICRO URNS: ABNORMAL
MONOCYTES # BLD AUTO: 0.49 K/UL (ref 0–0.85)
MONOCYTES NFR BLD AUTO: 6.1 % (ref 0–13.4)
NEUTROPHILS # BLD AUTO: 4.82 K/UL (ref 1.82–7.42)
NEUTROPHILS NFR BLD: 60 % (ref 44–72)
NITRITE UR QL STRIP.AUTO: NEGATIVE
NRBC # BLD AUTO: 0 K/UL
NRBC BLD-RTO: 0 /100 WBC
PH UR STRIP.AUTO: 5.5 [PH] (ref 5–8)
PLATELET # BLD AUTO: 229 K/UL (ref 164–446)
PMV BLD AUTO: 11.1 FL (ref 9–12.9)
POTASSIUM SERPL-SCNC: 4.3 MMOL/L (ref 3.6–5.5)
PROT UR QL STRIP: NEGATIVE MG/DL
PROTHROMBIN TIME: 19.3 SEC (ref 12–14.6)
RBC # BLD AUTO: 4.79 M/UL (ref 4.7–6.1)
RBC UR QL AUTO: NEGATIVE
SODIUM SERPL-SCNC: 140 MMOL/L (ref 135–145)
SP GR UR STRIP.AUTO: 1.03
UROBILINOGEN UR STRIP.AUTO-MCNC: 0.2 MG/DL
WBC # BLD AUTO: 8 K/UL (ref 4.8–10.8)

## 2021-10-18 PROCEDURE — 85730 THROMBOPLASTIN TIME PARTIAL: CPT

## 2021-10-18 PROCEDURE — 93010 ELECTROCARDIOGRAM REPORT: CPT | Performed by: INTERNAL MEDICINE

## 2021-10-18 PROCEDURE — 93005 ELECTROCARDIOGRAM TRACING: CPT

## 2021-10-18 PROCEDURE — 80048 BASIC METABOLIC PNL TOTAL CA: CPT

## 2021-10-18 PROCEDURE — 81003 URINALYSIS AUTO W/O SCOPE: CPT

## 2021-10-18 PROCEDURE — 36415 COLL VENOUS BLD VENIPUNCTURE: CPT

## 2021-10-18 PROCEDURE — 85025 COMPLETE CBC W/AUTO DIFF WBC: CPT

## 2021-10-18 PROCEDURE — 85610 PROTHROMBIN TIME: CPT

## 2021-10-18 PROCEDURE — 71046 X-RAY EXAM CHEST 2 VIEWS: CPT

## 2021-10-18 ASSESSMENT — FIBROSIS 4 INDEX: FIB4 SCORE: 1.41

## 2021-10-18 NOTE — OR NURSING
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  Patient was provided with phone number of [facility] and strongly encouraged to call them to discuss options, patient agreed.  Also, anesthesia to be notified through today's daily anesthesia notification form via fax.

## 2021-10-25 ENCOUNTER — OFFICE VISIT (OUTPATIENT)
Dept: SLEEP MEDICINE | Facility: MEDICAL CENTER | Age: 81
End: 2021-10-25
Payer: MEDICARE

## 2021-10-25 VITALS
SYSTOLIC BLOOD PRESSURE: 128 MMHG | BODY MASS INDEX: 32.8 KG/M2 | HEIGHT: 67 IN | WEIGHT: 209 LBS | OXYGEN SATURATION: 96 % | HEART RATE: 75 BPM | DIASTOLIC BLOOD PRESSURE: 76 MMHG

## 2021-10-25 DIAGNOSIS — G47.33 OBSTRUCTIVE SLEEP APNEA SYNDROME: ICD-10-CM

## 2021-10-25 DIAGNOSIS — G47.10 HYPERSOMNIA: ICD-10-CM

## 2021-10-25 DIAGNOSIS — F51.04 CHRONIC INSOMNIA: ICD-10-CM

## 2021-10-25 DIAGNOSIS — I48.0 PAROXYSMAL ATRIAL FIBRILLATION (HCC): ICD-10-CM

## 2021-10-25 DIAGNOSIS — Z79.01 ANTICOAGULATED: ICD-10-CM

## 2021-10-25 DIAGNOSIS — I10 PRIMARY HYPERTENSION: ICD-10-CM

## 2021-10-25 PROCEDURE — 99213 OFFICE O/P EST LOW 20 MIN: CPT | Performed by: NURSE PRACTITIONER

## 2021-10-25 RX ORDER — BLOOD-GLUCOSE METER
1 KIT MISCELLANEOUS DAILY
COMMUNITY
Start: 2021-10-11 | End: 2022-06-20

## 2021-10-25 ASSESSMENT — FIBROSIS 4 INDEX: FIB4 SCORE: 1.08

## 2021-10-25 NOTE — PATIENT INSTRUCTIONS
May try Kinga Red Chin strap to help with mask leak, may look on [employer] for it.       May try REM Fresh to hep with sleep. This is over the counter.       Obesity, Adult  Obesity is the condition of having too much total body fat. Being overweight or obese means that your weight is greater than what is considered healthy for your body size. Obesity is determined by a measurement called BMI. BMI is an estimate of body fat and is calculated from height and weight. For adults, a BMI of 30 or higher is considered obese.  Obesity can lead to other health concerns and major illnesses, including:  · Stroke.  · Coronary artery disease (CAD).  · Type 2 diabetes.  · Some types of cancer, including cancers of the colon, breast, uterus, and gallbladder.  · Osteoarthritis.  · High blood pressure (hypertension).  · High cholesterol.  · Sleep apnea.  · Gallbladder stones.  · Infertility problems.  What are the causes?  Common causes of this condition include:  · Eating daily meals that are high in calories, sugar, and fat.  · Being born with genes that may make you more likely to become obese.  · Having a medical condition that causes obesity, including:  ? Hypothyroidism.  ? Polycystic ovarian syndrome (PCOS).  ? Binge-eating disorder.  ? Cushing syndrome.  · Taking certain medicines, such as steroids, antidepressants, and seizure medicines.  · Not being physically active (sedentary lifestyle).  · Not getting enough sleep.  · Drinking high amounts of sugar-sweetened beverages, such as soft drinks.  What increases the risk?  The following factors may make you more likely to develop this condition:  · Having a family history of obesity.  · Being a woman of  descent.  · Being a man of  descent.  · Living in an area with limited access to:  ? [place], recreation centers, or sidewalks.  ? Healthy food choices, such as grocery stores and [employer] markets.  What are the signs or symptoms?  The main sign of this  condition is having too much body fat.  How is this diagnosed?  This condition is diagnosed based on:  · Your BMI. If you are an adult with a BMI of 30 or higher, you are considered obese.  · Your waist circumference. This measures the distance around your waistline.  · Your skinfold thickness. Your health care provider may gently pinch a fold of your skin and measure it.  You may have other tests to check for underlying conditions.  How is this treated?  Treatment for this condition often includes changing your lifestyle. Treatment may include some or all of the following:  · Dietary changes. This may include developing a healthy meal plan.  · Regular physical activity. This may include activity that causes your heart to beat faster (aerobic exercise) and strength training. Work with your health care provider to design an exercise program that works for you.  · Medicine to help you lose weight if you are unable to lose 1 pound a week after 6 weeks of healthy eating and more physical activity.  · Treating conditions that cause the obesity (underlying conditions).  · Surgery. Surgical options may include gastric banding and gastric bypass. Surgery may be done if:  ? Other treatments have not helped to improve your condition.  ? You have a BMI of 40 or higher.  ? You have life-threatening health problems related to obesity.  Follow these instructions at home:  Eating and drinking    · Follow recommendations from your health care provider about what you eat and drink. Your health care provider may advise you to:  ? Limit fast food, sweets, and processed snack foods.  ? Choose low-fat options, such as low-fat milk instead of whole milk.  ? Eat 5 or more servings of fruits or vegetables every day.  ? Eat at home more often. This gives you more control over what you eat.  ? Choose healthy foods when you eat out.  ? Learn to read food labels. This will help you understand how much food is considered 1 serving.  ? Learn  what a healthy serving size is.  ? Keep low-fat snacks available.  ? Limit sugary drinks, such as soda, fruit juice, sweetened iced tea, and flavored milk.  · Drink enough water to keep your urine pale yellow.  · Do not follow a fad diet. Fad diets can be unhealthy and even dangerous.  Physical activity  · Exercise regularly, as told by your health care provider.  ? Most adults should get up to 150 minutes of moderate-intensity exercise every week.  ? Ask your health care provider what types of exercise are safe for you and how often you should exercise.  · Warm up and stretch before being active.  · Cool down and stretch after being active.  · Rest between periods of activity.  Lifestyle  · Work with your health care provider and a dietitian to set a weight-loss goal that is healthy and reasonable for you.  · Limit your screen time.  · Find ways to reward yourself that do not involve food.  · Do not drink alcohol if:  ? Your health care provider tells you not to drink.  ? You are pregnant, may be pregnant, or are planning to become pregnant.  · If you drink alcohol:  ? Limit how much you use to:  § 0-1 drink a day for women.  § 0-2 drinks a day for men.  ? Be aware of how much alcohol is in your drink. In the [place] one drink equals one 12 oz bottle of beer (355 mL), one 5 oz glass of wine (148 mL), or one 1½ oz glass of hard liquor (44 mL).  General instructions  · Keep a weight-loss journal to keep track of the food you eat and how much exercise you get.  · Take over-the-counter and prescription medicines only as told by your health care provider.  · Take vitamins and supplements only as told by your health care provider.  · Consider joining a support group. Your health care provider may be able to recommend a support group.  · Keep all follow-up visits as told by your health care provider. This is important.  Contact a health care provider if:  · You are unable to meet your weight loss goal after 6 weeks of  dietary and lifestyle changes.  Get help right away if you are having:  · Trouble breathing.  · Suicidal thoughts or behaviors.  Summary  · Obesity is the condition of having too much total body fat.  · Being overweight or obese means that your weight is greater than what is considered healthy for your body size.  · Work with your health care provider and a dietitian to set a weight-loss goal that is healthy and reasonable for you.  · Exercise regularly, as told by your health care provider. Ask your health care provider what types of exercise are safe for you and how often you should exercise.  This information is not intended to replace advice given to you by your health care provider. Make sure you discuss any questions you have with your health care provider.  Document Released: 01/25/2006 Document Revised: 08/22/2019 Document Reviewed: 08/22/2019  [employer] Patient Education © 2020 [employer]        Insomnia  Insomnia is a sleep disorder that makes it difficult to fall asleep or stay asleep. Insomnia can cause fatigue, low energy, difficulty concentrating, mood swings, and poor performance at work or school.  There are three different ways to classify insomnia:  · Difficulty falling asleep.  · Difficulty staying asleep.  · Waking up too early in the morning.  Any type of insomnia can be long-term (chronic) or short-term (acute). Both are common. Short-term insomnia usually lasts for three months or less. Chronic insomnia occurs at least three times a week for longer than three months.  What are the causes?  Insomnia may be caused by another condition, situation, or substance, such as:  · Anxiety.  · Certain medicines.  · Gastroesophageal reflux disease (GERD) or other gastrointestinal conditions.  · Asthma or other breathing conditions.  · Restless legs syndrome, sleep apnea, or other sleep disorders.  · Chronic pain.  · Menopause.  · Stroke.  · Abuse of alcohol, tobacco, or illegal drugs.  · Mental health  conditions, such as depression.  · Caffeine.  · Neurological disorders, such as Alzheimer's disease.  · An overactive thyroid (hyperthyroidism).  Sometimes, the cause of insomnia may not be known.  What increases the risk?  Risk factors for insomnia include:  · Gender. Women are affected more often than men.  · Age. Insomnia is more common as you get older.  · Stress.  · Lack of exercise.  · Irregular work schedule or working night shifts.  · Traveling between different time zones.  · Certain medical and mental health conditions.  What are the signs or symptoms?  If you have insomnia, the main symptom is having trouble falling asleep or having trouble staying asleep. This may lead to other symptoms, such as:  · Feeling fatigued or having low energy.  · Feeling nervous about going to sleep.  · Not feeling rested in the morning.  · Having trouble concentrating.  · Feeling irritable, anxious, or depressed.  How is this diagnosed?  This condition may be diagnosed based on:  · Your symptoms and medical history. Your health care provider may ask about:  ? Your sleep habits.  ? Any medical conditions you have.  ? Your mental health.  · A physical exam.  How is this treated?  Treatment for insomnia depends on the cause. Treatment may focus on treating an underlying condition that is causing insomnia. Treatment may also include:  · Medicines to help you sleep.  · Counseling or therapy.  · Lifestyle adjustments to help you sleep better.  Follow these instructions at home:  Eating and drinking    · Limit or avoid alcohol, caffeinated beverages, and cigarettes, especially close to bedtime. These can disrupt your sleep.  · Do not eat a large meal or eat spicy foods right before bedtime. This can lead to digestive discomfort that can make it hard for you to sleep.  Sleep habits    · Keep a sleep diary to help you and your health care provider figure out what could be causing your insomnia. Write down:  ? When you sleep.  ? When  you wake up during the night.  ? How well you sleep.  ? How rested you feel the next day.  ? Any side effects of medicines you are taking.  ? What you eat and drink.  · Make your bedroom a dark, comfortable place where it is easy to fall asleep.  ? Put up shades or blackout curtains to block light from outside.  ? Use a white noise machine to block noise.  ? Keep the temperature cool.  · Limit screen use before bedtime. This includes:  ? Watching TV.  ? Using your smartphone, tablet, or computer.  · Stick to a routine that includes going to bed and waking up at the same times every day and night. This can help you fall asleep faster. Consider making a quiet activity, such as reading, part of your nighttime routine.  · Try to avoid taking naps during the day so that you sleep better at night.  · Get out of bed if you are still awake after 15 minutes of trying to sleep. Keep the lights down, but try reading or doing a quiet activity. When you feel sleepy, go back to bed.  General instructions  · Take over-the-counter and prescription medicines only as told by your health care provider.  · Exercise regularly, as told by your health care provider. Avoid exercise starting several hours before bedtime.  · Use relaxation techniques to manage stress. Ask your health care provider to suggest some techniques that may work well for you. These may include:  ? Breathing exercises.  ? Routines to release muscle tension.  ? Visualizing peaceful scenes.  · Make sure that you drive carefully. Avoid driving if you feel very sleepy.  · Keep all follow-up visits as told by your health care provider. This is important.  Contact a health care provider if:  · You are tired throughout the day.  · You have trouble in your daily routine due to sleepiness.  · You continue to have sleep problems, or your sleep problems get worse.  Get help right away if:  · You have serious thoughts about hurting yourself or someone else.  If you ever feel  like you may hurt yourself or others, or have thoughts about taking your own life, get help right away. You can go to your nearest emergency department or call:  · Your local emergency services (911 in the [place]  · A suicide crisis helpline, such as the National Suicide Prevention Lifeline at 1-722.892.5164. This is open 24 hours a day.  Summary  · Insomnia is a sleep disorder that makes it difficult to fall asleep or stay asleep.  · Insomnia can be long-term (chronic) or short-term (acute).  · Treatment for insomnia depends on the cause. Treatment may focus on treating an underlying condition that is causing insomnia.  · Keep a sleep diary to help you and your health care provider figure out what could be causing your insomnia.  This information is not intended to replace advice given to you by your health care provider. Make sure you discuss any questions you have with your health care provider.  Document Released: 12/15/2001 Document Revised: 11/30/2018 Document Reviewed: 09/27/2018  [employer] Patient Education © 2020 [employer]

## 2021-10-25 NOTE — PROGRESS NOTES
Chief Complaint   Patient presents with   • Follow-Up     Apnea-Last seen 2021       HPI:      Mr. Khan is as 80 y/o male patient who is in today for IVAN f/u. Prior patient of Dr. Walters and Dr. Fermin. PMH includes lipidemia, CAD, hypertension, IVAN, chronic low back pain, hypersomnia, depression, A. 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     Patient received a new [employer] BiPAP ST machine around 2021.  Patient is accompanied by his wife.  He has used so clean.  Compliance report from 21-10/25/21 was downloaded and reviewed with the patient which showed BiPAP ST IPAP/EPAP 20/16 cmH2O, BR 16 bpm, 97% compliance, 6 hrs 36 min use, AHI of 12.2, severe mask leak. He is tolerating the pressure and mask well, although sometimes does feel like the pressure might be a bit high.  He has tried multiple fullface masks and believes he is on his fourth or fifth one.  He tends to breathes with his mouth open because his chin falls below the mask. He goes to bed between 10:30-11 pm and wakes up between 9-10 am.  He does tend to wake up through the night due to chronic back pain.  He no longer takes hydromorphone 2 mg twice daily because it was not effective.  He continues to follow-up with Dr. Goode for pain management and is planning to have the spinal cord stimulator removed on 2021.  He reports morning headache and snoring. Overall, he does not find his sleep refreshing. He has tried ambien, clonazepam, doxepin, haldol, lunesta, remeron, valium and xanax as sleep aids in past however he is allergic to all those medication. He does take regular naps. The naps are usually 60 min long.  He stopped taking Adderall 5 mg every morning because it was not effective and he was also advised by his cardiologist to discontinue due to A. Fib. He follows up with  cardiology at [facility].     Sleep/Card Study History:   Pt was diagnosed with IVAN about 30+ years and has been on CPAP since the. His last SS was 2 years ago, in 2019. with Dr. Pulido's office.      Echo from 10/20/20 indicated left ventricular ejection fraction is visually estimated to be 70%. Normal left ventricular systolic function. Mild concentric left ventricular hypertrophy. The left atrium is normal in size. The aortic valve is not well visualized. No stenosis or regurgitation seen. Structurally normal mitral valve without significant stenosis or regurgitation. Structurally normal tricuspid valve without significant stenosis or regurgitation. Normal pericardium without effusion.    ROS:    Constitutional: Denies fevers, Denies weight changes  Eyes: Denies changes in vision, no eye pain  Ears/Nose/Throat/Mouth: Denies nasal congestion or sore throat   Cardiovascular: Denies chest pain or palpitations   Respiratory: Denies shortness of breath , Denies cough  Gastrointestinal/Hepatic: Denies abdominal pain, nausea, vomiting,   Skin/Breast: Denies rash,   Neurological: Denies headache, confusion,   Psychiatric: denies mood disorder   Sleep: denies snoring       Past Medical History:   Diagnosis Date   • Acute MI (HCC) 1997    cardiologist, Dr. Hernández   • Allergy    • Anesthesia     Vomiting   • Arrhythmia     A-fib   • Arthritis     Spine, hips, knees   • Bowel habit changes     Constipation   • Cancer (HCC)     Skin 2005   • Chickenpox    • Coronary artery disease    • Diabetes (HCC)     Diet and oral medication   • Emphysema of lung (HCC)    • High cholesterol    • Hyperlipidemia    • Hypertension    • Muscle disorder    • Pain     Back and hips   • Personal history of venous thrombosis and embolism 2004    left arm   • Rectal abscess    • Rheumatic fever as child   • Sleep apnea     CPAP   • Snoring    • Unspecified cataract     surgical correction bilateral       Past Surgical History:   Procedure Laterality  Date   • PB TEMPORAL ARTERY LIGATN OR BX Right 9/26/2020    Procedure: BIOPSY, ARTERY, TEMPORAL;  Surgeon: Perry Vale M.D.;  Location: SURGERY [facility];  Service: Vascular   • SPINAL CORD STIMULATOR  7/29/2015    Procedure: SPINAL CORD STIMULATOR PERC PERM;  Surgeon: Margarito Goode M.D.;  Location: SURGERY [facility];  Service:    • PB PERCUT IMPLNT NEUROELECT,EPIDURAL  7/15/2015    Procedure: IMPLANT NEUROSTIM EPI ARRAY - SPINAL CORD STIM TRIAL [employer];  Surgeon: Margarito Goode M.D.;  Location: SURGERY [facility];  Service: Pain Management   • PB PERCUT IMPLNT NEUROELECT,EPIDURAL  7/15/2015    Procedure: IMPLANT NEUROSTIM EPI ARRAY;  Surgeon: Margarito Goode M.D.;  Location: SURGERY [facility];  Service: Pain Management   • HIP ARTHROPLASTY TOTAL Left 2/2015   • CATARACT EXTRACTION WITH IOL Bilateral 2010   • ANAL FISTULOTOMY  8/27/08    Performed by ELLY RAMOS at SURGERY SAME DAY [facility]   • SHOULDER ARTHROTOMY Right 2006   • NERVE ULNAR TRANSFER Right 2004   • ELBOW ARTHROTOMY Left 1998   • KNEE ARTHROSCOPY Left 1995   • KNEE ARTHROTOMY Right 1992   • KNEE ARTHROTOMY Right 1990   • LUMBAR LAMINECTOMY DISKECTOMY  1988   • EYE SURGERY     • STENT PLACEMENT  2005,2010       Family History   Problem Relation Age of Onset   • Diabetes Mother    • Heart Disease Mother    • Hyperlipidemia Mother    • Sleep Apnea Son        Social History     Socioeconomic History   • Marital status:      Spouse name: Not on file   • Number of children: Not on file   • Years of education: Not on file   • Highest education level: Not on file   Occupational History   • Not on file   Tobacco Use   • Smoking status: Never Smoker   • Smokeless tobacco: Never Used   Vaping Use   • Vaping Use: Never used   Substance and Sexual Activity   • Alcohol use: Yes     Comment: Occasional   • Drug use: No   • Sexual activity: Not on file   Other Topics Concern   • Not on file   Social History  Narrative   • Not on file     Social Determinants of Health     Financial Resource Strain:    • Difficulty of Paying Living Expenses:    Food Insecurity:    • Worried About Running Out of Food in the Last Year:    • Ran Out of Food in the Last Year:    Transportation Needs:    • Lack of Transportation (Medical):    • Lack of Transportation (Non-Medical):    Physical Activity:    • Days of Exercise per Week:    • Minutes of Exercise per Session:    Stress:    • Feeling of Stress :    Social Connections:    • Frequency of Communication with Friends and Family:    • Frequency of Social Gatherings with Friends and Family:    • Attends Muslim Services:    • Active Member of Clubs or Organizations:    • Attends Club or Organization Meetings:    • Marital Status:    Intimate Partner Violence:    • Fear of Current or Ex-Partner:    • Emotionally Abused:    • Physically Abused:    • Sexually Abused:        Allergies as of 10/25/2021 - Reviewed 10/25/2021   Allergen Reaction Noted   • Butrans [buprenorphine] Anaphylaxis 11/20/2017   • Codeine Anaphylaxis 08/31/2008   • Darvocet [propoxyphene n-apap] Anaphylaxis 08/31/2008   • Fentanyl Anaphylaxis    • Nucynta [tapentadol] Anaphylaxis and Swelling 07/23/2015   • Percocet [oxycodone-acetaminophen] Anaphylaxis 08/31/2008   • Ambien [zolpidem]  08/21/2018   • Biaxin [clarithromycin]  08/21/2018   • Buspar [buspirone]  08/21/2018   • Celexa  08/21/2018   • Citalopram  11/20/2017   • Clindamycin  08/31/2008   • Clonazepam  08/21/2018   • Demerol  08/31/2008   • Diltiazem  11/20/2017   • Doxepin  08/21/2018   • Effexor [venlafaxine]  11/20/2017   • Erythromycin  11/20/2017   • Haldol [haloperidol]  08/21/2018   • Hydrocodone  08/31/2008   • Lexapro  11/20/2017   • Lunesta  08/21/2018   • Lyrica Swelling 07/23/2015   • Morphine sulfate [bupropion] Hives 07/15/2015   • Remeron [mirtazapine]  08/21/2018   • Serzone [nefazodone]  11/20/2017   • Tape  07/23/2015   • Tizanidine hcl   11/23/2020   • Valium  08/21/2018   • Xanax [alprazolam]  08/21/2018   • Zonisamide  11/23/2020   • Zyprexa  08/21/2018        Vitals:  Vitals:    10/25/21 1251   BP: 128/76   Pulse: 75   SpO2: 96%       Current medications as of today   Current Outpatient Medications   Medication Sig Dispense Refill   • Blood Glucose Meter Kit Test blood sugar as recommended by provider. Blood glucose monitoring kit per insurance formulary. 1 Kit 0   • Blood Glucose Test Strips Use one test strip to test blood sugar once daily. 100 Strip 3   • Lancets Use one lancet to test blood sugar once daily . 100 Each 3   • lisinopril (PRINIVIL) 20 MG Tab Take 1 Tablet by mouth every day. 90 Tablet 1   • Empagliflozin (JARDIANCE) 10 MG Tab Take 1 tablet by mouth every day. 100 tablet 4   • DULoxetine (CYMBALTA) 60 MG Cap DR Particles delayed-release capsule Take 1 capsule by mouth every day. 100 capsule 4   • metoprolol SR (TOPROL XL) 25 MG TABLET SR 24 HR Take 0.5 Tablets by mouth 2 times a day. 100 tablet 4   • atorvastatin (LIPITOR) 80 MG tablet Take 0.5 Tablets by mouth every day. 50 tablet 4   • XARELTO 20 MG Tab tablet Take 20 mg by mouth with dinner.     • HYDROmorphone (DILAUDID) 2 MG Tab Take 1 mg by mouth 2 times a day as needed for Severe Pain. Alternates 1 tab Q 2 day and 2 tab Q2 day     • aspirin (ASA) 81 MG Chew Tab chewable tablet Take 1 Tab by mouth every day. 100 Tab 0   • Cholecalciferol (VITAMIN D) 2000 UNIT CAPS Take 2,000 Units by mouth every day.     • FREESTYLE LITE strip 1 Strip by Other route every day.     • fluorouracil (EFUDEX) 5 % cream Apply a thin layer to arms BID x 3 weeks (Patient not taking: Reported on 10/25/2021) 40 g 0   • zonisamide (ZONEGRAN) 25 MG capsule Take 25 mg by mouth every day. Take 1 capsule by mouth two times a day (Patient not taking: Reported on 10/25/2021)     • tramadol (ULTRAM) 50 MG Tab Take 50 mg by mouth every four hours as needed. (Patient not taking: Reported on 10/25/2021)        No current facility-administered medications for this visit.         Physical Exam: Limited by COVID-19 precautions.  Appearance: Well developed, well nourished, no acute distress  Eyes: PERRL, EOM intact, sclera white, conjunctiva moist  Ears: no lesions or deformities  Hearing: grossly intact  Nose: no lesions or deformities  Respiratory effort: no intercostal retractions or use of accessory muscles  Extremities: no cyanosis or edema  Abdomen: soft   Gait and Station: normal  Digits and nails: no clubbing, cyanosis, petechiae or nodes.  Cranial nerves: grossly intact  Skin: no visible rashes, lesions or ulcers noted  Orientation: Oriented to time, person and place  Mood and affect: mood and affect appropriate, normal interaction with examiner  Judgement: Intact    Assessment:  1. Obstructive sleep apnea syndrome  DME Other   2. Hypersomnia     3. Chronic insomnia     4. Primary hypertension     5. Paroxysmal atrial fibrillation (HCC)     6. Anticoagulated     7. BMI 32.0-32.9,adult  Patient identified as having weight management issue.  Appropriate orders and counseling given.         Plan  Discussed the cardiovascular and neuropsychiatric risks of untreated IVAN; including but not limited to: HTN, DM, MI, ASCVD, CVA, CHF, traffic accidents.     1. Compliance report from 9/26/21-10/25/21 was downloaded and reviewed with the patient which showed BiPAP ST IPAP/EPAP 20/16 cmH2O, BR 16 bpm, 97% compliance, 6 hrs 36 min use, AHI of 12.2, severe mask leak. Continue BiPAP ST. Patient is compliant and benefiting from BiPAP ST therapy for management of IVAN. Advised patient to continue to use the BiPAP ST every night for more than four hours for optimal health benefit and to meet the health insurance 70% compliance guideline.     *DME order ([facility]) for mask (medium Airtouch F20 mask or MOC) and supplies was not needed today. Continue to clean mask and supplies weekly with soap and water, and change supplies per insurance  guidelines.  Patient was advised to discontinue so clean.    *DME order for mask fit was placed today due to severe mask leak.  The patient however may have the best mask already and he was also advised to try a Kinga Red chinstrap which he may purchase on [employer] to help further decrease mask leak.    2. Hypersomnia: It is likely multifactorial due to his chronic condition and medications.  Patient was on dayvigo however the pharmacist recommended against due to his history of CY gene mutation.  He was started on Adderall 5 mg every morning.  It is metabolize using [id number].    This was however discontinued due to ineffectiveness and also per cardiology due to A. fib.  3. Sleep hygiene discussed. Recommend keeping a set sleep/wake schedule. Logging enough hours of sleep. Limiting/Avoiding naps. No caffeine after noon and no heavy meals in the evening.  Chronic insomnia: Advised patient to try over-the-counter REM fresh to help with sleep.  4-6. Continue to f/u with cardiology, Dr. Madden at [facility].  7. Encouraged a healthy diet and light conditioning to help with weight loss and management. If your BMI is 25-29.9 you are overweight. If your BMI is 30 or greater you are obese. To lose weight eat less, move more, or both. Any diet that reduces caloric intake can help with weight loss. Extra weight may reduce your lifespan. Avoid dramatic unsustainable dietary changes that result in the yo-yo effect (down then back up.)  Usually small modifications in diet and exercise are easier to stick with.  8. Follow up with the appropriate healthcare practitioners for all other medical problems.  Continue to follow-up with pain specialist Dr. Goode.  Patient is pending spinal cord stimulator removal on 21.  9. F/u in 6 weeks for IVAN.       MARIA DE JESUS Esparza.      This dictation was created using voice recognition software. The accuracy of the dictation is limited to the abilities of the software. I expect there may be  some errors of grammar and possibly content.

## 2021-10-28 ENCOUNTER — OFFICE VISIT (OUTPATIENT)
Dept: MEDICAL GROUP | Facility: MEDICAL CENTER | Age: 81
End: 2021-10-28
Payer: MEDICARE

## 2021-10-28 VITALS
TEMPERATURE: 97.5 F | WEIGHT: 209.22 LBS | HEART RATE: 77 BPM | OXYGEN SATURATION: 94 % | RESPIRATION RATE: 20 BRPM | HEIGHT: 67 IN | BODY MASS INDEX: 32.84 KG/M2 | SYSTOLIC BLOOD PRESSURE: 114 MMHG | DIASTOLIC BLOOD PRESSURE: 60 MMHG

## 2021-10-28 DIAGNOSIS — R93.89 ABNORMAL CHEST X-RAY: ICD-10-CM

## 2021-10-28 PROCEDURE — 99213 OFFICE O/P EST LOW 20 MIN: CPT | Performed by: FAMILY MEDICINE

## 2021-10-28 ASSESSMENT — FIBROSIS 4 INDEX: FIB4 SCORE: 1.08

## 2021-10-29 ENCOUNTER — HOSPITAL ENCOUNTER (OUTPATIENT)
Dept: RADIOLOGY | Facility: MEDICAL CENTER | Age: 81
End: 2021-10-29
Attending: FAMILY MEDICINE
Payer: MEDICARE

## 2021-10-29 DIAGNOSIS — R93.89 ABNORMAL CHEST X-RAY: ICD-10-CM

## 2021-10-29 PROCEDURE — 71250 CT THORAX DX C-: CPT | Mod: MG

## 2021-10-29 NOTE — ASSESSMENT & PLAN NOTE
Patient comes to see me today because some preoperative testing revealed a right lower lobar opacity in his lung.  The patient denies fever, chills, cough, shortness of breath.  He is a never smoker.

## 2021-10-29 NOTE — PROGRESS NOTES
[facility]  Progress Note  Established Patient    Subjective:   Amarjit Khan is a 81 y.o. male here today with a chief complaint of abnormal CXR. The patient is accompanied by his wife.      During the visit they both expressed concern to me about their many medical issues.  They both denied active suicidal ideation but I provided to them the suicide hotline number and instructed them to call or to go to the ER if they develop any suicidal ideation.    Abnormal chest x-ray  Patient comes to see me today because some preoperative testing revealed a right lower lobar opacity in his lung.  The patient denies fever, chills, cough, shortness of breath.  He is a never smoker.      Current Outpatient Medications on File Prior to Visit   Medication Sig Dispense Refill   • FREESTYLE LITE strip 1 Strip by Other route every day.     • Blood Glucose Meter Kit Test blood sugar as recommended by provider. Blood glucose monitoring kit per insurance formulary. 1 Kit 0   • Blood Glucose Test Strips Use one test strip to test blood sugar once daily. 100 Strip 3   • Lancets Use one lancet to test blood sugar once daily . 100 Each 3   • lisinopril (PRINIVIL) 20 MG Tab Take 1 Tablet by mouth every day. 90 Tablet 1   • Empagliflozin (JARDIANCE) 10 MG Tab Take 1 tablet by mouth every day. 100 tablet 4   • DULoxetine (CYMBALTA) 60 MG Cap DR Particles delayed-release capsule Take 1 capsule by mouth every day. 100 capsule 4   • metoprolol SR (TOPROL XL) 25 MG TABLET SR 24 HR Take 0.5 Tablets by mouth 2 times a day. 100 tablet 4   • atorvastatin (LIPITOR) 80 MG tablet Take 0.5 Tablets by mouth every day. 50 tablet 4   • XARELTO 20 MG Tab tablet Take 20 mg by mouth with dinner.     • zonisamide (ZONEGRAN) 25 MG capsule Take 25 mg by mouth every day. Take 1 capsule by mouth two times a day (Patient not taking: Reported on 10/25/2021)     • tramadol (ULTRAM) 50 MG Tab Take 50 mg by mouth every four hours as needed. (Patient not  "taking: Reported on 10/25/2021)     • HYDROmorphone (DILAUDID) 2 MG Tab Take 1 mg by mouth 2 times a day as needed for Severe Pain. Alternates 1 tab Q 2 day and 2 tab Q2 day     • aspirin (ASA) 81 MG Chew Tab chewable tablet Take 1 Tab by mouth every day. 100 Tab 0   • Cholecalciferol (VITAMIN D) 2000 UNIT CAPS Take 2,000 Units by mouth every day.       No current facility-administered medications on file prior to visit.          Objective:     Vitals:    10/28/21 1636   BP: 114/60   BP Location: Left arm   Patient Position: Sitting   BP Cuff Size: Adult long   Pulse: 77   Resp: 20   Temp: 36.4 °C (97.5 °F)   TempSrc: Temporal   SpO2: 94%   Weight: 94.9 kg (209 lb 3.5 oz)   Height: 1.702 m (5' 7\")       Physical Exam:  General: alert in no apparent distress.   Cardio: RRR.   Resp: CTAB.         Assessment and Plan:     1. Abnormal chest x-ray  Uncertain etiology but in the absence of symptoms, not consistent with pneumonia.  We'll pursue further work-up with CT chest.  - CT-CHEST (THORAX) W/O; Future    11/1/21 Addendum: CT chest didn't show any e/o consolidation, as suggested by 10/18/2021 pre-procedural CXR. The 10/18 CXR findings should not limit moving forward with any necessary procedure. There was radiographic evidence of CAD. The patient does have a h/o CAD and sees cardiology. A cardiology pre-procedural examination may be of value here.     Followup: Return if symptoms worsen or fail to improve.         "

## 2021-11-01 ENCOUNTER — PATIENT MESSAGE (OUTPATIENT)
Dept: MEDICAL GROUP | Facility: MEDICAL CENTER | Age: 81
End: 2021-11-01

## 2021-11-01 ENCOUNTER — ANESTHESIA EVENT (OUTPATIENT)
Dept: SURGERY | Facility: MEDICAL CENTER | Age: 81
End: 2021-11-01
Payer: MEDICARE

## 2021-11-02 ENCOUNTER — APPOINTMENT (OUTPATIENT)
Dept: RADIOLOGY | Facility: MEDICAL CENTER | Age: 81
End: 2021-11-02
Attending: ANESTHESIOLOGY
Payer: MEDICARE

## 2021-11-02 ENCOUNTER — HOSPITAL ENCOUNTER (OUTPATIENT)
Facility: MEDICAL CENTER | Age: 81
End: 2021-11-02
Attending: ANESTHESIOLOGY | Admitting: ANESTHESIOLOGY
Payer: MEDICARE

## 2021-11-02 ENCOUNTER — ANESTHESIA (OUTPATIENT)
Dept: SURGERY | Facility: MEDICAL CENTER | Age: 81
End: 2021-11-02
Payer: MEDICARE

## 2021-11-02 VITALS
HEIGHT: 68 IN | DIASTOLIC BLOOD PRESSURE: 60 MMHG | SYSTOLIC BLOOD PRESSURE: 109 MMHG | RESPIRATION RATE: 16 BRPM | HEART RATE: 68 BPM | TEMPERATURE: 97.1 F | BODY MASS INDEX: 31.71 KG/M2 | WEIGHT: 209.22 LBS | OXYGEN SATURATION: 92 %

## 2021-11-02 LAB
EXTERNAL QUALITY CONTROL: NORMAL
GLUCOSE BLD-MCNC: 149 MG/DL (ref 65–99)
SARS-COV+SARS-COV-2 AG RESP QL IA.RAPID: NEGATIVE

## 2021-11-02 PROCEDURE — 160047 HCHG PACU  - EA ADDL 30 MINS PHASE II: Performed by: ANESTHESIOLOGY

## 2021-11-02 PROCEDURE — 160041 HCHG SURGERY MINUTES - EA ADDL 1 MIN LEVEL 4: Performed by: ANESTHESIOLOGY

## 2021-11-02 PROCEDURE — 160035 HCHG PACU - 1ST 60 MINS PHASE I: Performed by: ANESTHESIOLOGY

## 2021-11-02 PROCEDURE — 501838 HCHG SUTURE GENERAL: Performed by: ANESTHESIOLOGY

## 2021-11-02 PROCEDURE — 87426 SARSCOV CORONAVIRUS AG IA: CPT | Performed by: ANESTHESIOLOGY

## 2021-11-02 PROCEDURE — 160025 RECOVERY II MINUTES (STATS): Performed by: ANESTHESIOLOGY

## 2021-11-02 PROCEDURE — 700105 HCHG RX REV CODE 258: Performed by: ANESTHESIOLOGY

## 2021-11-02 PROCEDURE — 160002 HCHG RECOVERY MINUTES (STAT): Performed by: ANESTHESIOLOGY

## 2021-11-02 PROCEDURE — 160048 HCHG OR STATISTICAL LEVEL 1-5: Performed by: ANESTHESIOLOGY

## 2021-11-02 PROCEDURE — 700111 HCHG RX REV CODE 636 W/ 250 OVERRIDE (IP): Performed by: ANESTHESIOLOGY

## 2021-11-02 PROCEDURE — 82962 GLUCOSE BLOOD TEST: CPT

## 2021-11-02 PROCEDURE — 700101 HCHG RX REV CODE 250: Performed by: ANESTHESIOLOGY

## 2021-11-02 PROCEDURE — 160029 HCHG SURGERY MINUTES - 1ST 30 MINS LEVEL 4: Performed by: ANESTHESIOLOGY

## 2021-11-02 PROCEDURE — 160046 HCHG PACU - 1ST 60 MINS PHASE II: Performed by: ANESTHESIOLOGY

## 2021-11-02 PROCEDURE — 160009 HCHG ANES TIME/MIN: Performed by: ANESTHESIOLOGY

## 2021-11-02 PROCEDURE — 160036 HCHG PACU - EA ADDL 30 MINS PHASE I: Performed by: ANESTHESIOLOGY

## 2021-11-02 RX ORDER — HYDROMORPHONE HYDROCHLORIDE 1 MG/ML
0.4 INJECTION, SOLUTION INTRAMUSCULAR; INTRAVENOUS; SUBCUTANEOUS
Status: DISCONTINUED | OUTPATIENT
Start: 2021-11-02 | End: 2021-11-02 | Stop reason: HOSPADM

## 2021-11-02 RX ORDER — LIDOCAINE HYDROCHLORIDE 20 MG/ML
INJECTION, SOLUTION INFILTRATION; PERINEURAL
Status: DISCONTINUED | OUTPATIENT
Start: 2021-11-02 | End: 2021-11-02 | Stop reason: HOSPADM

## 2021-11-02 RX ORDER — ACETAMINOPHEN 500 MG
1000 TABLET ORAL
COMMUNITY
End: 2022-09-23

## 2021-11-02 RX ORDER — DEXAMETHASONE SODIUM PHOSPHATE 4 MG/ML
INJECTION, SOLUTION INTRA-ARTICULAR; INTRALESIONAL; INTRAMUSCULAR; INTRAVENOUS; SOFT TISSUE PRN
Status: DISCONTINUED | OUTPATIENT
Start: 2021-11-02 | End: 2021-11-02 | Stop reason: SURG

## 2021-11-02 RX ORDER — HALOPERIDOL 5 MG/ML
1 INJECTION INTRAMUSCULAR
Status: DISCONTINUED | OUTPATIENT
Start: 2021-11-02 | End: 2021-11-02 | Stop reason: HOSPADM

## 2021-11-02 RX ORDER — HYDROMORPHONE HYDROCHLORIDE 1 MG/ML
0.1 INJECTION, SOLUTION INTRAMUSCULAR; INTRAVENOUS; SUBCUTANEOUS
Status: DISCONTINUED | OUTPATIENT
Start: 2021-11-02 | End: 2021-11-02 | Stop reason: HOSPADM

## 2021-11-02 RX ORDER — ONDANSETRON 2 MG/ML
4 INJECTION INTRAMUSCULAR; INTRAVENOUS
Status: COMPLETED | OUTPATIENT
Start: 2021-11-02 | End: 2021-11-02

## 2021-11-02 RX ORDER — HYDROMORPHONE HYDROCHLORIDE 1 MG/ML
0.2 INJECTION, SOLUTION INTRAMUSCULAR; INTRAVENOUS; SUBCUTANEOUS
Status: DISCONTINUED | OUTPATIENT
Start: 2021-11-02 | End: 2021-11-02 | Stop reason: HOSPADM

## 2021-11-02 RX ORDER — SODIUM CHLORIDE, SODIUM LACTATE, POTASSIUM CHLORIDE, CALCIUM CHLORIDE 600; 310; 30; 20 MG/100ML; MG/100ML; MG/100ML; MG/100ML
INJECTION, SOLUTION INTRAVENOUS CONTINUOUS
Status: ACTIVE | OUTPATIENT
Start: 2021-11-02 | End: 2021-11-02

## 2021-11-02 RX ORDER — ONDANSETRON 2 MG/ML
4 INJECTION INTRAMUSCULAR; INTRAVENOUS
Status: DISCONTINUED | OUTPATIENT
Start: 2021-11-02 | End: 2021-11-02 | Stop reason: HOSPADM

## 2021-11-02 RX ORDER — CEFAZOLIN SODIUM 1 G/3ML
INJECTION, POWDER, FOR SOLUTION INTRAMUSCULAR; INTRAVENOUS PRN
Status: DISCONTINUED | OUTPATIENT
Start: 2021-11-02 | End: 2021-11-02 | Stop reason: SURG

## 2021-11-02 RX ORDER — ONDANSETRON 2 MG/ML
INJECTION INTRAMUSCULAR; INTRAVENOUS PRN
Status: DISCONTINUED | OUTPATIENT
Start: 2021-11-02 | End: 2021-11-02 | Stop reason: SURG

## 2021-11-02 RX ORDER — BUPIVACAINE HYDROCHLORIDE AND EPINEPHRINE 5; 5 MG/ML; UG/ML
INJECTION, SOLUTION EPIDURAL; INTRACAUDAL; PERINEURAL
Status: DISCONTINUED | OUTPATIENT
Start: 2021-11-02 | End: 2021-11-02 | Stop reason: HOSPADM

## 2021-11-02 RX ORDER — DIPHENHYDRAMINE HYDROCHLORIDE 50 MG/ML
12.5 INJECTION INTRAMUSCULAR; INTRAVENOUS
Status: DISCONTINUED | OUTPATIENT
Start: 2021-11-02 | End: 2021-11-02 | Stop reason: HOSPADM

## 2021-11-02 RX ADMIN — ROCURONIUM BROMIDE 70 MG: 10 INJECTION, SOLUTION INTRAVENOUS at 09:51

## 2021-11-02 RX ADMIN — SODIUM CHLORIDE, POTASSIUM CHLORIDE, SODIUM LACTATE AND CALCIUM CHLORIDE: 600; 310; 30; 20 INJECTION, SOLUTION INTRAVENOUS at 09:09

## 2021-11-02 RX ADMIN — CEFAZOLIN 2 G: 330 INJECTION, POWDER, FOR SOLUTION INTRAMUSCULAR; INTRAVENOUS at 09:51

## 2021-11-02 RX ADMIN — HYDROMORPHONE HYDROCHLORIDE 0.2 MG: 1 INJECTION, SOLUTION INTRAMUSCULAR; INTRAVENOUS; SUBCUTANEOUS at 11:28

## 2021-11-02 RX ADMIN — FENTANYL CITRATE 100 MCG: 50 INJECTION, SOLUTION INTRAMUSCULAR; INTRAVENOUS at 09:51

## 2021-11-02 RX ADMIN — ONDANSETRON 4 MG: 2 INJECTION INTRAMUSCULAR; INTRAVENOUS at 11:45

## 2021-11-02 RX ADMIN — HYDROMORPHONE HYDROCHLORIDE 0.2 MG: 1 INJECTION, SOLUTION INTRAMUSCULAR; INTRAVENOUS; SUBCUTANEOUS at 11:45

## 2021-11-02 RX ADMIN — EPHEDRINE SULFATE 10 MG: 50 INJECTION INTRAMUSCULAR; INTRAVENOUS; SUBCUTANEOUS at 10:17

## 2021-11-02 RX ADMIN — SUGAMMADEX 200 MG: 100 INJECTION, SOLUTION INTRAVENOUS at 10:33

## 2021-11-02 RX ADMIN — PROPOFOL 150 MG: 10 INJECTION, EMULSION INTRAVENOUS at 09:51

## 2021-11-02 RX ADMIN — MIDAZOLAM 2 MG: 1 INJECTION INTRAMUSCULAR; INTRAVENOUS at 09:47

## 2021-11-02 RX ADMIN — ONDANSETRON 4 MG: 2 INJECTION INTRAMUSCULAR; INTRAVENOUS at 10:15

## 2021-11-02 RX ADMIN — DEXAMETHASONE SODIUM PHOSPHATE 4 MG: 4 INJECTION, SOLUTION INTRA-ARTICULAR; INTRALESIONAL; INTRAMUSCULAR; INTRAVENOUS; SOFT TISSUE at 10:15

## 2021-11-02 ASSESSMENT — PAIN DESCRIPTION - PAIN TYPE
TYPE: CHRONIC PAIN

## 2021-11-02 ASSESSMENT — PAIN SCALES - GENERAL: PAIN_LEVEL: 4

## 2021-11-02 ASSESSMENT — FIBROSIS 4 INDEX: FIB4 SCORE: 1.08

## 2021-11-02 NOTE — OR SURGEON
Immediate Post OP Note    Post laminectomy syndrome    Post laminectomy syndrome      Procedure(s):  REMOVAL NEUROSTIMULATOR, PERMANENT, SPINAL CORD - Wound Class: Clean    Surgeon(s):  Margarito Goode M.D.    Anesthesiologist/Type of Anesthesia:  Anesthesiologist: Jay Gonzalez M.D./General    Surgical Staff:  Circulator: Erica Dailey R.N.  Scrub Person: Stormy Pop    Specimens removed if any:  * No specimens in log *    Estimated Blood Loss: min    Findings: None    Complications: None        11/2/2021 10:41 AM Margarito Goode M.D.

## 2021-11-02 NOTE — DISCHARGE INSTRUCTIONS
ACTIVITY: Rest and take it easy for the first 24 hours.  A responsible adult is recommended to remain with you during that time.  It is normal to feel sleepy.  We encourage you to not do anything that requires balance, judgment or coordination.    MILD FLU-LIKE SYMPTOMS ARE NORMAL. YOU MAY EXPERIENCE GENERALIZED MUSCLE ACHES, THROAT IRRITATION, HEADACHE AND/OR SOME NAUSEA.    FOR 24 HOURS DO NOT:  Drive, operate machinery or run household appliances.  Drink beer or alcoholic beverages.   Make important decisions or sign legal documents.    SPECIAL INSTRUCTIONS:  Apply Ice to incision site as need for comfort.  Activity as tolerated    DIET: To avoid nausea, slowly advance diet as tolerated, avoiding spicy or greasy foods for the first day.  Add more substantial food to your diet according to your physician's instructions.  Babies can be fed formula or breast milk as soon as they are hungry.  INCREASE FLUIDS AND FIBER TO AVOID CONSTIPATION.    SURGICAL DRESSING/BATHING: You may begin showering in 72 hours, Avoid submerging in water until cleared by your MD. No baths, hot tub, or pool until you follow-up with your provider.    FOLLOW-UP APPOINTMENT:  A follow-up appointment should be arranged with your doctor in 1-2 Weeks; call to schedule.    You should CALL YOUR PHYSICIAN if you develop:  Fever greater than 101 degrees F.  Pain not relieved by medication, or persistent nausea or vomiting.  Excessive bleeding (blood soaking through dressing) or unexpected drainage from the wound.  Extreme redness or swelling around the incision site, drainage of pus or foul smelling drainage.  Inability to urinate or empty your bladder within 8 hours.  Problems with breathing or chest pain.    You should call 911 if you develop problems with breathing or chest pain.  If you are unable to contact your doctor or surgical center, you should go to the nearest emergency room or urgent care center.      Physician's telephone #:  [phone] Dr. Goode    If any questions arise, call your doctor.  If your doctor is not available, please feel free to call the Surgical Center at [phone]. The Contact Center is open Monday through Friday 7AM to 5PM and may speak to a nurse at [phone], or toll free at [phone].     A registered nurse may call you a few days after your surgery to see how you are doing after your procedure.    MEDICATIONS: Resume taking daily medication.  Take prescribed pain medication with food.  If no medication is prescribed, you may take non-aspirin pain medication if needed.  PAIN MEDICATION CAN BE VERY CONSTIPATING.  Take a stool softener or laxative such as senokot, pericolace, or milk of magnesia if needed.    Last pain medication given at 11:45 AM.    If your physician has prescribed pain medication that includes Acetaminophen (Tylenol), do not take additional Acetaminophen (Tylenol) while taking the prescribed medication.    Depression / Suicide Risk    As you are discharged from this [facility] facility, it is important to learn how to keep safe from harming yourself.    Recognize the warning signs:  · Abrupt changes in personality, positive or negative- including increase in energy   · Giving away possessions  · Change in eating patterns- significant weight changes-  positive or negative  · Change in sleeping patterns- unable to sleep or sleeping all the time   · Unwillingness or inability to communicate  · Depression  · Unusual sadness, discouragement and loneliness  · Talk of wanting to die  · Neglect of personal appearance   · Rebelliousness- reckless behavior  · Withdrawal from people/activities they love  · Confusion- inability to concentrate     If you or a loved one observes any of these behaviors or has concerns about self-harm, here's what you can do:  · Talk about it- your feelings and reasons for harming yourself  · Remove any means that you might use to hurt yourself (examples: pills,  rope, extension cords, firearm)  · Get professional help from the community (Mental Health, Substance Abuse, psychological counseling)  · Do not be alone:Call your Safe Contact- someone whom you trust who will be there for you.  · Call your local CRISIS HOTLINE 119-8430 or 129-586-3005  · Call your local Children's Mobile Crisis Response Team [place] (517) 447-5268 or www.MEDOVENT  · Call the toll free National Suicide Prevention Hotlines   · National Suicide Prevention Lifeline 391-480-HMYL (7400)  · National Hope Line Network 800-SUICIDE (436-6649)

## 2021-11-02 NOTE — ANESTHESIA PREPROCEDURE EVALUATION
Relevant Problems   ANESTHESIA   (positive) Obstructive sleep apnea syndrome      CARDIAC   (positive) CAD (coronary artery disease)   (positive) HTN (hypertension)   (positive) Paroxysmal atrial fibrillation (HCC)      ENDO   (positive) Type 2 diabetes mellitus without complication, without long-term current use of insulin (HCC)       Physical Exam    Airway   Mallampati: II  TM distance: >3 FB  Neck ROM: full       Cardiovascular - normal exam  Rhythm: regular  Rate: normal  (-) murmur     Dental - normal exam           Pulmonary - normal exam  Breath sounds clear to auscultation     Abdominal    Neurological - normal exam                 Anesthesia Plan    ASA 3   ASA physical status 3 criteria: CAD/stents (> 3 months)    Plan - general       Airway plan will be ETT          Induction: intravenous    Postoperative Plan: Postoperative administration of opioids is intended.    Pertinent diagnostic labs and testing reviewed    Informed Consent:    Anesthetic plan and risks discussed with patient.    Use of blood products discussed with: patient whom consented to blood products.

## 2021-11-02 NOTE — OR NURSING
1040: received to PACU via gurney. Sleeping. Respirations even and unlabored.    1050: dressing to right mid back and right lower back CDI.    1115: sips of  Water given. Tolerated wel. Denies pain or nausea    1128: c/o posterior lower neck and bilateral shoulder pain. Pain scale 4/10. Medicated. See MAR.    1145: c/o nausea and pain to right neck and shoulder.Medicated. See MAR.    1200: report called to Sh[facility] DELEON.    1202: transported via gurney to PACU 2 by CNA. Stable. Patient wearing face mask.

## 2021-11-02 NOTE — OR NURSING
1213: Patient arrived from PACU to Phase II. Surgical sites are clean, dry, and intact. Patient is alert and awake. Updated on POC. Patient tolerating PO intake.  1250: Discharge paperwork reviewed with patient and family. Discussed activity, site care, worsening symptoms, and follow-up. Verbalized understanding. No further questions.  1345: Patient is ambulatory with a steady gait. Patient is able to void. Criteria met to discharge patient. PIV removed, tip intact.  1400: Patient escorted out in wheelchair with CNA. Discharge instructions and personal belongings in possession of the patient. Copy of discharge instructions signed and placed in the chart. Patient discharged to home with family.

## 2021-11-02 NOTE — OP REPORT
SCS Percutaneous Explant:  Removal of implanted epidural electrode right side.    Removal of implanted epidural electrode left side.    Removal of neurostimulator generator complex.  ANESTHESIA:General Anesthesia    ASA CLASSIFICATION:III    INTERVAL HISTORY: stim site pain and no relief from scs s/p multiple reprogrammings.    PROCEDURE IN DETAIL:After informed written consent was obtained, intravenous access was placed and the patient was given cefazolin 2 grams IV 30 minutes prior to the procedure. She was then taken to the operating room and placed in the prone position. Following Chloraprep and sterile drape, local infiltration was performed at the T12-L1 interspace with an equal mixture of lidocaine 2% plus bupivacaine 0.5% with epinephrine 1:200,000.    A midline incision was then made down to the underlying fascial layer. Meticulous attention was made to hemostasis. The lead anchors were identified and released using a scalpel to remove sutures. The leads were then pulled out of the epidural space    Next, the right flank pocket was opened using a combination of blunt dissection and electrocautery. The IPG unit was surfaced and the leads were cut from the unit. The entire unit was removed from the patient. Both incision sites were irrigated with copious amounts of saline.    Both incisions were then closed using a combination of 2-0 interrupted vicryl sutures followed by dermabond, which was applied to both incisions. Telfa and Tegaderm over the wound. No complications were encountered. The patient emerged from anesthesia and recovered well. The patient was discharged home in good condition.    FLUORO TIME: none  Assessment / Plan  1. Post-laminectomy syndrome  M96.1: Postlaminectomy syndrome, not elsewhere classified      Return to Office  PREET Tolentino for PM POST OP 30 at [facility] on 11/16/2021 at 08:45 AM  Encounter Sign-Off  Encounter signed-off by Margarito Goode MD,  11/02/2021.  Encounter performed and documented by Margarito Goode MD  Encounter reviewed & signed by Margarito Goode MD on 11/02/2021 at 10:55am

## 2021-11-02 NOTE — ANESTHESIA PROCEDURE NOTES
Airway    Date/Time: 11/2/2021 9:52 AM  Performed by: Jay Gonzalez M.D.  Authorized by: Jay Gonzalez M.D.     Location:  OR  Urgency:  Elective  Indications for Airway Management:  Anesthesia      Spontaneous Ventilation: absent    Sedation Level:  Deep  Preoxygenated: Yes    Patient Position:  Sniffing  Final Airway Type:  Endotracheal airway  Final Endotracheal Airway:  ETT  Cuffed: Yes    Technique Used for Successful ETT Placement:  Video laryngoscopy    Insertion Site:  Oral  Blade Type:  Glide  Laryngoscope Blade/Videolaryngoscope Blade Size:  4  ETT Size (mm):  4.0  Measured from:  Teeth  ETT to Teeth (cm):  21  Placement Verified by: auscultation and capnometry    Cormack-Lehane Classification:  Grade I - full view of glottis  Number of Attempts at Approach:  1

## 2021-12-14 RX ORDER — ATORVASTATIN CALCIUM 80 MG/1
40 TABLET, FILM COATED ORAL DAILY
Qty: 50 TABLET | Refills: 4 | Status: SHIPPED | OUTPATIENT
Start: 2021-12-14 | End: 2022-06-20 | Stop reason: SDUPTHER

## 2022-01-11 DIAGNOSIS — I10 ESSENTIAL HYPERTENSION: ICD-10-CM

## 2022-01-11 NOTE — TELEPHONE ENCOUNTER
Received request via: Pharmacy    Was the patient seen in the last year in this department? Yes    Does the patient have an active prescription (recently filled or refills available) for medication(s) requested? No     Future Appointments       Provider Department Center    2/16/2022 11:00 AM Perry Canchola M.D. [facility] [place]

## 2022-01-12 RX ORDER — LISINOPRIL 20 MG/1
TABLET ORAL
Qty: 90 TABLET | Refills: 3 | Status: SHIPPED | OUTPATIENT
Start: 2022-01-12 | End: 2022-01-14 | Stop reason: SDUPTHER

## 2022-01-14 DIAGNOSIS — I10 ESSENTIAL HYPERTENSION: ICD-10-CM

## 2022-01-16 RX ORDER — LISINOPRIL 20 MG/1
20 TABLET ORAL DAILY
Qty: 100 TABLET | Refills: 3 | Status: SHIPPED | OUTPATIENT
Start: 2022-01-16 | End: 2022-06-20 | Stop reason: SDUPTHER

## 2022-02-02 ENCOUNTER — PATIENT MESSAGE (OUTPATIENT)
Dept: HEALTH INFORMATION MANAGEMENT | Facility: OTHER | Age: 82
End: 2022-02-02
Payer: MEDICARE

## 2022-02-16 ENCOUNTER — OFFICE VISIT (OUTPATIENT)
Dept: MEDICAL GROUP | Facility: MEDICAL CENTER | Age: 82
End: 2022-02-16
Payer: MEDICARE

## 2022-02-16 VITALS
DIASTOLIC BLOOD PRESSURE: 60 MMHG | BODY MASS INDEX: 32.37 KG/M2 | WEIGHT: 213.6 LBS | HEIGHT: 68 IN | SYSTOLIC BLOOD PRESSURE: 114 MMHG | TEMPERATURE: 97.3 F | HEART RATE: 74 BPM | RESPIRATION RATE: 18 BRPM | OXYGEN SATURATION: 96 %

## 2022-02-16 DIAGNOSIS — E11.49 OTHER DIABETIC NEUROLOGICAL COMPLICATION ASSOCIATED WITH TYPE 2 DIABETES MELLITUS (HCC): ICD-10-CM

## 2022-02-16 DIAGNOSIS — E11.9 TYPE 2 DIABETES MELLITUS WITHOUT COMPLICATION, WITHOUT LONG-TERM CURRENT USE OF INSULIN (HCC): ICD-10-CM

## 2022-02-16 DIAGNOSIS — I48.0 PAROXYSMAL ATRIAL FIBRILLATION (HCC): ICD-10-CM

## 2022-02-16 DIAGNOSIS — R41.3 MEMORY LOSS: ICD-10-CM

## 2022-02-16 DIAGNOSIS — K59.00 CONSTIPATION, UNSPECIFIED CONSTIPATION TYPE: ICD-10-CM

## 2022-02-16 DIAGNOSIS — F32.4 MAJOR DEPRESSIVE DISORDER IN PARTIAL REMISSION, UNSPECIFIED WHETHER RECURRENT (HCC): ICD-10-CM

## 2022-02-16 DIAGNOSIS — E88.89: ICD-10-CM

## 2022-02-16 PROBLEM — R93.89 ABNORMAL CHEST X-RAY: Status: RESOLVED | Noted: 2021-10-28 | Resolved: 2022-02-16

## 2022-02-16 LAB
HBA1C MFR BLD: 6.2 % (ref 0–5.6)
INT CON NEG: NEGATIVE
INT CON POS: POSITIVE

## 2022-02-16 PROCEDURE — 83036 HEMOGLOBIN GLYCOSYLATED A1C: CPT | Performed by: FAMILY MEDICINE

## 2022-02-16 PROCEDURE — 99214 OFFICE O/P EST MOD 30 MIN: CPT | Performed by: FAMILY MEDICINE

## 2022-02-16 ASSESSMENT — FIBROSIS 4 INDEX: FIB4 SCORE: 1.08

## 2022-02-16 ASSESSMENT — PATIENT HEALTH QUESTIONNAIRE - PHQ9: CLINICAL INTERPRETATION OF PHQ2 SCORE: 0

## 2022-02-16 NOTE — ASSESSMENT & PLAN NOTE
Controlled with cymbalta.     Depression Screening    Little interest or pleasure in doing things?  0 - not at all  Feeling down, depressed , or hopeless? 0 - not at all  Patient Health Questionnaire Score: 0    If depressive symptoms identified deferred to follow up visit unless specifically addressed in assesment and plan.      Interpretation of PHQ-9 Total Score   Score Severity   1-4 Minimal Depression   5-9 Mild Depression   10-14 Moderate Depression   15-19 Moderately Severe Depression   20-27 Severe Depression

## 2022-02-16 NOTE — PROGRESS NOTES
Subjective:     Amarjit Khan is a 81 y.o. male here today for diabetes and Annual Health Assessment.    Constipation  Patient describes some constipation.  Sometimes he will go ~1 to 2 days without having a bowel movement.  He denies significant pain.    Diabetic neuropathy (HCC)  Awaiting EMG.    Paroxysmal atrial fibrillation (HCC)  Rate controlled on anticoagulation.    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    Type 2 diabetes mellitus without complication, without long-term current use of insulin (HCC)  A1c at goal on point-of-care testing.    Memory loss  Patient reports worsening memory loss with a previous diagnosis of vascular dementia.    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     Depression Screening    Little interest or pleasure in doing things?  0 - not at all  Feeling down, depressed , or hopeless? 0 - not at all  Patient Health Questionnaire Score: 0    If depressive symptoms identified deferred to follow up visit unless specifically addressed in assesment and plan.      Interpretation of PHQ-9 Total Score   Score Severity   1-4 Minimal Depression   5-9 Mild Depression   10-14 Moderate Depression   15-19 Moderately Severe Depression   20-27 Severe Depression           Health Maintenance Summary          Overdue - Annual Wellness Visit (Once) Overdue - never done    No completion history exists for this topic.          Ordered - URINE ACR / MICROALBUMIN (Yearly) Ordered on 2022    No completion history exists for this topic.          Overdue - COLORECTAL CANCER SCREENING (COLONOSCOPY - Every 5 Years) Overdue since 2019    05/10/2019  OCCULT BLOOD FECES IMMUNOASSAY (FIT)    2007  REFERRAL TO GI FOR COLONOSCOPY          Ordered - FASTING LIPID PROFILE (Yearly) Ordered on 2018  LIPID PROFILE    2013  FASTING LIPID PANEL     2010  LIPID PROFILE    2009  LIPID PROFILE    2008  LIPID PROFILE     Only the first 5 history entries have been loaded, but more history exists.          Overdue - RETINAL SCREENING (Yearly) Overdue since 10/31/2019    10/31/2018  REFERRAL FOR RETINAL SCREENING EXAM          DIABETES MONOFILAMENT / LE EXAM (Yearly) Next due on 4/7/2022 04/07/2021  [employer]: WORKFLOW - DIABETES - DIABETIC FOOT EXAM PERFORMED    04/07/2021  Diabetic Monofilament LE Exam          A1C SCREENING (Every 6 Months) Tentatively due on 8/16/2022 02/16/2022  POCT A1C    10/11/2021  POCT A1C    07/08/2021  POCT Hemoglobin A1C    03/30/2021  HEMOGLOBIN A1C    12/07/2020  HEMOGLOBIN A1C    Only the first 5 history entries have been loaded, but more history exists.          SERUM CREATININE (Yearly) Next due on 10/18/2022    10/18/2021  Basic Metabolic Panel    12/07/2020  Comp Metabolic Panel    09/22/2020  Comp Metabolic Panel    09/01/2020  BASIC METABOLIC PANEL (8)    08/06/2020  COMP METABOLIC PANEL    Only the first 5 history entries have been loaded, but more history exists.          IMM DTaP/Tdap/Td Vaccine (2 - Td or Tdap) Next due on 11/20/2023 11/20/2013  Imm Admin: Tdap Vaccine          IMM PNEUMOCOCCAL VACCINE: 65+ Years (Series Information) Completed    11/13/2015  Imm Admin: Pneumococcal Conjugate Vaccine (Prevnar/PCV-13)    03/05/2015  Imm Admin: Pneumococcal polysaccharide vaccine (PPSV-23)    12/30/2012  Imm Admin: Pneumococcal polysaccharide vaccine (PPSV-23)          IMM INFLUENZA (Series Information) Completed    10/11/2021  Imm Admin: Influenza Vaccine Adult HD    11/12/2020  Imm Admin: Influenza Vaccine Quad Inj (Pf)    11/11/2019  Imm Admin: Influenza Vaccine Adult HD    09/27/2018  Imm Admin: Influenza, Unspecified - HISTORICAL DATA    11/01/2017  Imm Admin: Influenza Vaccine Adult HD    Only the first 5 history entries have been loaded, but more history exists.          IMM ZOSTER VACCINES (Series Information) Completed    10/11/2021  Imm Admin: Zoster Vaccine Recombinant  (RZV) (SHINGRIX)    07/08/2021  Imm Admin: Zoster Vaccine Recombinant (RZV) (SHINGRIX)    11/04/2014  Imm Admin: Zoster Vaccine Live (ZVL) (Zostavax) - HISTORICAL DATA          COVID-19 Vaccine (Series Information) Completed    01/07/2022  Imm Admin: Pfizer SARS-CoV-2 Vaccine 12+    03/03/2021  Imm Admin: Pfizer SARS-CoV-2 Vaccine    02/10/2021  Imm Admin: Pfizer SARS-CoV-2 Vaccine          IMM HEP B VACCINE (Series Information) Aged Out    No completion history exists for this topic.          IMM MENINGOCOCCAL VACCINE (MCV4) (Series Information) Aged Out    No completion history exists for this topic.                 Annual Health Assessment Questions:     1.  Are you currently engaging in any exercise or physical activity? Yes    2.  How would you describe your mood or emotional well-being today? good    3.  Have you had any falls in the last year? No    4.  Have you noticed any problems with your balance or had difficulty walking? Yes    5.  In the last six months have you experienced any leakage of urine? No    6. DPA/Advanced Directive: Patient has Advanced Directive on file.     Current medicines (including changes today)  Current Outpatient Medications   Medication Sig Dispense Refill   • lisinopril (PRINIVIL) 20 MG Tab Take 1 Tablet by mouth every day. 100 Tablet 3   • atorvastatin (LIPITOR) 80 MG tablet Take 0.5 Tablets by mouth every day. 50 Tablet 4   • acetaminophen (TYLENOL) 500 MG Tab Take 1,000 mg by mouth 1 time a day as needed. Indications: Pain     • FREESTYLE LITE strip 1 Strip by Other route every day.     • Blood Glucose Meter Kit Test blood sugar as recommended by provider. Blood glucose monitoring kit per insurance formulary. 1 Kit 0   • Blood Glucose Test Strips Use one test strip to test blood sugar once daily. 100 Strip 3   • Lancets Use one lancet to test blood sugar once daily . 100 Each 3   • Empagliflozin (JARDIANCE) 10 MG Tab Take 1 tablet by mouth every day. 100 tablet 4   •  DULoxetine (CYMBALTA) 60 MG Cap DR Particles delayed-release capsule Take 1 capsule by mouth every day. 100 capsule 4   • metoprolol SR (TOPROL XL) 25 MG TABLET SR 24 HR Take 0.5 Tablets by mouth 2 times a day. 100 tablet 4   • XARELTO 20 MG Tab tablet Take 20 mg by mouth with dinner.     • aspirin (ASA) 81 MG Chew Tab chewable tablet Chew 81 mg every evening. 100 Tab 0   • Cholecalciferol (VITAMIN D) 2000 UNIT CAPS Take 2,000 Units by mouth every day.       No current facility-administered medications for this visit.       He  has a past medical history of Acute MI ([facility]) (), Allergy, Anesthesia, Arrhythmia, Arthritis, Bowel habit changes, Cancer ([facility]), Chickenpox, Coronary artery disease, CY deficiency, CY gene mutation, Diabetes (), Emphysema of lung ([facility]), High cholesterol, Hyperlipidemia, Hypertension, Muscle disorder, Pain, Personal history of venous thrombosis and embolism (), Rectal abscess, Rheumatic fever (as child), Sleep apnea, Snoring, and Unspecified cataract.    He has no past medical history of Stroke ([facility]).    Butrans [buprenorphine], Codeine, Darvocet [propoxyphene n-apap], Fentanyl, Nucynta [tapentadol], Percocet [oxycodone-acetaminophen], Ambien [zolpidem], Biaxin [clarithromycin], Buspar [buspirone], Celexa, Citalopram, Clindamycin, Clonazepam, Demerol, Diltiazem, Doxepin, Effexor [venlafaxine], Erythromycin, Haldol [haloperidol], Hydrocodone, Lexapro, Lunesta, Lyrica, Morphine sulfate [bupropion], Remeron [mirtazapine], Serzone [nefazodone], Tape, Tizanidine hcl, Valium, Xanax [alprazolam], Zonisamide, and Zyprexa    He  reports that he has never smoked. He has never used smokeless tobacco. He reports current alcohol use. He reports that he does not use drugs.  Counseling given: Not Answered         Objective:     Physical Exam:  /60 (BP Location: Left arm, Patient Position: Sitting, BP Cuff Size: Adult long)   Pulse 74   Temp 36.3 °C (97.3 °F) (Temporal)   Resp 18    "Ht 1.727 m (5' 8\")   Wt 96.9 kg (213 lb 9.6 oz)   SpO2 96%  Body mass index is 32.48 kg/m².   Constitutional: Alert, no distress.      Assessment and Plan:     1. Type 2 diabetes mellitus without complication, without long-term current use of insulin (HCC)  - POC A1c 6.2, will send for formal lab eval. Continue current regimen.   - POCT A1C  - HEPATIC FUNCTION PANEL; Future  - Lipid Profile; Future  - MICROALBUMIN CREAT RATIO URINE; Future  - HEMOGLOBIN A1C; Future    2. Constipation, unspecified constipation type  - recommended 3 fruit/4veg and 1/2 gal of water daily.   - recommended miraLAX one cap daily until BM if no BM in 24-48 hours.   - recommended Mag Citrate 1 dose if no BM in 3 days.   - if no BM after 1 day of using mag citrate, let me know.     3. Memory loss  - Referral to Neurology    4. Other diabetic neurological complication associated with type 2 diabetes mellitus (HCC)  - awaiting EMG.     5. Paroxysmal atrial fibrillation (HCC)  - continue current regimen.     6. 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     7. Major depressive disorder in partial remission, unspecified whether recurrent (HCC)  - continue duloxetine.     Discussion today about general wellness and lifestyle habits:    · Engage in regular physical activity and social activities.  · Prevent falls and reduce trip hazards; using ambulatory aides, hearing and vision testing if appropriate.  · Steps to improve urinary incontinence.  · Advanced care planning.    Follow-Up: Return in about 4 months (around 2022), or if symptoms worsen or fail to improve.         PLEASE NOTE: This dictation was created using voice recognition software. I have made every reasonable attempt to correct obvious errors, but I expect that there are errors of grammar and possibly content that I did not discover before finalizing the note.  "

## 2022-02-16 NOTE — ASSESSMENT & PLAN NOTE
Patient describes some constipation.  Sometimes he will go ~1 to 2 days without having a bowel movement.  He denies significant pain.

## 2022-04-27 ENCOUNTER — OFFICE VISIT (OUTPATIENT)
Dept: NEUROLOGY | Facility: MEDICAL CENTER | Age: 82
End: 2022-04-27
Attending: PSYCHIATRY & NEUROLOGY
Payer: MEDICARE

## 2022-04-27 VITALS
DIASTOLIC BLOOD PRESSURE: 58 MMHG | BODY MASS INDEX: 32.94 KG/M2 | HEIGHT: 68 IN | SYSTOLIC BLOOD PRESSURE: 140 MMHG | TEMPERATURE: 98.1 F | HEART RATE: 68 BPM | OXYGEN SATURATION: 95 % | WEIGHT: 217.37 LBS | RESPIRATION RATE: 16 BRPM

## 2022-04-27 DIAGNOSIS — E13.42 DIABETIC POLYNEUROPATHY ASSOCIATED WITH OTHER SPECIFIED DIABETES MELLITUS (HCC): ICD-10-CM

## 2022-04-27 DIAGNOSIS — E11.9 TYPE 2 DIABETES MELLITUS WITHOUT COMPLICATION, WITHOUT LONG-TERM CURRENT USE OF INSULIN (HCC): ICD-10-CM

## 2022-04-27 DIAGNOSIS — G89.29 CHRONIC HIP PAIN, RIGHT: ICD-10-CM

## 2022-04-27 DIAGNOSIS — Z79.01 CHRONIC ANTICOAGULATION: ICD-10-CM

## 2022-04-27 DIAGNOSIS — G31.84 MILD COGNITIVE IMPAIRMENT WITH MEMORY LOSS: Primary | ICD-10-CM

## 2022-04-27 DIAGNOSIS — M25.551 CHRONIC HIP PAIN, RIGHT: ICD-10-CM

## 2022-04-27 DIAGNOSIS — R26.9 GAIT DISORDER: ICD-10-CM

## 2022-04-27 DIAGNOSIS — G31.89 OTHER SPECIFIED DEGENERATIVE DISEASES OF NERVOUS SYSTEM (HCC): ICD-10-CM

## 2022-04-27 DIAGNOSIS — G47.33 OBSTRUCTIVE SLEEP APNEA SYNDROME: ICD-10-CM

## 2022-04-27 PROCEDURE — 99205 OFFICE O/P NEW HI 60 MIN: CPT | Performed by: PSYCHIATRY & NEUROLOGY

## 2022-04-27 PROCEDURE — 99212 OFFICE O/P EST SF 10 MIN: CPT | Performed by: PSYCHIATRY & NEUROLOGY

## 2022-04-27 RX ORDER — HYDROMORPHONE HYDROCHLORIDE 2 MG/1
TABLET ORAL
COMMUNITY
End: 2022-08-23

## 2022-04-27 ASSESSMENT — FIBROSIS 4 INDEX: FIB4 SCORE: 1.09

## 2022-04-27 ASSESSMENT — PATIENT HEALTH QUESTIONNAIRE - PHQ9: CLINICAL INTERPRETATION OF PHQ2 SCORE: 0

## 2022-04-27 NOTE — PROGRESS NOTES
Reason for Neurology Consult:  Concern for Dementia    History of present illness:    Amarjit Khan 82 y.o. right handed gentleman who is here with his wife.  He is originally from [place] and then was a  in [employer] at the High [employer]. He retired in 2002.    He is aware of some changes in his ability to remember where he wants to go when he is backing out of the garage- this occurs about every 3 days or so over the last 6-12 months.     He has also noticed misplacing his keys (the last time was 2 weeks ago). He lost his hammer about 1 year ago.    He was diagnosed with Vascular Dementia (Dr. Sands- about 2 years ago).  The wife  has noticed a slight progression of memory disturbance with her - this occurs once a week.    He has not had any accidents or incidents driving in the last 12 months.    Wife has not noticed any problems with his communication ability over the last 12 months.    He has not had any discrete stroke events known to have occurred.    He has a chronic left hip pain issue and a failed back (lower) and walks with a simple cane. He just had a pain simulator removed in the last 3 to 6 months because it was not working.    He has had 3  falls in the last 12 months without prodromal features.  His last fall 3-4 months.  He has PT 2 times a week-> this has been helping his back tremendously    No history of significant alcohol and tobacco use in adult life.    No history of seizure(s) or stroke(s) known to have occurred or documented in the EMR.      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 Hernández   • Allergy    • Anesthesia     Vomiting   • Arrhythmia     A-fib   • Arthritis     Spine, hips, knees   • Bowel habit changes     Constipation   • Cancer (HCC)     Skin    • Chickenpox    • Coronary artery disease    • CY deficiency    • CY gene mutation    • Diabetes (HCC)     Diet and oral medication   • Emphysema of lung ([facility])    • High cholesterol    • Hyperlipidemia    • Hypertension    • Muscle disorder    • Pain     Back and hips   • Personal history of venous thrombosis and embolism 2004    left arm   • Rectal abscess    • Rheumatic fever as child   • Sleep apnea     CPAP   • Snoring    • Unspecified cataract     surgical correction bilateral       Past surgical history:   Past Surgical History:   Procedure Laterality Date   • PB IMPLANT NEUROSTIM/ N/A 2021    Procedure: REMOVAL NEUROSTIMULATOR, PERMANENT, SPINAL CORD;  Surgeon: Margarito Goode M.D.;  Location: SURGERY [facility];  Service: Pain Management   • NM TEMPORAL ARTERY LIGATN OR BX Right 2020    Procedure: BIOPSY, ARTERY, TEMPORAL;  Surgeon: Perry Vale M.D.;  Location: [facility];  Service: Vascular   • SPINAL CORD STIMULATOR  2015    Procedure: SPINAL CORD STIMULATOR PERC PERM;  Surgeon: Margarito Goode M.D.;  Location: [facility];  Service:    • NM PERCUT IMPLNT NEUROELECT,EPIDURAL  7/15/2015    Procedure: IMPLANT NEUROSTIM EPI ARRAY - SPINAL CORD STIM TRIAL [place]  SCIENTIFIC;  Surgeon: Margarito Goode M.D.;  Location: SURGERY SURG[facility];  Service: Pain Management   • AK PERCUT IMPLNT NEUROELECT,EPIDURAL  7/15/2015    Procedure: IMPLANT NEUROSTIM EPI ARRAY;  Surgeon: Margarito Goode M.D.;  Location: SURGERY SURGICAL [facility];  Service: Pain Management   • HIP ARTHROPLASTY TOTAL Left 2/2015   • CATARACT EXTRACTION WITH IOL Bilateral 2010   • ANAL FISTULOTOMY  8/27/08    Performed by ELLY RAMOS at SURGERY SAME DAY [facility]   • SHOULDER ARTHROTOMY Right 2006   • NERVE ULNAR TRANSFER Right 2004   • ELBOW ARTHROTOMY Left 1998   • KNEE ARTHROSCOPY Left 1995   • KNEE ARTHROTOMY Right 1992   • KNEE ARTHROTOMY Right 1990   • LUMBAR LAMINECTOMY DISKECTOMY  1988   • EYE SURGERY     • STENT PLACEMENT  2005,2010         Social history:   Social History     Socioeconomic History   • Marital status:      Spouse name: Not on file   • Number of children: Not on file   • Years of education: Not on file   • Highest education level: Not on file   Occupational History   • Not on file   Tobacco Use   • Smoking status: Never Smoker   • Smokeless tobacco: Never Used   Vaping Use   • Vaping Use: Never used   Substance and Sexual Activity   • Alcohol use: Yes     Comment: Occasional   • Drug use: Yes     Types: Marijuana, Oral     Comment: Occasionally for back pain   • Sexual activity: Not on file   Other Topics Concern   • Not on file   Social History Narrative   • Not on file     Social Determinants of Health     Financial Resource Strain: Not on file   Food Insecurity: Not on file   Transportation Needs: Not on file   Physical Activity: Not on file   Stress: Not on file   Social Connections: Not on file   Intimate Partner Violence: Not on file   Housing Stability: Not on file       Family history:   Family History   Problem Relation Age of Onset   • Diabetes Mother    • Heart Disease Mother    • Hyperlipidemia Mother    • Sleep Apnea Son          Current medications:    Current Outpatient Medications   Medication   • HYDROmorphone (DILAUDID) 2 MG Tab   • lisinopril (PRINIVIL) 20 MG Tab   • atorvastatin (LIPITOR) 80 MG tablet   • acetaminophen (TYLENOL) 500 MG Tab   • Empagliflozin (JARDIANCE) 10 MG Tab   • DULoxetine (CYMBALTA) 60 MG Cap DR Particles delayed-release capsule   • metoprolol SR (TOPROL XL) 25 MG TABLET SR 24 HR   • XARELTO 20 MG Tab tablet   • aspirin (ASA) 81 MG Chew Tab chewable tablet   • Cholecalciferol (VITAMIN D) 2000 UNIT CAPS   • FREESTYLE LITE strip   • Blood Glucose Meter Kit   • Blood Glucose Test Strips   • Lancets     No current facility-administered medications for this visit.       Medication Allergy:  Allergies   Allergen Reactions   • Butrans [Buprenorphine] Anaphylaxis     CYP 3A4 and 3A5 Mutation   • Codeine Anaphylaxis     CYP 3A4 and 3A5 Mutation   • Darvocet [Propoxyphene N-Apap] Anaphylaxis   • Fentanyl Anaphylaxis     CYP 3A4 and 3A5 Mutation  Per pt. Can have fentanyl but in small amounts. Per pt. Has tolerated fentanyl in past.      • Nucynta [Tapentadol] Anaphylaxis and Swelling   • Percocet [Oxycodone-Acetaminophen] Anaphylaxis   • Ambien [Zolpidem]      CYP 3A4 and 3A5 Mutation   • Biaxin [Clarithromycin]      CYP 3A4 and 3A5 Mutation   • Buspar [Buspirone]      CYP 3A4 and 3A5 Mutation   • Celexa      CYP 3A4 and 3A5 Mutation   • Citalopram    • Clindamycin    • Clonazepam      CYP 3A4 and 3A5 Mutation   • Demerol    • Diltiazem      CYP 3A4 and 3A5 Mutation   • Doxepin      CYP 3A4 and 3A5 Mutation   • Effexor [Venlafaxine]      CYP 3A4 and 3A5 Mutation   • Erythromycin      CYP 3A4 and 3A5 Mutation   • Haldol [Haloperidol]      CYP 3A4 and 3A5 Mutation   • Hydrocodone    • Lexapro      CYP 3A4 and 3A5 Mutation   • Lunesta      CYP 3A4 and 3A5 Mutation   • Lyrica Swelling   • Morphine Sulfate [Bupropion] Hives   • Remeron [Mirtazapine]      CYP 3A4 and 3A5 Mutation   • Serzone [Nefazodone]      CYP 3A4 and 3A5 Mutation   • Tape    •  "Tizanidine Hcl      Low blood pressure   • Valium      CYP 3A4 and 3A5 Mutation   • Xanax [Alprazolam]      CYP 3A4 and 3A5 Mutation   • Zonisamide    • Zyprexa      CYP 3A4 and 3A5 Mutation           Physical examination:   Vitals:    04/27/22 1527   BP: 140/58   BP Location: Right arm   Patient Position: Sitting   BP Cuff Size: Adult   Pulse: 68   Resp: 16   Temp: 36.7 °C (98.1 °F)   TempSrc: Temporal   SpO2: 95%   Weight: 98.6 kg (217 lb 6 oz)   Height: 1.727 m (5' 8\")       Normal cephalic atraumatic.  There is full range of movement around the neck in all directions without restrictions or discrete pain evoked triggers.  No lower extremity edema.  No vasomotor or sudomotor changes of the feet or toes.  No ulcers of the feet.      Neurological  Exam:      [place] Cognitive Assessment (MOCA) Version 7.1    Years of Education: Master's Degree    TOTAL SCORE: 22/30  (to be scanned into the MEDIA section in the E.M.R.)          Mental status: Awake, alert and fully oriented to person, place, time and situation. Normal attention, concentration and fund of knowledge for education level.  Did not appear/act combative,irritable,anxious,paranoid/delusional or aggressive to or with me.    Speech and language: Speech is fluent without errors, clear, intact to repetition and intact to naming.     Follows 3 step motor commands in sequence without significant delay and correctly.    Cranial nerve exam:  II: Pupils are equally round and reactive to light. Visual fields are intact by confrontation.  III, IV, VI: EOMI, no diplopia, no ptosis.  V: Sensation to light touch is normal over V1-3 distributions bilaterally.  .  VII: Facial movements are symmetrical. There is no facial droop. .  VIII: Hearing intact to soft speech and finger rub bilaterally  IX: Palate elevates symmetrically, uvula is midline. Dysarthria is not present.  XI: Shoulder shrug are symmetrical and strong.   XII: Tongue protrudes midline.      Motor " exam:  Muscle tone is normal in all 4 limbs. and No abnormal movements appreciated.    Muscle strength:    Neck Flexors/Extensors: 5/5       Right  Left  Deltoid   5/5  5/5      Biceps   5/5  5/5  Triceps  5/5  5/5   Wrist extensors 5/5  5/5  Wrist flexors  5/5  5/5     5/5  5/5  Interossei  5/5  5/5  Thenar (APB)  5/5  5/5   Hip flexors  5/5  5/5  Quadriceps  5/5  5/5    Hamstrings  5/5  5/5  Dorsiflexors  5/5  5/5  Plantarflexors  5/5  5/5  Toe extension  5/5  5/5      Sensory exam:    Vibratory: 0-2 seconds; 6-8 seconds at the ankles, 10 seconds at the knees, 20 seconds at the index fingers.    No stocking lost of the lower extremities.      Reflexes:       Right  Left  Biceps   2/4  2/4  Triceps  2/4  2/4  Brachioradialis 2/4  2/4  Knee jerk  2/4  2/4  Ankle jerk  0/4  0/4     Frontal release signs are absent    bilaterally toes are downgoing to plantar stimulation..    Coordination (finger-to-nose, heel/knee/shin, rapid alternating movements) was normal.     There was no ataxia, no tremors, and no dysmetria.     Station and gait>     Easily stands up from exam chair without retropulsion,veering,leaning,swaying (to either side).     No rombergism.      Labs and Tests:      Component Ref Range & Units 2 mo ago   (22) 6 mo ago   (10/11/21) 9 mo ago   (21) 1 yr ago   (3/30/21) 1 yr ago   (20) 2 yr ago   (19) 2 yr ago   (19)   Glycohemoglobin 0.0 - 5.6 % 6.2 Abnormal   6.2 Abnormal   6.4 Abnormal   9.1 High  R, CM  7.1 High  R, CM      Internal Control Negative  Negative  Negative  Negative    Negative               NEUROIMAGIN/2021:    HISTORY/REASON FOR EXAM:  Head injury one week ago. Headache.     TECHNIQUE/EXAM DESCRIPTION AND NUMBER OF VIEWS:  CT of the head without contrast.     Up to date radiation dose reduction adjustments have been utilized to meet ALARA standards for radiation dose reduction.     COMPARISON: None available     FINDINGS: There is no evidence of mass  or mass effect. CSF spaces are prominent. There is prominent periventricular chronic small vessel ischemic change present. There is no intracranial hemorrhage seen. The calvarium is intact. There is no scalp injury.   There is no evidence of sinus disease.        IMPRESSION:     1.  No evidence of acute intracranial process.     2.  Cerebral atrophy as well as periventricular chronic small vessel ischemic change.             Exam Ended: 08/30/21  6:22 PM Last Resulted: 08/30/21  6:25 PM                 Impression/Plans/Recommendations:    1. Mild Cognitive Impairment with memory disturbance for 2 years or so. No rapid decline in the last 6-12 months- agreed up by wife.    He is in a borderline to early stage dementia situation which we reviewed today as he has noticed difficulty staying on task (which is his perception of a memory disturbance).    No behavioral issues in the last 12 months.    Not parkinsonism.    MOCA of 22/30 today and FAQ score of 9 today.    2. Paroxysmal Atrial Fibrillation-  On Xarelto for stroke prevention. Amarjit and wife agree to him continue this blood thinner long term to reduce stroke risk.     3. Chronic- Mild Gait Disorder- notable antalgic limp which has been present for over 1  Year; Amarjit uses a simple cane and has remained without any falls for over 3 months or so.    4. O.S.A.- on CPAP.    5. Diabetic Polyneuropathy- for last 1-2  Years > feet (primarily all his toes)- no neuropathic pain(s) ongoing or evolving.      Regarding #1:    Today, I reviewed the clinical criteria and reviewed several  scenarios of the differences being using the medical terms of normal brain aging (age associated memory impairment),  Mild Cognitive Impairment (MCI) and Dementia.    MCI is a syndrome but statistically and for the majority of patients  occurs due  to a more rapid aging of the brain tissue or potentially from injury to certain parts of one's brain ( often from such contributing factors as  the  "cumulative effects of alcohol, from one or more ischemic or hemmorhagic stroke(s), from neurodegeneration or long standing with/without suboptimally controlled Hypertension). It is for some of these potential factors as to why I recommend a brain imaging test (Head CT or Brain MRI) be done for the 1st time or in certain circumstances repeated for comparison purposes  as such imaging can suggest one or more factors as to the reason(s) for the person's cognitive/memory changes. In fact, a normal or \"age related\" finding on a brain imaging test in and of itself is useful clinical and objective information to have in the evaluation of a person who has either an acute, evolving or even chronic (months to years) long cognitive/memory complaint.    Additional factors or contributors to Brain Health issues can be summarized in several books/references which I discussed as well today.     Goals going forwards include:    A. Paying attention to one's risk factors and reducing their prevalence or potential impact on one's changing memory/thinking> an excellent example would be to stop smoking, reduce or eliminate alcohol use (depending on the amount and frequency of usage), maintain good blood pressure control by buying a digital arm blood pressure cuff such as an OMRON Series 3 or 5 and checking one's blood pressure atleast 3 days per week (in the am and early afternoon) that the numbers are under 140/90 and working as needed with the primary care doctor  to optimize blood pressure control).      B. Encouraging proper sleep hygiene which for most adults is 7 to 8 hours of uninterrupted sleep per night.      C. Encouraging some form of exercise preferable aerobic forms to be done (4 to 5 days per week- 30 minutes minimum per day)> 150 minutes per week as a goal. Example activities could include fast walking (up a slight incline), jogging, cycling (road or stationary), swimming,tennis. Essentially, even basic walking on a flat " surface or a treadmill would be better than doing nothing.    D. Maintaining or forming new social contacts with family and friends  and a positive attitude despite the concerns and/or ongoing issues with thinking and/or memory.    E. Eating well which means a diet low in salt  (under 2 grams per day), sugar and saturated fat.    F. Maintaining one's BMI (Body Mass Index) under 30.    G. Brain MRI to be ordered to evaluate the cortex and white matter tracks.    H. Vitamin B levels to be checked.    I. Dr. Saunders PhD referral to further evaluate cognitive situation.    J. I encouraged use of CPAP machine.      Total time spent today or this patient's care was 70  minutes  and included reviewing  the diagnostic workup to date (such as labs and imaging as well as interpreting such tests relevant to this patient's neurological condition),  reviewing/obtaining separately obtained history (from patient and/or accompanying ffamily member)  for today's neurological problem(s) ,counseling and educating the patient and family member on issues related to cognition/memory and cognitive health factors and documenting  the clinical information in the EMR.    Follow up PRN at this time.        Alfonso Guaman MD  [facility].   [facility]

## 2022-04-28 ENCOUNTER — HOSPITAL ENCOUNTER (OUTPATIENT)
Dept: LAB | Facility: MEDICAL CENTER | Age: 82
End: 2022-04-28
Attending: FAMILY MEDICINE
Payer: MEDICARE

## 2022-04-28 ENCOUNTER — HOSPITAL ENCOUNTER (OUTPATIENT)
Dept: LAB | Facility: MEDICAL CENTER | Age: 82
End: 2022-04-28
Attending: PSYCHIATRY & NEUROLOGY
Payer: MEDICARE

## 2022-04-28 DIAGNOSIS — E11.9 TYPE 2 DIABETES MELLITUS WITHOUT COMPLICATION, WITHOUT LONG-TERM CURRENT USE OF INSULIN (HCC): ICD-10-CM

## 2022-04-28 DIAGNOSIS — G31.84 MILD COGNITIVE IMPAIRMENT WITH MEMORY LOSS: ICD-10-CM

## 2022-04-28 LAB
ALBUMIN SERPL BCP-MCNC: 4.2 G/DL (ref 3.2–4.9)
ALP SERPL-CCNC: 68 U/L (ref 30–99)
ALT SERPL-CCNC: 28 U/L (ref 2–50)
AST SERPL-CCNC: 20 U/L (ref 12–45)
BILIRUB CONJ SERPL-MCNC: <0.2 MG/DL (ref 0.1–0.5)
BILIRUB INDIRECT SERPL-MCNC: NORMAL MG/DL (ref 0–1)
BILIRUB SERPL-MCNC: 1 MG/DL (ref 0.1–1.5)
CHOLEST SERPL-MCNC: 107 MG/DL (ref 100–199)
CREAT UR-MCNC: 74.35 MG/DL
EST. AVERAGE GLUCOSE BLD GHB EST-MCNC: 134 MG/DL
FOLATE SERPL-MCNC: 6 NG/ML
HBA1C MFR BLD: 6.3 % (ref 4–5.6)
HDLC SERPL-MCNC: 26 MG/DL
LDLC SERPL CALC-MCNC: 55 MG/DL
MICROALBUMIN UR-MCNC: <1.2 MG/DL
MICROALBUMIN/CREAT UR: NORMAL MG/G (ref 0–30)
PROT SERPL-MCNC: 7.2 G/DL (ref 6–8.2)
TRIGL SERPL-MCNC: 130 MG/DL (ref 0–149)
TSH SERPL DL<=0.005 MIU/L-ACNC: 2.16 UIU/ML (ref 0.38–5.33)
VIT B12 SERPL-MCNC: 1476 PG/ML (ref 211–911)

## 2022-04-28 PROCEDURE — 82570 ASSAY OF URINE CREATININE: CPT

## 2022-04-28 PROCEDURE — 36415 COLL VENOUS BLD VENIPUNCTURE: CPT

## 2022-04-28 PROCEDURE — 83036 HEMOGLOBIN GLYCOSYLATED A1C: CPT

## 2022-04-28 PROCEDURE — 84425 ASSAY OF VITAMIN B-1: CPT

## 2022-04-28 PROCEDURE — 82043 UR ALBUMIN QUANTITATIVE: CPT

## 2022-04-28 PROCEDURE — 84443 ASSAY THYROID STIM HORMONE: CPT

## 2022-04-28 PROCEDURE — 80061 LIPID PANEL: CPT

## 2022-04-28 PROCEDURE — 82607 VITAMIN B-12: CPT

## 2022-04-28 PROCEDURE — 82746 ASSAY OF FOLIC ACID SERUM: CPT

## 2022-04-28 PROCEDURE — 80076 HEPATIC FUNCTION PANEL: CPT

## 2022-05-04 LAB — VIT B1 BLD-MCNC: 115 NMOL/L (ref 70–180)

## 2022-05-10 ENCOUNTER — APPOINTMENT (OUTPATIENT)
Dept: RADIOLOGY | Facility: MEDICAL CENTER | Age: 82
End: 2022-05-10
Attending: PSYCHIATRY & NEUROLOGY
Payer: MEDICARE

## 2022-06-01 ENCOUNTER — TELEPHONE (OUTPATIENT)
Dept: HEALTH INFORMATION MANAGEMENT | Facility: OTHER | Age: 82
End: 2022-06-01

## 2022-06-14 ENCOUNTER — TELEPHONE (OUTPATIENT)
Dept: MEDICAL GROUP | Facility: MEDICAL CENTER | Age: 82
End: 2022-06-14
Payer: MEDICARE

## 2022-06-14 NOTE — TELEPHONE ENCOUNTER
Pt left a mess asking for;  1) PT referral to a new location ([facility] . Fax [phone])  Dr. Canchola please sign the referral or advise...

## 2022-06-20 ENCOUNTER — OFFICE VISIT (OUTPATIENT)
Dept: MEDICAL GROUP | Facility: MEDICAL CENTER | Age: 82
End: 2022-06-20
Payer: MEDICARE

## 2022-06-20 VITALS
BODY MASS INDEX: 29.85 KG/M2 | HEART RATE: 69 BPM | DIASTOLIC BLOOD PRESSURE: 60 MMHG | WEIGHT: 196.98 LBS | SYSTOLIC BLOOD PRESSURE: 108 MMHG | TEMPERATURE: 97.9 F | HEIGHT: 68 IN | RESPIRATION RATE: 20 BRPM | OXYGEN SATURATION: 94 %

## 2022-06-20 DIAGNOSIS — Z12.12 SCREENING FOR COLORECTAL CANCER: ICD-10-CM

## 2022-06-20 DIAGNOSIS — R53.81 DEBILITY: ICD-10-CM

## 2022-06-20 DIAGNOSIS — Z12.11 COLON CANCER SCREENING: ICD-10-CM

## 2022-06-20 DIAGNOSIS — Z12.11 SCREENING FOR COLORECTAL CANCER: ICD-10-CM

## 2022-06-20 DIAGNOSIS — Z00.00 HEALTHCARE MAINTENANCE: ICD-10-CM

## 2022-06-20 DIAGNOSIS — R35.1 NOCTURIA: ICD-10-CM

## 2022-06-20 DIAGNOSIS — N40.2 PROSTATE NODULE: ICD-10-CM

## 2022-06-20 DIAGNOSIS — I10 ESSENTIAL HYPERTENSION: ICD-10-CM

## 2022-06-20 DIAGNOSIS — G89.29 OTHER CHRONIC PAIN: ICD-10-CM

## 2022-06-20 DIAGNOSIS — M54.6 ACUTE THORACIC BACK PAIN, UNSPECIFIED BACK PAIN LATERALITY: ICD-10-CM

## 2022-06-20 DIAGNOSIS — E11.9 TYPE 2 DIABETES MELLITUS WITHOUT COMPLICATION, WITHOUT LONG-TERM CURRENT USE OF INSULIN (HCC): ICD-10-CM

## 2022-06-20 PROBLEM — S09.90XA HEAD INJURY: Status: RESOLVED | Noted: 2021-08-30 | Resolved: 2022-06-20

## 2022-06-20 PROBLEM — R42 DIZZINESS: Status: RESOLVED | Noted: 2020-08-31 | Resolved: 2022-06-20

## 2022-06-20 PROCEDURE — 99214 OFFICE O/P EST MOD 30 MIN: CPT | Performed by: FAMILY MEDICINE

## 2022-06-20 RX ORDER — DULOXETIN HYDROCHLORIDE 60 MG/1
60 CAPSULE, DELAYED RELEASE ORAL DAILY
Qty: 90 CAPSULE | Refills: 1 | Status: SHIPPED | OUTPATIENT
Start: 2022-06-20 | End: 2022-12-02

## 2022-06-20 RX ORDER — EMPAGLIFLOZIN 10 MG/1
1 TABLET, FILM COATED ORAL
Qty: 100 TABLET | Refills: 4 | Status: SHIPPED | OUTPATIENT
Start: 2022-06-20 | End: 2022-09-23

## 2022-06-20 RX ORDER — LISINOPRIL 20 MG/1
10 TABLET ORAL DAILY
Qty: 30 TABLET | Refills: 0
Start: 2022-06-20

## 2022-06-20 RX ORDER — ATORVASTATIN CALCIUM 80 MG/1
40 TABLET, FILM COATED ORAL DAILY
Qty: 50 TABLET | Refills: 1 | Status: SHIPPED | OUTPATIENT
Start: 2022-06-20 | End: 2022-12-02

## 2022-06-20 RX ORDER — TAMSULOSIN HYDROCHLORIDE 0.4 MG/1
0.4 CAPSULE ORAL
Qty: 90 CAPSULE | Refills: 1 | Status: SHIPPED | OUTPATIENT
Start: 2022-06-20 | End: 2022-08-23

## 2022-06-20 RX ORDER — LISINOPRIL 20 MG/1
20 TABLET ORAL DAILY
Qty: 100 TABLET | Refills: 1 | Status: SHIPPED | OUTPATIENT
Start: 2022-06-20 | End: 2022-06-20

## 2022-06-20 RX ORDER — METOPROLOL SUCCINATE 25 MG/1
12.5 TABLET, EXTENDED RELEASE ORAL 2 TIMES DAILY
Qty: 100 TABLET | Refills: 1 | Status: SHIPPED | OUTPATIENT
Start: 2022-06-20 | End: 2023-01-31

## 2022-06-20 RX ORDER — RIVAROXABAN 20 MG/1
20 TABLET, FILM COATED ORAL
Qty: 90 TABLET | Refills: 1 | Status: SHIPPED | OUTPATIENT
Start: 2022-06-20

## 2022-06-20 ASSESSMENT — FIBROSIS 4 INDEX: FIB4 SCORE: 1.32

## 2022-06-20 NOTE — ASSESSMENT & PLAN NOTE
Patient requesting home health for physical therapy.  He is debilitated and requires the use of a cane to ambulate.

## 2022-06-20 NOTE — ASSESSMENT & PLAN NOTE
The patient describes 4 months of nocturia along with a sensation of incomplete voiding.  He denies dysuria.  He has never had a prostate surgery.

## 2022-06-20 NOTE — PATIENT INSTRUCTIONS
Please only take Lisinopril 20 mg 1/2 pill daily. Start flomax and get non-fasting labs within the next week. Please see urology. Referral placed.

## 2022-06-20 NOTE — PROGRESS NOTES
"[facility]  Progress Note  Established Patient    Subjective:   Amarjit Khan is a 82 y.o. male here today with a chief complaint of nocturia.     Healthcare maintenance  He tells me he has never had an abnormal colonoscopy but does have a family history of a variety of cancers.  He would not be willing to get a colonoscopy for screening purposes again but is amenable to FIT testing.      Type 2 diabetes mellitus without complication, without long-term current use of insulin (HCC)  A1c at goal.     Prostate nodule  Noted on exam today.     Nocturia  The patient describes 4 months of nocturia along with a sensation of incomplete voiding.  He denies dysuria.  He has never had a prostate surgery.    Debility  Patient requesting home health for physical therapy.  He is debilitated and requires the use of a cane to ambulate.    Chronic pain  Would like to get home health physical therapy for this issue.    Back pain  Under the care of subspecialty.  Requesting physical therapy.      Current Outpatient Medications on File Prior to Visit   Medication Sig Dispense Refill   • HYDROmorphone (DILAUDID) 2 MG Tab hydromorphone 2 mg tablet     • acetaminophen (TYLENOL) 500 MG Tab Take 1,000 mg by mouth 1 time a day as needed. Indications: Pain     • aspirin (ASA) 81 MG Chew Tab chewable tablet Chew 81 mg every evening. 100 Tab 0   • Cholecalciferol (VITAMIN D) 2000 UNIT CAPS Take 2,000 Units by mouth every day.       No current facility-administered medications on file prior to visit.          Objective:     Vitals:    06/20/22 1313   BP: 108/60   BP Location: Left arm   Patient Position: Sitting   BP Cuff Size: Adult long   Pulse: 69   Resp: 20   Temp: 36.6 °C (97.9 °F)   TempSrc: Temporal   SpO2: 94%   Weight: 89.4 kg (196 lb 15.7 oz)   Height: 1.727 m (5' 8\")       Physical Exam:  General: alert in no apparent distress.   : Enlarged prostate.  Possible prostate nodule at the 4 o'clock " position.        Assessment and Plan:     1. Screening for colorectal cancer  Not interested in colonoscopy but would be open to FIT testing.    2. Type 2 diabetes mellitus without complication, without long-term current use of insulin (HCC)  Patient is trying to be more conscientious about his lifestyle changes and this, together with the Jardiance, has helped him to lose weight.  - Empagliflozin (JARDIANCE) 10 MG Tab; Take 1 Tablet by mouth every day.  Dispense: 100 Tablet; Refill: 4    3. Healthcare maintenance  - see above.     4. Acute thoracic back pain, unspecified back pain laterality  - Referral to Home Health    5. Debility  - Referral to Home Health    6. Other chronic pain  - DULoxetine (CYMBALTA) 60 MG Cap DR Particles delayed-release capsule; Take 1 Capsule by mouth every day.  Dispense: 90 Capsule; Refill: 1    7. Essential hypertension  - reduce ACE, add on flomax for nocturia. Labs ordered.   - metoprolol SR (TOPROL XL) 25 MG TABLET SR 24 HR; Take 0.5 Tablets by mouth 2 times a day.  Dispense: 100 Tablet; Refill: 1  - lisinopril (PRINIVIL) 20 MG Tab; Take 0.5 Tablets by mouth every day.  Dispense: 30 Tablet; Refill: 0  - Basic Metabolic Panel; Future    8. Colon cancer screening  - OCCULT BLOOD FECES IMMUNOASSAY; Future    9. Nocturia  Patient has nocturia with urinary hesitancy.  There is a possible prostate nodule on exam.  I will start him on Flomax and asked him to get the urinalysis and PSA this week.  I asked him to follow-up with urology for further evaluation.  There is concern here for prostate cancer.  - Referral to Urology  - tamsulosin (FLOMAX) 0.4 MG capsule; Take 1 Capsule by mouth 1/2 hour after breakfast.  Dispense: 90 Capsule; Refill: 1  - URINALYSIS,CULTURE IF INDICATED; Future  - PROSTATE SPECIFIC AG DIAGNOSTIC; Future    10. Prostate nodule  See above.   - Referral to Urology  - tamsulosin (FLOMAX) 0.4 MG capsule; Take 1 Capsule by mouth 1/2 hour after breakfast.  Dispense: 90  Capsule; Refill: 1  - URINALYSIS,CULTURE IF INDICATED; Future  - PROSTATE SPECIFIC AG DIAGNOSTIC; Future    I informed the patient that I will not be returning to work with [email or web address] as of August 01, 2022. I informed the patient that he should establish with a new doctor before then. It has been a privilege serving as this patient's family doctor and I wish him the best.  I informed the patient that if he needs to see me before I leave I'm available for appointments.      Followup: Return if symptoms worsen or fail to improve.

## 2022-06-20 NOTE — ASSESSMENT & PLAN NOTE
He tells me he has never had an abnormal colonoscopy but does have a family history of a variety of cancers.  He would not be willing to get a colonoscopy for screening purposes again but is amenable to FIT testing.

## 2022-06-26 ENCOUNTER — HOSPITAL ENCOUNTER (OUTPATIENT)
Facility: MEDICAL CENTER | Age: 82
End: 2022-06-26
Attending: FAMILY MEDICINE
Payer: MEDICARE

## 2022-06-26 PROCEDURE — 82274 ASSAY TEST FOR BLOOD FECAL: CPT

## 2022-06-29 ENCOUNTER — HOSPITAL ENCOUNTER (OUTPATIENT)
Dept: RADIOLOGY | Facility: MEDICAL CENTER | Age: 82
End: 2022-06-29
Attending: PSYCHIATRY & NEUROLOGY
Payer: MEDICARE

## 2022-06-29 DIAGNOSIS — G31.89 OTHER SPECIFIED DEGENERATIVE DISEASES OF NERVOUS SYSTEM (HCC): ICD-10-CM

## 2022-06-29 DIAGNOSIS — G31.84 MILD COGNITIVE IMPAIRMENT WITH MEMORY LOSS: ICD-10-CM

## 2022-06-29 PROCEDURE — 700117 HCHG RX CONTRAST REV CODE 255: Performed by: PSYCHIATRY & NEUROLOGY

## 2022-06-29 PROCEDURE — 70551 MRI BRAIN STEM W/O DYE: CPT | Mod: MG

## 2022-06-29 PROCEDURE — A9576 INJ PROHANCE MULTIPACK: HCPCS | Performed by: PSYCHIATRY & NEUROLOGY

## 2022-07-04 DIAGNOSIS — Z12.11 SCREENING FOR COLORECTAL CANCER: ICD-10-CM

## 2022-07-04 DIAGNOSIS — Z12.12 SCREENING FOR COLORECTAL CANCER: ICD-10-CM

## 2022-07-05 LAB — IMM ASSAY OCC BLD FITOB: NEGATIVE

## 2022-07-14 ENCOUNTER — TELEPHONE (OUTPATIENT)
Dept: MEDICAL GROUP | Facility: MEDICAL CENTER | Age: 82
End: 2022-07-14
Payer: MEDICARE

## 2022-07-14 NOTE — TELEPHONE ENCOUNTER
Phone Number Called: [phone] (home)     Call outcome: Spoke to patient regarding message below.    Message: Pt's daughter called stating her father is covid positive and is not doing too well. Informed them to go to urgent care, but due to health problems and all allergies of the pt, they would like to know what Dr. Canchola would recommend. Pt's daughter states best of contact with be brother Chon at [phone]

## 2022-07-15 ENCOUNTER — OFFICE VISIT (OUTPATIENT)
Dept: URGENT CARE | Facility: PHYSICIAN GROUP | Age: 82
End: 2022-07-15
Payer: MEDICARE

## 2022-07-15 VITALS
OXYGEN SATURATION: 95 % | HEIGHT: 67 IN | HEART RATE: 67 BPM | RESPIRATION RATE: 16 BRPM | WEIGHT: 210 LBS | SYSTOLIC BLOOD PRESSURE: 132 MMHG | DIASTOLIC BLOOD PRESSURE: 76 MMHG | BODY MASS INDEX: 32.96 KG/M2 | TEMPERATURE: 97.5 F

## 2022-07-15 DIAGNOSIS — B34.9 ACUTE VIRAL SYNDROME: ICD-10-CM

## 2022-07-15 DIAGNOSIS — U07.1 COVID: Primary | ICD-10-CM

## 2022-07-15 LAB
EXTERNAL QUALITY CONTROL: ABNORMAL
SARS-COV+SARS-COV-2 AG RESP QL IA.RAPID: POSITIVE

## 2022-07-15 PROCEDURE — 87426 SARSCOV CORONAVIRUS AG IA: CPT | Performed by: EMERGENCY MEDICINE

## 2022-07-15 PROCEDURE — 99213 OFFICE O/P EST LOW 20 MIN: CPT | Mod: CS | Performed by: EMERGENCY MEDICINE

## 2022-07-15 RX ORDER — BENZONATATE 100 MG/1
100 CAPSULE ORAL 3 TIMES DAILY PRN
Qty: 30 CAPSULE | Refills: 0 | Status: SHIPPED
Start: 2022-07-15 | End: 2022-09-23

## 2022-07-15 RX ORDER — BENZONATATE 100 MG/1
100 CAPSULE ORAL 3 TIMES DAILY PRN
Qty: 30 CAPSULE | Refills: 0 | Status: SHIPPED | OUTPATIENT
Start: 2022-07-15 | End: 2022-07-15

## 2022-07-15 ASSESSMENT — ENCOUNTER SYMPTOMS
VOMITING: 0
SHORTNESS OF BREATH: 0
FEVER: 0
NAUSEA: 0
COUGH: 1
SORE THROAT: 1
SPUTUM PRODUCTION: 0

## 2022-07-15 ASSESSMENT — FIBROSIS 4 INDEX: FIB4 SCORE: 1.32

## 2022-07-15 NOTE — TELEPHONE ENCOUNTER
Phone Number Called: [phone]    Call outcome: Spoke with patient's son, Chon, regarding message below.    Message: Called to discuss patient's symptoms of Covid.  Per patient's son, Chon, patient began having symptoms 7/13/22.  Tested positive for COVID on at-home test yesterday.  Symptoms include cough, headache, body aches, and chest congestion.  Chon would like prescription for oral antiviral medication.  Agreeable to being seen for virtual visit, but no available appointments with any of the providers in the office today.  Recommended going to Urgent Care to be evaluated and discuss antiviral treatment.  Chon did not want patient to have to go to Urgent Care.  Chon asked if Dr. Canchola would be able to send prescription to [facility] or [facility]'s pharmacy in [place] without an appointment.  Please advise.    Informed Chon that an high priority message would be sent to Dr. Canchola for consideration but he is out of the office this afternoon.  If no reply within a few hours, encouraged Chon to have patient seen in Urgent Care.

## 2022-07-15 NOTE — TELEPHONE ENCOUNTER
Pt's daughter called, I informed that pt needs to be seen but unfortunately none of our providers here have any openings today. Pt needs to go to [facility]. Pt's daughter verbalized understanding and she'll bring pt today.  Dr. Canchola, please note...

## 2022-07-16 NOTE — PROGRESS NOTES
Subjective:     Amarjit Khan  is a 82 y.o. male who presents for Coronavirus Screening (Sx: headaches, coughing,lethargic, body aches, chills, sore throat. x3days.)       Patient is an 82-year-old gentleman that presents with his son for evaluation of COVID.  He has been having body aches, sore throat for 3 days, cough which has been keeping him up at night.  He denies shortness of breath.  He states he feels like he has no energy.  He had a recent trip to [place] where he was around family members for the  of his brother-in-law.  Patient has multiple medical problems including coronary artery disease, anticoagulation.  Patient has been monitoring his oxygen saturations at home for the most part they have been over 92%, they have occasionally dipped down to 89% at rest, but that was a single episode.   Review of Systems   Constitutional: Positive for malaise/fatigue. Negative for fever.   HENT: Positive for sore throat.    Respiratory: Positive for cough. Negative for sputum production and shortness of breath.    Cardiovascular: Negative for chest pain.   Gastrointestinal: Negative for nausea and vomiting.   All other systems reviewed and are negative.     Allergies   Allergen Reactions   • Butrans [Buprenorphine] Anaphylaxis     CYP 3A4 and 3A5 Mutation   • Codeine Anaphylaxis     CYP 3A4 and 3A5 Mutation   • Darvocet [Propoxyphene N-Apap] Anaphylaxis   • Fentanyl Anaphylaxis     CYP 3A4 and 3A5 Mutation  Per pt. Can have fentanyl but in small amounts. Per pt. Has tolerated fentanyl in past.      • Nucynta [Tapentadol] Anaphylaxis and Swelling   • Percocet [Oxycodone-Acetaminophen] Anaphylaxis   • Ambien [Zolpidem]      CYP 3A4 and 3A5 Mutation   • Biaxin [Clarithromycin]      CYP 3A4 and 3A5 Mutation   • Buspar [Buspirone]      CYP 3A4 and 3A5 Mutation   • Celexa      CYP 3A4 and 3A5 Mutation   • Citalopram      Other reaction(s): Other (See Comments)  CYP 3A4 and 3A5 Mutation  CYP 3A4 and 3A5  "Mutation   • Clindamycin    • Clonazepam      CYP 3A4 and 3A5 Mutation   • Demerol    • Diltiazem      CYP 3A4 and 3A5 Mutation   • Doxepin      CYP 3A4 and 3A5 Mutation   • Effexor [Venlafaxine]      CYP 3A4 and 3A5 Mutation   • Erythromycin      CYP 3A4 and 3A5 Mutation   • Haldol [Haloperidol]      CYP 3A4 and 3A5 Mutation   • Hydrocodone    • Lexapro      CYP 3A4 and 3A5 Mutation   • Lunesta      CYP 3A4 and 3A5 Mutation   • Lyrica Swelling   • Morphine Sulfate [Bupropion] Hives   • Remeron [Mirtazapine]      CYP 3A4 and 3A5 Mutation   • Serzone [Nefazodone]      CYP 3A4 and 3A5 Mutation   • Tape    • Tizanidine Hcl      Low blood pressure   • Valium      CYP 3A4 and 3A5 Mutation   • Xanax [Alprazolam]      CYP 3A4 and 3A5 Mutation   • Zonisamide    • Zyprexa      CYP 3A4 and 3A5 Mutation     Past Medical History:   Diagnosis Date   • Acute MI (HCC)     cardiologist, Dr. Hernández   • Allergy    • Anesthesia     Vomiting   • Arrhythmia     A-fib   • Arthritis     Spine, hips, knees   • Bowel habit changes     Constipation   • Cancer (HCC)     Skin    • Chickenpox    • Coronary artery disease    • CY deficiency    • CY gene mutation    • Diabetes (HCC)     Diet and oral medication   • Emphysema of lung (HCC)    • High cholesterol    • Hyperlipidemia    • Hypertension    • Muscle disorder    • Pain     Back and hips   • Personal history of venous thrombosis and embolism     left arm   • Rectal abscess    • Rheumatic fever as child   • Sleep apnea     CPAP   • Snoring    • Unspecified cataract     surgical correction bilateral        Objective:   /76   Pulse 67   Temp 36.4 °C (97.5 °F) (Tympanic)   Resp 16   Ht 1.702 m (5' 7\")   Wt 95.3 kg (210 lb)   SpO2 95%   BMI 32.89 kg/m²   Physical Exam  Vitals and nursing note reviewed.   Constitutional:       Appearance: Normal appearance. He is not ill-appearing, toxic-appearing or diaphoretic.   HENT:      Head: Normocephalic and atraumatic. "      Nose: No rhinorrhea.      Comments: No sinus tenderness     Mouth/Throat:      Mouth: Mucous membranes are moist.      Pharynx: No oropharyngeal exudate or posterior oropharyngeal erythema.   Eyes:      General: No scleral icterus.        Right eye: No discharge.         Left eye: No discharge.   Cardiovascular:      Rate and Rhythm: Normal rate and regular rhythm.      Heart sounds: Normal heart sounds. No murmur heard.  Pulmonary:      Effort: Pulmonary effort is normal. No respiratory distress.      Breath sounds: Normal breath sounds. No wheezing or rhonchi.   Musculoskeletal:         General: Normal range of motion.      Cervical back: Normal range of motion.      Right lower leg: No edema.      Left lower leg: No edema.   Lymphadenopathy:      Cervical: No cervical adenopathy.   Skin:     General: Skin is warm.      Findings: No rash.   Neurological:      General: No focal deficit present.      Mental Status: He is alert and oriented to person, place, and time.   Psychiatric:         Mood and Affect: Mood normal.         Behavior: Behavior normal.         Thought Content: Thought content normal.           Assessment/Plan:     Encounter Diagnoses   Name Primary?   • Acute viral syndrome    • COVID      Patient with COVID, given hisunderlying medical problems is interested in antiviral therapy if eligible.   reviewed patient's medications for contraindications, he cannot take Paxlovid due to the Xarelto treatment he is on among others.  However it appears he can take molnupiravir.  After discussion, the patient and son wish to proceed with the antiviral prescription, so this was provided.  Also provided a prescription for Tessalon for symptomatic relief of his cough.  We will continue to follow his oxygen saturations at home, if they worsen, remained persistently below 88%, he will go straight to the hospital for evaluation, and I recommended that if he was hypoxic it would be best to call 911 and have an  ambulance bring him oxygen and transport him.    See AVS Instructions below for written guidance provided to patient on after-visit management and care in addition to our verbal discussion during the visit.    Please note that this dictation was created using voice recognition software. I have attempted to correct all errors, but there may be sound-alike, spelling, grammar and possibly content errors that I did not discover before finalizing the note.

## 2022-08-15 ENCOUNTER — APPOINTMENT (OUTPATIENT)
Dept: RADIOLOGY | Facility: MEDICAL CENTER | Age: 82
End: 2022-08-15
Attending: EMERGENCY MEDICINE
Payer: MEDICARE

## 2022-08-15 ENCOUNTER — HOSPITAL ENCOUNTER (EMERGENCY)
Facility: MEDICAL CENTER | Age: 82
End: 2022-08-15
Attending: EMERGENCY MEDICINE
Payer: MEDICARE

## 2022-08-15 VITALS
WEIGHT: 210 LBS | RESPIRATION RATE: 16 BRPM | BODY MASS INDEX: 32.96 KG/M2 | OXYGEN SATURATION: 93 % | HEART RATE: 69 BPM | DIASTOLIC BLOOD PRESSURE: 68 MMHG | TEMPERATURE: 97.6 F | SYSTOLIC BLOOD PRESSURE: 151 MMHG | HEIGHT: 67 IN

## 2022-08-15 DIAGNOSIS — R07.9 CHEST PAIN, UNSPECIFIED TYPE: ICD-10-CM

## 2022-08-15 LAB
ALBUMIN SERPL BCP-MCNC: 4.2 G/DL (ref 3.2–4.9)
ALBUMIN/GLOB SERPL: 1.6 G/DL
ALP SERPL-CCNC: 88 U/L (ref 30–99)
ALT SERPL-CCNC: 28 U/L (ref 2–50)
ANION GAP SERPL CALC-SCNC: 17 MMOL/L (ref 7–16)
AST SERPL-CCNC: 22 U/L (ref 12–45)
BASOPHILS # BLD AUTO: 1.2 % (ref 0–1.8)
BASOPHILS # BLD: 0.1 K/UL (ref 0–0.12)
BILIRUB SERPL-MCNC: 0.5 MG/DL (ref 0.1–1.5)
BUN SERPL-MCNC: 15 MG/DL (ref 8–22)
CALCIUM SERPL-MCNC: 9.2 MG/DL (ref 8.5–10.5)
CHLORIDE SERPL-SCNC: 105 MMOL/L (ref 96–112)
CO2 SERPL-SCNC: 19 MMOL/L (ref 20–33)
CREAT SERPL-MCNC: 0.97 MG/DL (ref 0.5–1.4)
EKG IMPRESSION: NORMAL
EOSINOPHIL # BLD AUTO: 0.52 K/UL (ref 0–0.51)
EOSINOPHIL NFR BLD: 6.3 % (ref 0–6.9)
ERYTHROCYTE [DISTWIDTH] IN BLOOD BY AUTOMATED COUNT: 49.9 FL (ref 35.9–50)
GFR SERPLBLD CREATININE-BSD FMLA CKD-EPI: 79 ML/MIN/1.73 M 2
GLOBULIN SER CALC-MCNC: 2.6 G/DL (ref 1.9–3.5)
GLUCOSE SERPL-MCNC: 125 MG/DL (ref 65–99)
HCT VFR BLD AUTO: 43.2 % (ref 42–52)
HGB BLD-MCNC: 14.6 G/DL (ref 14–18)
IMM GRANULOCYTES # BLD AUTO: 0.05 K/UL (ref 0–0.11)
IMM GRANULOCYTES NFR BLD AUTO: 0.6 % (ref 0–0.9)
LACTATE SERPL-SCNC: 1.4 MMOL/L (ref 0.5–2)
LACTATE SERPL-SCNC: 3.5 MMOL/L (ref 0.5–2)
LYMPHOCYTES # BLD AUTO: 2.25 K/UL (ref 1–4.8)
LYMPHOCYTES NFR BLD: 27.3 % (ref 22–41)
MCH RBC QN AUTO: 31.1 PG (ref 27–33)
MCHC RBC AUTO-ENTMCNC: 33.8 G/DL (ref 33.7–35.3)
MCV RBC AUTO: 92.1 FL (ref 81.4–97.8)
MONOCYTES # BLD AUTO: 0.55 K/UL (ref 0–0.85)
MONOCYTES NFR BLD AUTO: 6.7 % (ref 0–13.4)
NEUTROPHILS # BLD AUTO: 4.76 K/UL (ref 1.82–7.42)
NEUTROPHILS NFR BLD: 57.9 % (ref 44–72)
NRBC # BLD AUTO: 0 K/UL
NRBC BLD-RTO: 0 /100 WBC
NT-PROBNP SERPL IA-MCNC: 179 PG/ML (ref 0–125)
PLATELET # BLD AUTO: 193 K/UL (ref 164–446)
PMV BLD AUTO: 10.5 FL (ref 9–12.9)
POTASSIUM SERPL-SCNC: 3.8 MMOL/L (ref 3.6–5.5)
PROT SERPL-MCNC: 6.8 G/DL (ref 6–8.2)
RBC # BLD AUTO: 4.69 M/UL (ref 4.7–6.1)
SODIUM SERPL-SCNC: 141 MMOL/L (ref 135–145)
TROPONIN T SERPL-MCNC: 9 NG/L (ref 6–19)
TROPONIN T SERPL-MCNC: 9 NG/L (ref 6–19)
WBC # BLD AUTO: 8.2 K/UL (ref 4.8–10.8)

## 2022-08-15 PROCEDURE — 700105 HCHG RX REV CODE 258: Performed by: EMERGENCY MEDICINE

## 2022-08-15 PROCEDURE — 83605 ASSAY OF LACTIC ACID: CPT | Mod: 91

## 2022-08-15 PROCEDURE — 700117 HCHG RX CONTRAST REV CODE 255: Performed by: EMERGENCY MEDICINE

## 2022-08-15 PROCEDURE — 83880 ASSAY OF NATRIURETIC PEPTIDE: CPT

## 2022-08-15 PROCEDURE — 84484 ASSAY OF TROPONIN QUANT: CPT

## 2022-08-15 PROCEDURE — 99284 EMERGENCY DEPT VISIT MOD MDM: CPT

## 2022-08-15 PROCEDURE — 80053 COMPREHEN METABOLIC PANEL: CPT

## 2022-08-15 PROCEDURE — 71275 CT ANGIOGRAPHY CHEST: CPT | Mod: ME

## 2022-08-15 PROCEDURE — 85025 COMPLETE CBC W/AUTO DIFF WBC: CPT

## 2022-08-15 PROCEDURE — 93005 ELECTROCARDIOGRAM TRACING: CPT | Performed by: EMERGENCY MEDICINE

## 2022-08-15 PROCEDURE — 71045 X-RAY EXAM CHEST 1 VIEW: CPT

## 2022-08-15 PROCEDURE — 36415 COLL VENOUS BLD VENIPUNCTURE: CPT

## 2022-08-15 PROCEDURE — 74176 CT ABD & PELVIS W/O CONTRAST: CPT | Mod: ME

## 2022-08-15 PROCEDURE — 70450 CT HEAD/BRAIN W/O DYE: CPT | Mod: ME

## 2022-08-15 RX ORDER — SODIUM CHLORIDE 9 MG/ML
1000 INJECTION, SOLUTION INTRAVENOUS ONCE
Status: COMPLETED | OUTPATIENT
Start: 2022-08-15 | End: 2022-08-15

## 2022-08-15 RX ADMIN — IOHEXOL 50 ML: 350 INJECTION, SOLUTION INTRAVENOUS at 02:15

## 2022-08-15 RX ADMIN — SODIUM CHLORIDE 1000 ML: 9 INJECTION, SOLUTION INTRAVENOUS at 03:06

## 2022-08-15 ASSESSMENT — FIBROSIS 4 INDEX: FIB4 SCORE: 1.32

## 2022-08-15 NOTE — ED PROVIDER NOTES
"ED Provider Note    Scribed for Je Servin by Lio Pugh. 8/15/2022  1:01 AM    Primary care provider: Perry Canchola M.D.  Means of arrival: EMS  History obtained from: Patient  History limited by: None    CHIEF COMPLAINT  Chief Complaint   Patient presents with    Chest Pain     Pt reports mid sternal CP starting 40 min ago. Pt reports L side jaw pain, neck and L shoulder pain. Pt has hx of 2 previous MI, pt states \"this is way harsher\"     HPI  Amarjit Khan is a 82 y.o. male with a history of two heart attacks and A-fibrillation who presents to the Emergency Department for a chest pain onset 40 minutes ago. He described that his chest pain was a steady, stabbing pain. Per patient, he was sitting and watching TV. The patient notes he was not exerting any pressure or active at the time. His symptoms began in the following order: head ache, arm pain, jaw pain, and then chest pain. The patient describes that when his symptoms onset, they felt \"harsh.\" The patient took 4 low-dose aspirin to alleviate, with his pain resolving 15-20 minutes upon arrival to the ED, however, he still has a slight head ache. The patient was not treated with Nitroglycerin in triage. Per wife, the patient had to have four stents put in and went through an open heart surgery for his second heart attack. The patient associates generalized weakness, head ache, cough, and constant sharp toe pain that radiated to his left arm and jaw, but denies fever, uneven leg swelling, or numbness and tingling on arms. The patient had COVID-19 three weeks ago, which he notes he has the booster shot against. The patient currently is on Xarelto and it taking it regularly. Denies smoking tobacco. He drank a glass of wine today and takes oral Marijuana for his back pain.       Quality:\"steady and stabbing\"  Duration: 30 minutes  Severity: Sharp  Associated sx: generalized weakness, head ache, cough, and constant sharp toe pain that radiated " to his left arm and jaw    REVIEW OF SYSTEMS  As above, all other systems reviewed and are negative.   See HPI for further details.     PAST MEDICAL HISTORY   has a past medical history of Acute MI (HCC) (), Allergy, Anesthesia, Arrhythmia, Arthritis, Bowel habit changes, Cancer (), Chickenpox, Coronary artery disease, CY deficiency, CY gene mutation, Diabetes (), Emphysema of lung (), High cholesterol, Hyperlipidemia, Hypertension, Muscle disorder, Pain, Personal history of venous thrombosis and embolism (), Rectal abscess, Rheumatic fever (as child), Sleep apnea, Snoring, and Unspecified cataract.  SURGICAL HISTORY   has a past surgical history that includes anal fistulotomy (08); percut implnt neuroelect,epidural (7/15/2015); percut implnt neuroelect,epidural (7/15/2015); cataract extraction with iol (Bilateral, ); stent placement (,); nerve ulnar transfer (Right, ); lumbar laminectomy diskectomy (); hip arthroplasty total (Left, 2015); spinal cord stimulator (2015); eye surgery; temporal artery ligatn or bx (Right, 2020); knee arthrotomy (Right, ); knee arthrotomy (Right, ); knee arthroscopy (Left, ); elbow arthrotomy (Left, ); shoulder arthrotomy (Right, ); and implant neurostim/ (N/A, 2021).  SOCIAL HISTORY  Social History     Tobacco Use    Smoking status: Never    Smokeless tobacco: Never   Vaping Use    Vaping Use: Never used   Substance Use Topics    Alcohol use: Yes     Comment: Occasional    Drug use: Yes     Types: Marijuana, Oral     Comment: Occasionally for back pain      Social History     Substance and Sexual Activity   Drug Use Yes    Types: Marijuana, Oral    Comment: Occasionally for back pain     FAMILY HISTORY  Family History   Problem Relation Age of Onset    Diabetes Mother     Heart Disease Mother     Hyperlipidemia Mother     Sleep Apnea Son      CURRENT MEDICATIONS  Home Medications        Reviewed by Imelda Jules R.N. (Registered Nurse) on 08/15/22 at 0053  Med List Status: Not Addressed     Medication Last Dose Status   acetaminophen (TYLENOL) 500 MG Tab  Active   aspirin (ASA) 81 MG Chew Tab chewable tablet  Active   atorvastatin (LIPITOR) 80 MG tablet  Active   benzonatate (TESSALON) 100 MG Cap  Active   Cholecalciferol (VITAMIN D) 2000 UNIT CAPS  Active   DULoxetine (CYMBALTA) 60 MG Cap DR Particles delayed-release capsule  Active   Empagliflozin (JARDIANCE) 10 MG Tab  Active   HYDROmorphone (DILAUDID) 2 MG Tab  Active   lisinopril (PRINIVIL) 20 MG Tab  Active   metoprolol SR (TOPROL XL) 25 MG TABLET SR 24 HR  Active   tamsulosin (FLOMAX) 0.4 MG capsule  Active   XARELTO 20 MG Tab tablet  Active                  ALLERGIES  Allergies   Allergen Reactions    Butrans [Buprenorphine] Anaphylaxis     CYP 3A4 and 3A5 Mutation    Codeine Anaphylaxis     CYP 3A4 and 3A5 Mutation    Darvocet [Propoxyphene N-Apap] Anaphylaxis    Fentanyl Anaphylaxis     CYP 3A4 and 3A5 Mutation  Per pt. Can have fentanyl but in small amounts. Per pt. Has tolerated fentanyl in past.       Nucynta [Tapentadol] Anaphylaxis and Swelling    Percocet [Oxycodone-Acetaminophen] Anaphylaxis    Ambien [Zolpidem]      CYP 3A4 and 3A5 Mutation    Biaxin [Clarithromycin]      CYP 3A4 and 3A5 Mutation    Buspar [Buspirone]      CYP 3A4 and 3A5 Mutation    Celexa      CYP 3A4 and 3A5 Mutation    Citalopram      Other reaction(s): Other (See Comments)  CYP 3A4 and 3A5 Mutation  CYP 3A4 and 3A5 Mutation    Clindamycin     Clonazepam      CYP 3A4 and 3A5 Mutation    Demerol     Diltiazem      CYP 3A4 and 3A5 Mutation    Doxepin      CYP 3A4 and 3A5 Mutation    Effexor [Venlafaxine]      CYP 3A4 and 3A5 Mutation    Erythromycin      CYP 3A4 and 3A5 Mutation    Haldol [Haloperidol]      CYP 3A4 and 3A5 Mutation    Hydrocodone     Lexapro      CYP 3A4 and 3A5 Mutation    Lunesta      CYP 3A4 and 3A5 Mutation    Lyrica Swelling    Morphine  Sulfate [Bupropion] Hives    Remeron [Mirtazapine]      CYP 3A4 and 3A5 Mutation    Serzone [Nefazodone]      CYP 3A4 and 3A5 Mutation    Tape     Tizanidine Hcl      Low blood pressure    Valium      CYP 3A4 and 3A5 Mutation    Xanax [Alprazolam]      CYP 3A4 and 3A5 Mutation    Zonisamide     Zyprexa      CYP 3A4 and 3A5 Mutation       PHYSICAL EXAM    VITAL SIGNS:   Vitals:    08/15/22 0301 08/15/22 0306 08/15/22 0431 08/15/22 0454   BP: 112/55  134/65    Pulse: 76 75 69 72   Resp:  (!) 10 (!) 10 12   Temp:       TempSrc:       SpO2: 90% 91% 93% 90%   Weight:       Height:           Vitals: My interpretation: normotensive, not tachycardic, afebrile, not hypoxic    Reinterpretation of vitals: Unremarkable    Cardiac Monitor Interpretation: The cardiac monitor revealed normal Sinus Rhythm  as interpreted by me. The cardiac monitor was ordered secondary to the patient's history of chest pain and to monitor for dysrhythmia and/or tachycardia.    PE:   Constitutional: Well developed, Well nourished, No acute distress, Non-toxic appearance.   HENT: Normocephalic, Atraumatic, Bilateral external ears normal, Oropharynx is clear mucous membranes are moist. No oral exudates or nasal discharge.   Eyes: Pupils are equal round and reactive, EOMI, Conjunctiva normal, No discharge.   Neck: Normal range of motion, No tenderness, Supple, No stridor. No meningismus.  Lymphatic: No lymphadenopathy noted.   Cardiovascular: Regular rate and rhythm without murmur rub or gallop.  Thorax & Lungs: Clear breath sounds bilaterally without wheezes, rhonchi or rales. There is no chest wall tenderness.   Abdomen: Soft non-tender non-distended. There is no rebound or guarding. No organomegaly is appreciated. Bowel sounds are normal.  Skin: Normal without rash.   Back: No CVA or spinal tenderness.   Extremities: Intact distal pulses, No edema, No tenderness, No cyanosis, No clubbing. Capillary refill is less than 2 seconds.  Musculoskeletal:  Good range of motion in all major joints. No tenderness to palpation or major deformities noted.   Neurologic: Alert & oriented x 3, Normal motor function, Normal sensory function, No focal deficits noted. Reflexes are normal.  Psychiatric: Affect normal, Judgment normal, Mood normal. There is no suicidal ideation or patient reported hallucinations.     LABS  Results for orders placed or performed during the hospital encounter of 08/15/22   CBC with Differential   Result Value Ref Range    WBC 8.2 4.8 - 10.8 K/uL    RBC 4.69 (L) 4.70 - 6.10 M/uL    Hemoglobin 14.6 14.0 - 18.0 g/dL    Hematocrit 43.2 42.0 - 52.0 %    MCV 92.1 81.4 - 97.8 fL    MCH 31.1 27.0 - 33.0 pg    MCHC 33.8 33.7 - 35.3 g/dL    RDW 49.9 35.9 - 50.0 fL    Platelet Count 193 164 - 446 K/uL    MPV 10.5 9.0 - 12.9 fL    Neutrophils-Polys 57.90 44.00 - 72.00 %    Lymphocytes 27.30 22.00 - 41.00 %    Monocytes 6.70 0.00 - 13.40 %    Eosinophils 6.30 0.00 - 6.90 %    Basophils 1.20 0.00 - 1.80 %    Immature Granulocytes 0.60 0.00 - 0.90 %    Nucleated RBC 0.00 /100 WBC    Neutrophils (Absolute) 4.76 1.82 - 7.42 K/uL    Lymphs (Absolute) 2.25 1.00 - 4.80 K/uL    Monos (Absolute) 0.55 0.00 - 0.85 K/uL    Eos (Absolute) 0.52 (H) 0.00 - 0.51 K/uL    Baso (Absolute) 0.10 0.00 - 0.12 K/uL    Immature Granulocytes (abs) 0.05 0.00 - 0.11 K/uL    NRBC (Absolute) 0.00 K/uL   Complete Metabolic Panel (CMP)   Result Value Ref Range    Sodium 141 135 - 145 mmol/L    Potassium 3.8 3.6 - 5.5 mmol/L    Chloride 105 96 - 112 mmol/L    Co2 19 (L) 20 - 33 mmol/L    Anion Gap 17.0 (H) 7.0 - 16.0    Glucose 125 (H) 65 - 99 mg/dL    Bun 15 8 - 22 mg/dL    Creatinine 0.97 0.50 - 1.40 mg/dL    Calcium 9.2 8.5 - 10.5 mg/dL    AST(SGOT) 22 12 - 45 U/L    ALT(SGPT) 28 2 - 50 U/L    Alkaline Phosphatase 88 30 - 99 U/L    Total Bilirubin 0.5 0.1 - 1.5 mg/dL    Albumin 4.2 3.2 - 4.9 g/dL    Total Protein 6.8 6.0 - 8.2 g/dL    Globulin 2.6 1.9 - 3.5 g/dL    A-G Ratio 1.6 g/dL    Troponin   Result Value Ref Range    Troponin T 9 6 - 19 ng/L   ESTIMATED GFR   Result Value Ref Range    GFR (CKD-EPI) 79 >60 mL/min/1.73 m 2   LACTIC ACID   Result Value Ref Range    Lactic Acid 3.5 (H) 0.5 - 2.0 mmol/L   proBrain Natriuretic Peptide, NT   Result Value Ref Range    NT-proBNP 179 (H) 0 - 125 pg/mL   LACTIC ACID   Result Value Ref Range    Lactic Acid 1.4 0.5 - 2.0 mmol/L   TROPONIN   Result Value Ref Range    Troponin T 9 6 - 19 ng/L   EKG   Result Value Ref Range    Report       [facility] Emergency Dept[facility]    Test Date:  2022-08-15  Pt Name:    MAGDA MOREIRA                   Department: ER  MRN:        [id number]                      Room:       RD 12  Gender:     Male                         Technician: [id number]  :        [id number]                   Requested By:ER TRIAGE PROTOCOL  Order #:    [id number]                    Reading MD: Je Servin    Measurements  Intervals                                Axis  Rate:       86                           P:          33  CT:         260                          QRS:        -72  QRSD:       134                          T:          78  QT:         387  QTc:        463    Interpretive Statements  Sinus rhythm  Prolonged CT interval  Left bundle branch block  Baseline wander in lead(s) V1  Compared to ECG 10/18/2021 11:56:45  Left bundle-branch block now present  Ventricular premature complex(es) no longer present  Left anterior fascicular block no longer present  Intraventricular conduction delay n o longer present  Electronically Signed On 8- 5:36:11 PDT by Je Servin        All labs reviewed by me. Significant for no leukocytosis, no anemia, normal electrolytes other than mild dehydration with bicarb of 19, normal renal function, normal liver enzymes, normal bilirubin, troponin negative, lactic acid initially 3.5 but repeat 1.4, BNP normal, second troponin of 9    RADIOLOGY  CT-ABDOMEN-PELVIS W/O   Final Result          1.  Hepatomegaly and slight irregular hepatic contour, appearance favors changes of cirrhosis.   2.  Slight hazy mesenteric fat stranding of the mesenteric root, could represent changes of mesenteric adenitis, consider panniculitis or sclerosing mesenteritis in the appropriate clinical setting.   3.  Cholelithiasis   4.  Diverticulosis   5.  Atherosclerosis and atherosclerotic coronary artery disease      CT-CTA CHEST PULMONARY ARTERY W/ RECONS   Final Result         1.  No pulmonary embolus appreciated.   2.  Cholelithiasis   3.  Small hiatal hernia   4.  Atherosclerosis and atherosclerotic coronary artery disease.      CT-HEAD W/O   Final Result         1.  No acute intracranial abnormality is identified, there are nonspecific white matter changes, commonly associated with small vessel ischemic disease.  Associated mild cerebral atrophy is noted.   2.  Atherosclerosis.         DX-CHEST-PORTABLE (1 VIEW)   Final Result         1.  No acute cardiopulmonary disease.   2.  Atherosclerosis        The radiologist's interpretation of all radiological studies have been reviewed by me.    COURSE & MEDICAL DECISION MAKING  Nursing notes, VS, PMSFHx, labs, imaging, EKG reviewed in chart.    Heart score: Moderate.     MDM: 1:01 AM Amarjit Khan is a 82 y.o. male who presented with with nonexertional, nonradiating, nonpleuritic chest discomfort that started an hour prior to arrival and was central in location the chest.  He does have a cardiac history.  Does not feel similar to his prior heart attacks.  Vital signs are unremarkable x2.  Initial EKG shows no ischemic changes.  Chest x-ray unremarkable.  He did state that he also had a mild headache that started with his chest pain and CT head was done to rule out subarachnoid hemorrhage and was negative.  He is on anticoagulation on Xarelto for A. fib remotely, but a CTA was done to evaluate for aortic or pulmonary abnormality and was essentially negative.  His troponin  x2 is unremarkable.  CBC and CMP show mild dehydration but otherwise within normal limits.  His BNP was normal.  He was given a liter of fluids for dehydration.  He did have initial lactic acid of 3.5, however I suspect that this is possibly erroneous as may be the tourniquet was on or the blood set out as it is no signs of infection and a repeat was 1.4.  He was updated regarding his normal work-up.  He has no symptoms at this time.  Is resting comfortably, in no acute distress.  His vital signs are normal at time of discharge.  He is ambulating unassisted and without any discomfort.  His family is updated as well and they verbalized understanding strict return precautions and close PCP follow-up in 40 hours for recheck.    FINAL IMPRESSION  1. Chest pain, unspecified type Acute         Lio PENG (Rickie), am scribing for, and in the presence of, Je Servin.    Electronically signed by: Lio Pugh (Rickie), 8/15/2022    I, Je Servin personally performed the services described in this documentation, as scribed by Lio Pugh in my presence, and it is both accurate and complete. C    The note accurately reflects work and decisions made by me.  Je Servin  8/15/2022  5:37 AM

## 2022-08-15 NOTE — ED TRIAGE NOTES
"Chief Complaint   Patient presents with    Chest Pain     Pt reports mid sternal CP starting 40 min ago. Pt reports L side jaw pain, neck and L shoulder pain. Pt has hx of 2 previous MI, pt states \"this is way harsher\"       BIB EMS to [facility], pt on monitor and in gown, labs drawn and sent. Pt reports mid sternal CP x 40 min along with L jaw, neck and shoulder/back pain.  Pt has hx of 2 previous MI. Pt states this seems worse than previous MI. Pt states he was watching TV when the CP began.  Hx Afib    Medications given en route:324 mg ASA    /66   Pulse 86   Temp 36.3 °C (97.4 °F) (Temporal)   Resp 16   Ht 1.702 m (5' 7\")   Wt 95.3 kg (210 lb)   SpO2 92%   BMI 32.89 kg/m²    "

## 2022-08-15 NOTE — DISCHARGE INSTRUCTIONS
I want you to follow-up with your PCP in a few days for recheck.  Your work-up tonight was very reassuring and there are no signs of a heart attack, blood clot or other abnormality such as pneumonia.  It is unclear exactly what caused your chest discomfort tonight, but if it does come back or worsens or you have lightheadedness, dizziness, nausea, vomiting or other concerns, please return to the ED for further evaluation treatment.  Thank you for coming in today.

## 2022-08-16 ENCOUNTER — TELEPHONE (OUTPATIENT)
Dept: SOCIAL WORK | Facility: CLINIC | Age: 82
End: 2022-08-16
Payer: MEDICARE

## 2022-08-16 ENCOUNTER — TELEPHONE (OUTPATIENT)
Dept: SCHEDULING | Facility: IMAGING CENTER | Age: 82
End: 2022-08-16

## 2022-08-16 NOTE — TELEPHONE ENCOUNTER
ED Follow-up  Call Attempts:  1  Date of ED visit: 08/15/22  Call Outcome: Reviewed ED visit with patient  Since you've been home, how have you been feeling?: Better  Were you prescribed any medications?: No  Do you have any questions about your discharge instructions?: No  RN Recommendations: Other  Additional details: Member stated that PCP Dr. Canchola has left and he needs to establish with a new PCP. I gave the member Customer Service phone number  @ [facility] to get assigned a new PCP and then ask to be transferred to the  at the location the new PCP is at for a f/u appt. Member verbalized understanding.   Total time spent (mins): 5

## 2022-08-23 ENCOUNTER — OFFICE VISIT (OUTPATIENT)
Dept: MEDICAL GROUP | Facility: MEDICAL CENTER | Age: 82
End: 2022-08-23
Payer: MEDICARE

## 2022-08-23 VITALS
DIASTOLIC BLOOD PRESSURE: 64 MMHG | TEMPERATURE: 97.5 F | OXYGEN SATURATION: 96 % | BODY MASS INDEX: 29.39 KG/M2 | HEIGHT: 69 IN | SYSTOLIC BLOOD PRESSURE: 108 MMHG | HEART RATE: 75 BPM | WEIGHT: 198.41 LBS

## 2022-08-23 DIAGNOSIS — M54.6 ACUTE THORACIC BACK PAIN, UNSPECIFIED BACK PAIN LATERALITY: ICD-10-CM

## 2022-08-23 DIAGNOSIS — G47.33 OBSTRUCTIVE SLEEP APNEA SYNDROME: ICD-10-CM

## 2022-08-23 DIAGNOSIS — E11.9 TYPE 2 DIABETES MELLITUS WITHOUT COMPLICATION, WITHOUT LONG-TERM CURRENT USE OF INSULIN (HCC): ICD-10-CM

## 2022-08-23 DIAGNOSIS — I48.0 PAROXYSMAL ATRIAL FIBRILLATION (HCC): ICD-10-CM

## 2022-08-23 DIAGNOSIS — I10 PRIMARY HYPERTENSION: ICD-10-CM

## 2022-08-23 DIAGNOSIS — Z09 HOSPITAL DISCHARGE FOLLOW-UP: ICD-10-CM

## 2022-08-23 DIAGNOSIS — I25.83 CORONARY ARTERY DISEASE DUE TO LIPID RICH PLAQUE: ICD-10-CM

## 2022-08-23 DIAGNOSIS — I25.10 CORONARY ARTERY DISEASE DUE TO LIPID RICH PLAQUE: ICD-10-CM

## 2022-08-23 PROCEDURE — 99214 OFFICE O/P EST MOD 30 MIN: CPT | Performed by: INTERNAL MEDICINE

## 2022-08-23 ASSESSMENT — FIBROSIS 4 INDEX: FIB4 SCORE: 1.72

## 2022-08-23 ASSESSMENT — ENCOUNTER SYMPTOMS
VOMITING: 0
FEVER: 0
BACK PAIN: 1
PALPITATIONS: 1
CHILLS: 0
NAUSEA: 0
COUGH: 1

## 2022-08-24 ENCOUNTER — NON-PROVIDER VISIT (OUTPATIENT)
Dept: MEDICAL GROUP | Facility: MEDICAL CENTER | Age: 82
End: 2022-08-24
Payer: MEDICARE

## 2022-08-24 NOTE — ASSESSMENT & PLAN NOTE
Results of the blood work were reviewed with patient, medication list updated.  Awaiting stress test to be scheduled with cardiologist.

## 2022-08-24 NOTE — ASSESSMENT & PLAN NOTE
Under care of the pain specialist, able to take Tylenol as needed, allergic to most of the pain medications.

## 2022-08-24 NOTE — ASSESSMENT & PLAN NOTE
Chronic, controlled on beta-blocker, baby aspirin and atorvastatin, will follow-up with cardiology.

## 2022-08-24 NOTE — PROGRESS NOTES
Subjective:     Chief Complaint   Patient presents with    [facility]    Cough     3wks       Diagnoses of Acute thoracic back pain, unspecified back pain laterality, Coronary artery disease due to lipid rich plaque, Paroxysmal atrial fibrillation (HCC), Obstructive sleep apnea syndrome, Type 2 diabetes mellitus without complication, without long-term current use of insulin (HCC), Hospital discharge follow-up, and Primary hypertension were pertinent to this visit.    HISTORY OF THE PRESENT ILLNESS: Patient is a 82 y.o. male. This pleasant patient is here today to establish care. His prior PCP was Dr. Canchola.     He is here today with his wife Makeda to establish care, they were patients of Dr. Medeiros previously who have recently left [facility].  They have multiple chronic conditions and in my opinion, they would benefit from referral to the [facility] clinic, where longer appointments can be accommodated, so all the concerns may be addressed properly.    Problem   Hospital Discharge Follow-Up    Patient was admitted to emergency room on 8/15/2022 due to severe chest pain.  At that time ACS was ruled out, CT of the head, chest and abdomen were unremarkable.  The pain have improved, patient was awaiting stress test with his cardiologist.     Back Pain    This is a chronic pain, patient is allergic to most of the pain medications.  He has finally found PT Thang Carballo who have been really helpful.  Patient walked with cane today, however generally he is able to ambulate without it.       Paroxysmal Atrial Fibrillation (Hcc)    Controlled with metoprolol and anticoagulated with Xarelto, follow-up with cardiology.      Type 2 Diabetes Mellitus Without Complication, Without Long-Term Current Use of Insulin (Hcc)    Lab Results   Component Value Date/Time    HBA1C 6.3 (H) 04/28/2022 12:43 PM   A1c at goal, patient tells me that he lost significant amount of weight in attempt to control his diabetes after his  diagnosis 2 years ago.  He is currently taking Jardiance 10 mg tablet daily       Cad (Coronary Artery Disease)    History of myocardial infarction, status post stents.  On baby aspirin, metoprolol and atorvastatin.  Follows with cardiology at [facility]. Madden.     Htn (Hypertension)    BP controlled on current regimen of lisinopril 10 mg daily, metoprolol 12.5 mg twice daily.     Obstructive Sleep Apnea Syndrome    CPAP        Past Medical History:   Diagnosis Date    Acute MI (HCC)     cardiologist, Dr. Hernández    Allergy     Anesthesia     Vomiting    Arrhythmia     A-fib    Arthritis     Spine, hips, knees    Bowel habit changes     Constipation    Cancer (HCC)     Skin     Chickenpox     Coronary artery disease     CY deficiency     CY gene mutation     Diabetes (HCC)     Diet and oral medication    Emphysema of lung (HCC)     High cholesterol     Hyperlipidemia     Hypertension     Muscle disorder     Pain     Back and hips    Personal history of venous thrombosis and embolism     left arm    Rectal abscess     Rheumatic fever as child    Sleep apnea     CPAP    Snoring     Unspecified cataract     surgical correction bilateral     Past Surgical History:   Procedure Laterality Date    PB IMPLANT NEUROSTIM/ N/A 2021    Procedure: REMOVAL NEUROSTIMULATOR, PERMANENT, SPINAL CORD;  Surgeon: Margarito Goode M.D.;  Location: SURGERY [facility];  Service: Pain Management    LA TEMPORAL ARTERY LIGATN OR BX Right 2020    Procedure: BIOPSY, ARTERY, TEMPORAL;  Surgeon: Perry Vale M.D.;  Location: [facility];  Service: Vascular    SPINAL CORD STIMULATOR  2015    Procedure: SPINAL CORD STIMULATOR PERC PERM;  Surgeon: Margarito Goode M.D.;  Location: [facility];  Service:     LA PERCUT IMPLNT NEUROELECT,EPIDURAL  7/15/2015    Procedure: IMPLANT NEUROSTIM EPI ARRAY - SPINAL CORD STIM TRIAL [employer];  Surgeon: Margarito Goode M.D.;  Location:  [facility];  Service: Pain Management    NJ PERCUT IMPLNT NEUROELECT,EPIDURAL  7/15/2015    Procedure: IMPLANT NEUROSTIM EPI ARRAY;  Surgeon: Margarito Goode M.D.;  Location: [facility];  Service: Pain Management    HIP ARTHROPLASTY TOTAL Left 2/2015    CATARACT EXTRACTION WITH IOL Bilateral 2010    ANAL FISTULOTOMY  8/27/08    Performed by ELLY RAMOS at SURGERY SAME DAY [facility]    SHOULDER ARTHROTOMY Right 2006    NERVE ULNAR TRANSFER Right 2004    ELBOW ARTHROTOMY Left 1998    KNEE ARTHROSCOPY Left 1995    KNEE ARTHROTOMY Right 1992    KNEE ARTHROTOMY Right 1990    LUMBAR LAMINECTOMY DISKECTOMY  1988    EYE SURGERY      STENT PLACEMENT  2005,2010     Family History   Problem Relation Age of Onset    Diabetes Mother     Heart Disease Mother     Hyperlipidemia Mother     Sleep Apnea Son      Social History     Tobacco Use    Smoking status: Never    Smokeless tobacco: Never   Vaping Use    Vaping Use: Never used   Substance Use Topics    Alcohol use: Yes     Comment: Occasional    Drug use: Yes     Types: Marijuana, Oral     Comment: Occasionally for back pain     Current Outpatient Medications Ordered in Epic   Medication Sig Dispense Refill    atorvastatin (LIPITOR) 80 MG tablet Take 0.5 Tablets by mouth every day. 50 Tablet 1    DULoxetine (CYMBALTA) 60 MG Cap DR Particles delayed-release capsule Take 1 Capsule by mouth every day. 90 Capsule 1    Empagliflozin (JARDIANCE) 10 MG Tab Take 1 Tablet by mouth every day. 100 Tablet 4    metoprolol SR (TOPROL XL) 25 MG TABLET SR 24 HR Take 0.5 Tablets by mouth 2 times a day. 100 Tablet 1    XARELTO 20 MG Tab tablet Take 1 Tablet by mouth with dinner. 90 Tablet 1    lisinopril (PRINIVIL) 20 MG Tab Take 0.5 Tablets by mouth every day. 30 Tablet 0    aspirin (ASA) 81 MG Chew Tab chewable tablet Chew 81 mg every evening. 100 Tab 0    Cholecalciferol (VITAMIN D) 2000 UNIT CAPS Take 2,000 Units by mouth every day.      benzonatate  "(TESSALON) 100 MG Cap Take 1 Capsule by mouth 3 times a day as needed for Cough. (Patient not taking: Reported on 8/23/2022) 30 Capsule 0    acetaminophen (TYLENOL) 500 MG Tab Take 1,000 mg by mouth 1 time a day as needed. Indications: Pain (Patient not taking: Reported on 8/23/2022)       No current [facility]-ordered facility-administered medications on file.     Health Maintenance: address at the next visit     Review of Systems   Constitutional:  Negative for chills and fever.   Respiratory:  Positive for cough (post COVID-19).    Cardiovascular:  Positive for chest pain (crhonic midsternal over a year) and palpitations (ocasional).   Gastrointestinal:  Negative for nausea and vomiting.   Musculoskeletal:  Positive for back pain.     Objective:     Exam: /64 (BP Location: Left arm, Patient Position: Sitting, BP Cuff Size: Adult)   Pulse 75   Temp 36.4 °C (97.5 °F) (Temporal)   Ht 1.753 m (5' 9\")   Wt 90 kg (198 lb 6.6 oz)   SpO2 96%  Body mass index is 29.3 kg/m².    Physical Exam  Constitutional:       Appearance: Normal appearance.   HENT:      Head: Normocephalic and atraumatic.      Nose: Nose normal.   Cardiovascular:      Rate and Rhythm: Normal rate and regular rhythm.      Pulses: Normal pulses.      Heart sounds: Normal heart sounds. No murmur heard.  Pulmonary:      Effort: Pulmonary effort is normal.      Breath sounds: Normal breath sounds.   Neurological:      Mental Status: He is alert and oriented to person, place, and time.   Psychiatric:         Mood and Affect: Mood normal.     Labs: Reviewed CBC, CMP, troponin, lactic acid proBNP from 8/15/2022    Assessment & Plan:   82 y.o. male with the following -    Problem List Items Addressed This Visit       Back pain     Under care of the pain specialist, able to take Tylenol as needed, allergic to most of the pain medications.         CAD (coronary artery disease)     Chronic, controlled on beta-blocker, baby aspirin and atorvastatin, will " follow-up with cardiology.         Hospital discharge follow-up     Results of the blood work were reviewed with patient, medication list updated.  Awaiting stress test to be scheduled with cardiologist.         HTN (hypertension)     BP controlled, continue current regimen of lisinopril and metoprolol without changes.         Obstructive sleep apnea syndrome    Paroxysmal atrial fibrillation (HCC)     Controlled, continue metoprolol and anticoagulation with Xarelto.         Type 2 diabetes mellitus without complication, without long-term current use of insulin (HCC)     Controlled on Jardiance, continue without changes.  Obtain POCT A1c in clinic at the next appointment            Return in about 1 week (around 8/30/2022).  For assisted living forms completion.      Please note that this dictation was created using voice recognition software. I have made every reasonable attempt to correct obvious errors, but I expect that there are errors of grammar and possibly content that I did not discover before finalizing the note.

## 2022-08-24 NOTE — PROGRESS NOTES
Amarjit Khan is a 82 y.o. male here for a non-provider visit for PPD placement -- Step 1 of 2    Reason for PPD:  nursing home requirement    1. TB evaluation questionnaire completed by patient? Yes      -  If any answers marked yes did you contact a provider prior to placing? Yes  2.  Patient notified to return to clinic for reading on: 8/26  3.  PPD Placement documentation completed on TB evaluation questionnaire? Yes  4.  Location of TB evaluation questionnaire filed: [facility]

## 2022-08-24 NOTE — ASSESSMENT & PLAN NOTE
Controlled on Jardiance, continue without changes.  Obtain POCT A1c in clinic at the next appointment

## 2022-08-25 ENCOUNTER — HOSPITAL ENCOUNTER (OUTPATIENT)
Facility: MEDICAL CENTER | Age: 82
End: 2022-08-25
Attending: STUDENT IN AN ORGANIZED HEALTH CARE EDUCATION/TRAINING PROGRAM
Payer: MEDICARE

## 2022-08-25 LAB — PSA SERPL-MCNC: 17.5 NG/ML (ref 0–4)

## 2022-08-25 PROCEDURE — 84153 ASSAY OF PSA TOTAL: CPT

## 2022-08-30 ENCOUNTER — OFFICE VISIT (OUTPATIENT)
Dept: MEDICAL GROUP | Facility: MEDICAL CENTER | Age: 82
End: 2022-08-30
Payer: MEDICARE

## 2022-08-30 VITALS
WEIGHT: 198 LBS | DIASTOLIC BLOOD PRESSURE: 64 MMHG | HEART RATE: 74 BPM | SYSTOLIC BLOOD PRESSURE: 112 MMHG | TEMPERATURE: 97.2 F | HEIGHT: 69 IN | BODY MASS INDEX: 29.33 KG/M2 | OXYGEN SATURATION: 97 %

## 2022-08-30 DIAGNOSIS — Z02.89 ENCOUNTER FOR COMPLETION OF FORM WITH PATIENT: ICD-10-CM

## 2022-08-30 PROCEDURE — 99214 OFFICE O/P EST MOD 30 MIN: CPT | Performed by: INTERNAL MEDICINE

## 2022-08-30 RX ORDER — ISOSORBIDE MONONITRATE 30 MG/1
TABLET, EXTENDED RELEASE ORAL EVERY MORNING
COMMUNITY
Start: 2022-08-19

## 2022-08-30 ASSESSMENT — ENCOUNTER SYMPTOMS
NAUSEA: 0
CHILLS: 0
FEVER: 0
BACK PAIN: 1
VOMITING: 0
PALPITATIONS: 1
COUGH: 1

## 2022-08-30 ASSESSMENT — FIBROSIS 4 INDEX: FIB4 SCORE: 1.72

## 2022-08-31 NOTE — PROGRESS NOTES
Subjective:     Chief Complaint   Patient presents with    Paperwork     The encounter diagnosis was Encounter for completion of form with patient.    HPI: Amarjit is a pleasant 82 y.o. male who presents today with his wife Makeda for completion of assisted living forms.    Problem   Encounter for Completion of Form With Patient    This is a pleasant 82-year-old male presenting today with his wife for completion of assisted living forms.  He and his wife are planning on moving to [address] [place] [address].  Due to the age, multiple comorbidities and overall deconditioning, he needs assistance with bathing, dressing, cleaning.  He would benefit from residing at assisted care facility, his chronic conditions are controlled.  Please refer to media section for detailed form.        Past Medical History:   Diagnosis Date    Acute MI (HCC)     cardiologist, Dr. Hernández    Allergy     Anesthesia     Vomiting    Arrhythmia     A-fib    Arthritis     Spine, hips, knees    Bowel habit changes     Constipation    Cancer (HCC)     Skin     Chickenpox     Coronary artery disease     CY deficiency     CY gene mutation     Diabetes (HCC)     Diet and oral medication    Emphysema of lung (HCC)     High cholesterol     Hyperlipidemia     Hypertension     Muscle disorder     Pain     Back and hips    Personal history of venous thrombosis and embolism     left arm    Rectal abscess     Rheumatic fever as child    Sleep apnea     CPAP    Snoring     Unspecified cataract     surgical correction bilateral     Current Outpatient Medications Ordered in Epic   Medication Sig Dispense Refill    isosorbide mononitrate SR (IMDUR) 30 MG TABLET SR 24 HR       benzonatate (TESSALON) 100 MG Cap Take 1 Capsule by mouth 3 times a day as needed for Cough. (Patient not taking: Reported on 2022) 30 Capsule 0    atorvastatin (LIPITOR) 80 MG tablet Take 0.5 Tablets by mouth every day. 50 Tablet 1    DULoxetine (CYMBALTA) 60 MG Cap  "DR Particles delayed-release capsule Take 1 Capsule by mouth every day. 90 Capsule 1    Empagliflozin (JARDIANCE) 10 MG Tab Take 1 Tablet by mouth every day. 100 Tablet 4    metoprolol SR (TOPROL XL) 25 MG TABLET SR 24 HR Take 0.5 Tablets by mouth 2 times a day. 100 Tablet 1    XARELTO 20 MG Tab tablet Take 1 Tablet by mouth with dinner. 90 Tablet 1    lisinopril (PRINIVIL) 20 MG Tab Take 0.5 Tablets by mouth every day. 30 Tablet 0    acetaminophen (TYLENOL) 500 MG Tab Take 1,000 mg by mouth 1 time a day as needed. Indications: Pain (Patient not taking: Reported on 8/23/2022)      aspirin (ASA) 81 MG Chew Tab chewable tablet Chew 81 mg every evening. 100 Tab 0    Cholecalciferol (VITAMIN D) 2000 UNIT CAPS Take 2,000 Units by mouth every day.       No current [facility]-ordered facility-administered medications on file.     Review of Systems   Constitutional:  Negative for chills and fever.   Respiratory:  Positive for cough (post COVID-19).    Cardiovascular:  Positive for chest pain (crhonic midsternal over a year) and palpitations (ocasional).   Gastrointestinal:  Negative for nausea and vomiting.   Musculoskeletal:  Positive for back pain.     Objective:     Exam:  /64 (BP Location: Left arm, Patient Position: Sitting, BP Cuff Size: Adult)   Pulse 74   Temp 36.2 °C (97.2 °F) (Temporal)   Ht 1.753 m (5' 9\")   Wt 89.8 kg (198 lb)   SpO2 97%   BMI 29.24 kg/m²  Body mass index is 29.24 kg/m².    Physical Exam  Constitutional:       Appearance: Normal appearance.   HENT:      Head: Normocephalic and atraumatic.      Nose: Nose normal.      Mouth/Throat:      Mouth: Mucous membranes are moist.      Pharynx: Oropharynx is clear. No oropharyngeal exudate or posterior oropharyngeal erythema.   Eyes:      General: No scleral icterus.     Extraocular Movements: Extraocular movements intact.      Conjunctiva/sclera: Conjunctivae normal.      Pupils: Pupils are equal, round, and reactive to light.   Cardiovascular:      " Rate and Rhythm: Normal rate and regular rhythm.      Pulses: Normal pulses.      Heart sounds: Normal heart sounds. No murmur heard.  Pulmonary:      Effort: Pulmonary effort is normal.      Breath sounds: Normal breath sounds.   Abdominal:      General: There is no distension.      Palpations: Abdomen is soft. There is no mass.      Tenderness: There is no right CVA tenderness, left CVA tenderness, guarding or rebound.   Neurological:      Mental Status: He is alert and oriented to person, place, and time.   Psychiatric:         Mood and Affect: Mood normal.     Assessment & Plan:   Amarjit  is a pleasant 82 y.o. male with the following -     Problem List Items Addressed This Visit       Encounter for completion of form with patient     Please refer to media section for complete form.          My total time spent caring for the patient on the day of the encounter  (including 7 page assisted living form completion) was 39 minutes.   This does not include time spent on separately billable procedures/tests.    Return if symptoms worsen or fail to improve.    Please note that this dictation was created using voice recognition software. I have made every reasonable attempt to correct obvious errors, but I expect that there are errors of grammar and possibly content that I did not discover before finalizing the note.

## 2022-09-23 ENCOUNTER — OFFICE VISIT (OUTPATIENT)
Dept: MEDICAL GROUP | Facility: PHYSICIAN GROUP | Age: 82
End: 2022-09-23
Payer: MEDICARE

## 2022-09-23 VITALS
HEART RATE: 88 BPM | SYSTOLIC BLOOD PRESSURE: 118 MMHG | HEIGHT: 69 IN | BODY MASS INDEX: 30.01 KG/M2 | OXYGEN SATURATION: 98 % | DIASTOLIC BLOOD PRESSURE: 68 MMHG | TEMPERATURE: 97.9 F | RESPIRATION RATE: 18 BRPM | WEIGHT: 202.6 LBS

## 2022-09-23 DIAGNOSIS — Z76.89 ENCOUNTER TO ESTABLISH CARE: ICD-10-CM

## 2022-09-23 DIAGNOSIS — I25.10 CORONARY ARTERY DISEASE DUE TO LIPID RICH PLAQUE: ICD-10-CM

## 2022-09-23 DIAGNOSIS — E11.9 TYPE 2 DIABETES MELLITUS WITHOUT COMPLICATION, WITHOUT LONG-TERM CURRENT USE OF INSULIN (HCC): ICD-10-CM

## 2022-09-23 DIAGNOSIS — Z23 NEED FOR VACCINATION: ICD-10-CM

## 2022-09-23 DIAGNOSIS — Z86.010 HISTORY OF COLON POLYPS: ICD-10-CM

## 2022-09-23 DIAGNOSIS — G47.33 OBSTRUCTIVE SLEEP APNEA SYNDROME: ICD-10-CM

## 2022-09-23 DIAGNOSIS — I25.83 CORONARY ARTERY DISEASE DUE TO LIPID RICH PLAQUE: ICD-10-CM

## 2022-09-23 PROBLEM — Z02.89 ENCOUNTER FOR COMPLETION OF FORM WITH PATIENT: Status: RESOLVED | Noted: 2022-08-30 | Resolved: 2022-09-23

## 2022-09-23 PROBLEM — Z00.00 HEALTHCARE MAINTENANCE: Status: RESOLVED | Noted: 2021-05-20 | Resolved: 2022-09-23

## 2022-09-23 LAB
HBA1C MFR BLD: 6.5 % (ref 0–5.6)
INT CON NEG: NEGATIVE
INT CON POS: POSITIVE

## 2022-09-23 PROCEDURE — G0008 ADMIN INFLUENZA VIRUS VAC: HCPCS | Performed by: FAMILY MEDICINE

## 2022-09-23 PROCEDURE — 83036 HEMOGLOBIN GLYCOSYLATED A1C: CPT | Performed by: FAMILY MEDICINE

## 2022-09-23 PROCEDURE — 99214 OFFICE O/P EST MOD 30 MIN: CPT | Mod: 25 | Performed by: FAMILY MEDICINE

## 2022-09-23 PROCEDURE — 90662 IIV NO PRSV INCREASED AG IM: CPT | Performed by: FAMILY MEDICINE

## 2022-09-23 RX ORDER — TAMSULOSIN HYDROCHLORIDE 0.4 MG/1
0.4 CAPSULE ORAL
COMMUNITY
Start: 2022-09-02

## 2022-09-23 ASSESSMENT — FIBROSIS 4 INDEX: FIB4 SCORE: 1.72

## 2022-09-23 NOTE — PROGRESS NOTES
Subjective:     CC: Here to establish care.    HPI:   Amarjit presents today with the following medical concerns:    Encounter to establish care  Patient is here today to establish care.  He has a lot of stress in his life as his wife is having major health issues.  He also has problems with low back pain and neuropathy.  But everything seems to be fairly stable at the present time.  His exam is current.  He is due for hemoglobin A1c.    CAD (coronary artery disease)  This is a chronic issue.  He is followed by cardiology.  He is not having any symptoms at the present time.    Obstructive sleep apnea syndrome  This is a chronic problem.  He was very unhappy with the nurse practitioner he saw last year and he like to see a physician for his sleep apnea.  A new referral will be made.  He states he is feeling fatigued when he wakes up in the morning and thinks something might be wrong with his machine or mask.    Type 2 diabetes mellitus without complication, without long-term current use of insulin (HCC)  This is a chronic problem.  Patient has lost weight since being on Jardiance.  He states it is an expensive medicine however.  He is due to have his hemoglobin A1c done today.    Past Medical History:   Diagnosis Date    Acute MI (HCC)     cardiologist, Dr. Hernández    Allergy     Anesthesia     Vomiting    Arrhythmia     A-fib    Arthritis     Spine, hips, knees    Bowel habit changes     Constipation    Cancer (HCC)     Skin     Chickenpox     Coronary artery disease     CY deficiency     CY gene mutation     Diabetes (HCC)     Diet and oral medication    Emphysema of lung (HCC)     High cholesterol     Hyperlipidemia     Hypertension     Muscle disorder     Pain     Back and hips    Personal history of venous thrombosis and embolism 2004    left arm    Rectal abscess     Rheumatic fever as child    Sleep apnea     CPAP    Snoring     Unspecified cataract     surgical correction bilateral       Social  History     Tobacco Use    Smoking status: Never    Smokeless tobacco: Never   Vaping Use    Vaping Use: Never used   Substance Use Topics    Alcohol use: Yes     Alcohol/week: 1.2 oz     Types: 2 Glasses of wine per week     Comment: Occasional    Drug use: Not Currently     Types: Marijuana, Oral     Comment: Occasionally for back pain       Current Outpatient Medications Ordered in Epic   Medication Sig Dispense Refill    isosorbide mononitrate SR (IMDUR) 30 MG TABLET SR 24 HR       atorvastatin (LIPITOR) 80 MG tablet Take 0.5 Tablets by mouth every day. 50 Tablet 1    DULoxetine (CYMBALTA) 60 MG Cap DR Particles delayed-release capsule Take 1 Capsule by mouth every day. 90 Capsule 1    metoprolol SR (TOPROL XL) 25 MG TABLET SR 24 HR Take 0.5 Tablets by mouth 2 times a day. 100 Tablet 1    XARELTO 20 MG Tab tablet Take 1 Tablet by mouth with dinner. 90 Tablet 1    lisinopril (PRINIVIL) 20 MG Tab Take 0.5 Tablets by mouth every day. 30 Tablet 0    aspirin (ASA) 81 MG Chew Tab chewable tablet Chew 81 mg every evening. 100 Tab 0    Cholecalciferol (VITAMIN D) 2000 UNIT CAPS Take 2,000 Units by mouth every day.       No current [facility]-ordered facility-administered medications on file.       Allergies:  Butrans [buprenorphine], Codeine, Darvocet [propoxyphene n-apap], Fentanyl, Nucynta [tapentadol], Percocet [oxycodone-acetaminophen], Biaxin [clarithromycin], Buspar [buspirone], Celexa, Citalopram, Clindamycin, Clonazepam, Demerol, Diltiazem, Doxepin, Effexor [venlafaxine], Erythromycin, Haldol decanoate, Haldol [haloperidol], Hydrocodone, Lexapro, Lunesta, Lyrica, Morphine sulfate [bupropion], Remeron soltab, Remeron [mirtazapine], Serzone [nefazodone], Tape, Tizanidine hcl, Valium, Xanax [alprazolam], Zolpidem, Zonisamide, and Zyprexa    Health Maintenance: Completed    ROS:  Gen: no fevers/chills, no changes in weight  Eyes: no changes in vision  ENT: no sore throat, no hearing loss, no bloody nose  Pulm: no sob, no  "cough  CV: no chest pain, no palpitations  GI: no nausea/vomiting, no diarrhea  : no dysuria  Skin: no rash  Neuro: no headaches,   Heme/Lymph: no easy bruising      Objective:       Exam:  /68 (BP Location: Right arm, Patient Position: Sitting, BP Cuff Size: Adult)   Pulse 88   Temp 36.6 °C (97.9 °F) (Temporal)   Resp 18   Ht 1.753 m (5' 9\")   Wt 91.9 kg (202 lb 9.6 oz)   SpO2 98%   BMI 29.92 kg/m²  Body mass index is 29.92 kg/m².    Gen: Alert and oriented, No apparent distress.  Neck: Neck is supple without lymphadenopathy.  Lungs: Normal effort, CTA bilaterally, no wheezes, rhonchi, or rales  CV: Regular rate and rhythm. No murmurs, rubs, or gallops.  Ext: No clubbing, cyanosis, edema.      Labs: Point-of-care hemoglobin A1c is 6.5%.    Assessment & Plan:     82 y.o. male with the following -     1. Need for vaccination  Flu shot given today.  - Influenza Vaccine, High Dose (65+ Only)    2. Type 2 diabetes mellitus without complication, without long-term current use of insulin (HCC)  This is a chronic problem.  Patient's hemoglobin A1c is very good.  We will have him stop his Jardiance and then we will recheck his hemoglobin A1c in 3 months with annual lab work.  - POCT  A1C  - Referral to Pharmacotherapy Service    3. History of colon polyps  This is a chronic problem.  Referral to gastroenterology for colonoscopy made.  - Referral to Gastroenterology    4. Coronary artery disease due to lipid rich plaque  This is a chronic problem.  Continue care through his cardiologist.  He states the Xarelto is very expensive so we will refer him to the pharmacy to see if they can find alternatives to help with cost.  - Referral to Pharmacotherapy Service    5. Obstructive sleep apnea syndrome  This is a chronic problem.  Referral to sleep medicine made.  He wants to see a physician.  - Referral to Pulmonary and Sleep Medicine    6. Encounter to establish care  Patient establish care with me today.  General " health care issues addressed.  His health history medications were reviewed.      Return in about 6 months (around 3/23/2023) for Long.    Please note that this dictation was created using voice recognition software. I have made every reasonable attempt to correct obvious errors, but I expect that there are errors of grammar and possibly content that I did not discover before finalizing the note.

## 2022-09-23 NOTE — ASSESSMENT & PLAN NOTE
This is a chronic problem.  Patient has lost weight since being on Jardiance.  He states it is an expensive medicine however.  He is due to have his hemoglobin A1c done today.

## 2022-09-23 NOTE — ASSESSMENT & PLAN NOTE
This is a chronic issue.  He is followed by cardiology.  He is not having any symptoms at the present time.

## 2022-09-23 NOTE — ASSESSMENT & PLAN NOTE
Patient is here today to establish care.  He has a lot of stress in his life as his wife is having major health issues.  He also has problems with low back pain and neuropathy.  But everything seems to be fairly stable at the present time.  His exam is current.  He is due for hemoglobin A1c.

## 2022-09-23 NOTE — ASSESSMENT & PLAN NOTE
This is a chronic problem.  He was very unhappy with the nurse practitioner he saw last year and he like to see a physician for his sleep apnea.  A new referral will be made.  He states he is feeling fatigued when he wakes up in the morning and thinks something might be wrong with his machine or mask.

## 2022-09-26 ENCOUNTER — TELEPHONE (OUTPATIENT)
Dept: VASCULAR LAB | Facility: MEDICAL CENTER | Age: 82
End: 2022-09-26
Payer: MEDICARE

## 2022-09-26 NOTE — TELEPHONE ENCOUNTER
[facility] and Pharmacotherapy [facility]    Received pharmacotherapy referral for DM/lipids due to med cost from Dr. Suarez on 9/23/22    Scheduled pt for initial visit on 10/3    Insurance: [facility]  PCP: [facility]  Locations to be seen: Any    [facility] Anticoagulation/Pharmacotherapy Clinic at [phone], fax [phone]    Cam Little, ShantalD, BCACP

## 2022-10-03 ENCOUNTER — NON-PROVIDER VISIT (OUTPATIENT)
Dept: MEDICAL GROUP | Facility: PHYSICIAN GROUP | Age: 82
End: 2022-10-03
Payer: MEDICARE

## 2022-10-03 NOTE — PROGRESS NOTES
Pt comes into clinic to discuss cost of Jardiance and Xarelto.    PCP Dr Suarez recently stopped Jardiance.    Xarelto is currently $148/month. Pt does not meet income requirements for PAP w/ Eliquis or Xarelto. Discussed the alternative is warfarin/Coumadin. Pt prefers to stick w/ Xarelto at this time.    Counseled pt on how to use his glucometer    F/u PRN    Nina London, ShantalD

## 2022-10-04 ENCOUNTER — PATIENT OUTREACH (OUTPATIENT)
Dept: HEALTH INFORMATION MANAGEMENT | Facility: OTHER | Age: 82
End: 2022-10-04
Payer: MEDICARE

## 2022-10-04 DIAGNOSIS — I25.10 CORONARY ARTERY DISEASE DUE TO LIPID RICH PLAQUE: ICD-10-CM

## 2022-10-04 DIAGNOSIS — I25.83 CORONARY ARTERY DISEASE DUE TO LIPID RICH PLAQUE: ICD-10-CM

## 2022-10-04 DIAGNOSIS — E11.9 TYPE 2 DIABETES MELLITUS WITHOUT COMPLICATION, WITHOUT LONG-TERM CURRENT USE OF INSULIN (HCC): ICD-10-CM

## 2022-10-04 NOTE — PROGRESS NOTES
Amarjit is an 82 year old male in office today to see Pharmacy for Dm management. I spoke to him following his appointment and introduced Personal care Management program and CCM services to him and his wife Makeda. They both said at this time they are not interested as they are getting ready to be moved into an assisted living home and have nurses coming to the house to do their assessments, and they are busy trying to get prepared for that. Informed them that with CCM services we can help with the adjustment and appointments and medical questions during this time. They declined at this time and will call if they have further questions or interest.

## 2022-10-21 ENCOUNTER — OFFICE VISIT (OUTPATIENT)
Dept: SLEEP MEDICINE | Facility: MEDICAL CENTER | Age: 82
End: 2022-10-21
Payer: MEDICARE

## 2022-10-21 VITALS
HEART RATE: 88 BPM | WEIGHT: 211 LBS | DIASTOLIC BLOOD PRESSURE: 58 MMHG | OXYGEN SATURATION: 94 % | RESPIRATION RATE: 16 BRPM | HEIGHT: 68 IN | BODY MASS INDEX: 31.98 KG/M2 | SYSTOLIC BLOOD PRESSURE: 96 MMHG

## 2022-10-21 DIAGNOSIS — I48.91 ATRIAL FIBRILLATION, UNSPECIFIED TYPE (HCC): ICD-10-CM

## 2022-10-21 DIAGNOSIS — I48.0 PAROXYSMAL ATRIAL FIBRILLATION (HCC): ICD-10-CM

## 2022-10-21 DIAGNOSIS — I10 PRIMARY HYPERTENSION: ICD-10-CM

## 2022-10-21 DIAGNOSIS — Z79.01 ANTICOAGULATED: ICD-10-CM

## 2022-10-21 DIAGNOSIS — G47.33 OSA TREATED WITH BIPAP: ICD-10-CM

## 2022-10-21 PROCEDURE — 99214 OFFICE O/P EST MOD 30 MIN: CPT | Performed by: PREVENTIVE MEDICINE

## 2022-10-21 ASSESSMENT — FIBROSIS 4 INDEX: FIB4 SCORE: 1.72

## 2022-10-21 NOTE — PROGRESS NOTES
"CHIEF COMPLIANT: \"It should work better for me. I am not feeling like I'm getting full benefit.\"   LAST SEEN:  Valentine Rodriguez   HISTORY OF PRESENT ILLNESS:  Amarjit Khan is a 82 y.o.male. He is here to follow-up.  He has a past medical history includes lipidemia, CAD, hypertension, IVAN, chronic low back pain, hypersomnia, depression, A. 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    Today compliance is as follows:  COMPLIANCE DATA: 67% greater than 4 hours   Machine type: AirCurve 10 ST   Date range: 22-10/21/22  AHI: 5.5  TIME USED: 4 hrs and 33mins   PRESSURE SETTINGS: ST mode with IPAP 20 and EPAP 16 and back up rate 16bpm   LEAK: 80 L/min  OTHER: DME preferred   This patient has been using the same type of PAP therapy since 2019 when he had a titration study done at Dr. Pulido's office.     Sleep/Card Study History:   Pt was diagnosed with IVAN about 30+ years and has been on CPAP since the. His last SS was 2 years ago, in 2019. with Dr. Pulido's office.       Echo from 10/20/20 indicated left ventricular ejection fraction is visually estimated to be 70%. Normal left ventricular systolic function. Mild concentric left ventricular hypertrophy. The left atrium is normal in size. The aortic valve is not well visualized. No stenosis or regurgitation seen. Structurally normal mitral valve without significant stenosis or regurgitation. Structurally normal tricuspid valve without significant stenosis or regurgitation. Normal pericardium without effusion.    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    Back pain 2021    Diabetic neuropathy ([facility]) 2021    Debility 2021    Memory loss 2021    Hip pain 2020    Paroxysmal atrial fibrillation ([facility]) 2020    Depression 2020    Type 2 diabetes mellitus without complication, without long-term current use of insulin ([facility]) 2019    Fatigue 2018    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 Hernández    Allergy     Anesthesia     Vomiting    Arrhythmia     A-fib    Arthritis     Spine, hips, knees    Bowel habit changes     Constipation    Cancer ([facility])     Skin     Chickenpox     Coronary artery disease     CY deficiency     CY gene mutation     Diabetes ([facility])     Diet and oral medication    Emphysema of lung ([facility])     High cholesterol     Hyperlipidemia     Hypertension     Muscle disorder     Pain     Back and hips    Personal history of venous thrombosis and embolism     left arm    Rectal abscess     Rheumatic fever as child    Sleep apnea     CPAP    Snoring     Unspecified cataract     surgical correction bilateral      PAST SOCIAL HISTORY:  Past Surgical History:   Procedure Laterality Date    PB IMPLANT NEUROSTIM/ N/A 2021    Procedure: REMOVAL NEUROSTIMULATOR, PERMANENT, SPINAL CORD;  Surgeon: Margarito Goode M.D.;  Location: SURGERY [facility];  Service: Pain Management    RI TEMPORAL ARTERY LIGATN OR BX Right 2020    Procedure: BIOPSY, ARTERY, TEMPORAL;  Surgeon: Perry Vale M.D.;  Location: SURGERY [facility];  Service: Vascular    SPINAL CORD STIMULATOR  2015    Procedure: SPINAL CORD  STIMULATOR PERC PERM;  Surgeon: Margarito Goode M.D.;  Location: SURGERY [facility];  Service:     ND PERCUT IMPLNT NEUROELECT,EPIDURAL  7/15/2015    Procedure: IMPLANT NEUROSTIM EPI ARRAY - SPINAL CORD STIM TRIAL [employer];  Surgeon: Margarito Goode M.D.;  Location: SURGERY [facility];  Service: Pain Management    ND PERCUT IMPLNT NEUROELECT,EPIDURAL  7/15/2015    Procedure: IMPLANT NEUROSTIM EPI ARRAY;  Surgeon: Margarito Goode M.D.;  Location: SURGERY [facility];  Service: Pain Management    HIP ARTHROPLASTY TOTAL Left 2/2015    CATARACT EXTRACTION WITH IOL Bilateral 2010    ANAL FISTULOTOMY  8/27/08    Performed by ELLY RAMOS at SURGERY SAME DAY [facility]    SHOULDER ARTHROTOMY Right 2006    NERVE ULNAR TRANSFER Right 2004    ELBOW ARTHROTOMY Left 1998    KNEE ARTHROSCOPY Left 1995    KNEE ARTHROTOMY Right 1992    KNEE ARTHROTOMY Right 1990    LUMBAR LAMINECTOMY DISKECTOMY  1988    EYE SURGERY      STENT PLACEMENT  2005,2010     PAST FAMILY HISTORY:  Family History   Problem Relation Age of Onset    Diabetes Mother     Heart Disease Mother     Hyperlipidemia Mother     Diabetes Sister     Heart Disease Sister     Sleep Apnea Son      SOCIAL HISTORY:  Social History     Socioeconomic History    Marital status:      Spouse name: Not on file    Number of children: Not on file    Years of education: Not on file    Highest education level: Not on file   Occupational History    Not on file   Tobacco Use    Smoking status: Never    Smokeless tobacco: Never   Vaping Use    Vaping Use: Never used   Substance and Sexual Activity    Alcohol use: Yes     Alcohol/week: 1.2 oz     Types: 2 Glasses of wine per week     Comment: Occasional    Drug use: Not Currently     Types: Marijuana, Oral     Comment: Occasionally for back pain    Sexual activity: Not Currently     Partners: Female   Other Topics Concern    Not on file   Social History Narrative    Not on file     Social  "Determinants of Health     Financial Resource Strain: Not on file   Food Insecurity: Not on file   Transportation Needs: Not on file   Physical Activity: Not on file   Stress: Not on file   Social Connections: Not on file   Intimate Partner Violence: Not on file   Housing Stability: Not on file     ALLERGIES: Butrans [buprenorphine], Codeine, Darvocet [propoxyphene n-apap], Fentanyl, Nucynta [tapentadol], Percocet [oxycodone-acetaminophen], Biaxin [clarithromycin], Buspar [buspirone], Celexa, Citalopram, Clindamycin, Clonazepam, Demerol, Diltiazem, Doxepin, Effexor [venlafaxine], Erythromycin, Haldol decanoate, Haldol [haloperidol], Hydrocodone, Lexapro, Lunesta, Lyrica, Morphine sulfate [bupropion], Remeron soltab, Remeron [mirtazapine], Serzone [nefazodone], Tape, Tizanidine hcl, Valium, Xanax [alprazolam], Zolpidem, Zonisamide, and Zyprexa  MEDICATIONS:  Current Outpatient Medications   Medication Sig Dispense Refill    tamsulosin (FLOMAX) 0.4 MG capsule       isosorbide mononitrate SR (IMDUR) 30 MG TABLET SR 24 HR       atorvastatin (LIPITOR) 80 MG tablet Take 0.5 Tablets by mouth every day. 50 Tablet 1    DULoxetine (CYMBALTA) 60 MG Cap DR Particles delayed-release capsule Take 1 Capsule by mouth every day. 90 Capsule 1    metoprolol SR (TOPROL XL) 25 MG TABLET SR 24 HR Take 0.5 Tablets by mouth 2 times a day. 100 Tablet 1    XARELTO 20 MG Tab tablet Take 1 Tablet by mouth with dinner. 90 Tablet 1    lisinopril (PRINIVIL) 20 MG Tab Take 0.5 Tablets by mouth every day. 30 Tablet 0    aspirin (ASA) 81 MG Chew Tab chewable tablet Chew 81 mg every evening. 100 Tab 0    Cholecalciferol (VITAMIN D) 2000 UNIT CAPS Take 2,000 Units by mouth every day.       No current facility-administered medications for this visit.    \"CURRENT RX\"    REVIEW OF SYSTEMS:  Constitutional: Denies weight loss, endorses chronic daytime fatigue  Eyes: Denies vision changes  Ears/Nose/Mouth/Throat: Denies rhinitis/nasal congestion, injury, " "decayed teeth/toothaches.  Cardiovascular: Denies chest pain, tightness, palpitations, swelling in legs/feet, difficulty breathing when lying down but gets better when sitting up.   Respiratory: Denies shortness of breath while awake,  Sleep: per HPI  Gastrointestinal: Denies  difficulty swallowing,  heartburn.  Genitourinary: REPORTS nocturia  Musculoskeletal: Denies painful joints, sore muscles, back pain.   Neurological: Denies frequent headaches,weakness, dizziness.    PHYSICAL EXAM/VITALS:  BP (!) 96/58 (BP Location: Left arm, Patient Position: Sitting, BP Cuff Size: Adult)   Pulse 88   Resp 16   Ht 1.727 m (5' 8\")   Wt 95.7 kg (211 lb)   SpO2 94%   BMI 32.08 kg/m²   Appearance: Well-nourished, well-developed,  looks stated age, no acute distress  Eyes:   EOMI  ENMT: MASKED  Neck: Supple, trachea midline  Respiratory effort:  No intercostal retractions or use of accessory muscles  Musculoskeletal:  Grossly normal; gait and station normal  Neurologic:  oriented to person, time, place, and purpose; judgement intact  Psychiatric:  No depression, anxiety, agitation    MEDICAL DECISION MAKING:  The medical record was reviewed in its as pertains to this referral. This includes records from primary care, consultants notes,  referral request, hospital records, labs, imaging. Any available diagnostic and titration nocturnal polysomnograms, home sleep apnea tests, continuous nocturnal oximetry results, multiple sleep latency tests, and compliance reports were reviewed with the patient.    ASSESSMENT/PLAN:    He is a 82 year old male who continues to feel chronic fatigue and does not feel fully rested in the am. It will be important to obtain raw data from Dr. Pulido's office from 2019 titration study. Otherwise, we will need to repeat titration study who should be doing well but is not. I suspect he may not been put on the proper modality or proper pressures. He is clearly experiencing inadequate sleep syndrome as " his PAP usage is well under 6 hrs per day. He also experiencing significant mask leak. This patient will be asked to sign an JEFF for Dr. Pulido office.   This patient may need to be re-titrated as there is a possibilty of a mode or pressure mismatch    1. IVAN treated with BiPAP ST    - DME Mask Fitting; Future    2. Atrial fibrillation, unspecified type (HCC)    - DME Mask Fitting; Future    3. Primary hypertension    - DME Mask Fitting; Future    4. Paroxysmal atrial fibrillation (HCC)    - DME Mask Fitting; Future    5. Anticoagulated    - DME Mask Fitting; Future      Has been advised to continue the current Pap Therapy.  Also advised to clean equipment frequently, and get new mask and supplies as allowed by insurance and DME.  Patient was advised to NEVER use  OZONE containing cleaning systems such as So Clean.  The risks of untreated sleep apnea were discussed with the patient at length. Patients with IVAN are at increased risk of cardiovascular disease including coronary artery disease, systemic arterial hypertension, pulmonary arterial hypertension, cardiac arrhythmias, and stroke. IVAN patients have an increased risk of motor vehicle accidents, type 2 diabetes, chronic kidney disease, and non-alcoholic liver disease. The patient was advised to avoid driving a motor vehicle when drowsy.  Have advised the patient to follow up with the appropriate healthcare practitioners for all other medical problems and issues.    RETURN TO CLINIC: Return in about 3 months (around 1/21/2023) for With Dr Umana, Compliance check.  My total time spent caring for the patient on the day of the encounter was 40minutes. This includes time time spent on a thorough chart review including other physician notes, any type of sleep study, as well as critical labs and pulmonary and cardiac studies.  Additionally it includes discussions of good sleep hygiene, stimulus control and going over the need for consistency in terms of sleep  preparation and practice.     Please note that this dictation was created using voice recognition software.  I have made every reasonable attempt to correct obvious errors, I expect that there are errors of grammar and possibly content that I did not discover before finalizing this note.

## 2022-10-31 ENCOUNTER — DOCUMENTATION (OUTPATIENT)
Dept: HEALTH INFORMATION MANAGEMENT | Facility: OTHER | Age: 82
End: 2022-10-31
Payer: MEDICARE

## 2022-11-22 ENCOUNTER — TELEPHONE (OUTPATIENT)
Dept: MEDICAL GROUP | Facility: PHYSICIAN GROUP | Age: 82
End: 2022-11-22
Payer: MEDICARE

## 2022-11-23 NOTE — TELEPHONE ENCOUNTER
Patient called complaining of vertigo and dizziness stating he had a fall yesterday. Was wondering if there is anything to help him with his symptoms

## 2022-11-29 ENCOUNTER — APPOINTMENT (RX ONLY)
Dept: URBAN - METROPOLITAN AREA CLINIC 4 | Facility: CLINIC | Age: 82
Setting detail: DERMATOLOGY
End: 2022-11-29

## 2022-11-29 DIAGNOSIS — Z71.89 OTHER SPECIFIED COUNSELING: ICD-10-CM

## 2022-11-29 DIAGNOSIS — L81.4 OTHER MELANIN HYPERPIGMENTATION: ICD-10-CM

## 2022-11-29 DIAGNOSIS — Z85.828 PERSONAL HISTORY OF OTHER MALIGNANT NEOPLASM OF SKIN: ICD-10-CM

## 2022-11-29 DIAGNOSIS — L82.0 INFLAMED SEBORRHEIC KERATOSIS: ICD-10-CM

## 2022-11-29 DIAGNOSIS — D18.0 HEMANGIOMA: ICD-10-CM

## 2022-11-29 DIAGNOSIS — L82.1 OTHER SEBORRHEIC KERATOSIS: ICD-10-CM

## 2022-11-29 DIAGNOSIS — D22 MELANOCYTIC NEVI: ICD-10-CM

## 2022-11-29 PROBLEM — D22.9 MELANOCYTIC NEVI, UNSPECIFIED: Status: ACTIVE | Noted: 2022-11-29

## 2022-11-29 PROBLEM — D18.01 HEMANGIOMA OF SKIN AND SUBCUTANEOUS TISSUE: Status: ACTIVE | Noted: 2022-11-29

## 2022-11-29 PROCEDURE — ? COUNSELING

## 2022-11-29 PROCEDURE — 99213 OFFICE O/P EST LOW 20 MIN: CPT | Mod: 25

## 2022-11-29 PROCEDURE — 17110 DESTRUCTION B9 LES UP TO 14: CPT

## 2022-11-29 PROCEDURE — ? LIQUID NITROGEN

## 2022-11-29 ASSESSMENT — LOCATION DETAILED DESCRIPTION DERM
LOCATION DETAILED: LEFT MID TEMPLE
LOCATION DETAILED: RIGHT POSTERIOR SHOULDER
LOCATION DETAILED: RIGHT SUPERIOR LATERAL MALAR CHEEK

## 2022-11-29 ASSESSMENT — LOCATION SIMPLE DESCRIPTION DERM
LOCATION SIMPLE: RIGHT CHEEK
LOCATION SIMPLE: RIGHT SHOULDER
LOCATION SIMPLE: LEFT TEMPLE

## 2022-11-29 ASSESSMENT — LOCATION ZONE DERM
LOCATION ZONE: ARM
LOCATION ZONE: FACE

## 2022-11-29 NOTE — PROCEDURE: LIQUID NITROGEN
Consent: The patient's consent was obtained including but not limited to risks of crusting, scabbing, blistering, scarring, darker or lighter pigmentary change, recurrence, incomplete removal and infection.
Add 52 Modifier (Optional): no
Medical Necessity Information: It is in your best interest to select a reason for this procedure from the list below. All of these items fulfill various CMS LCD requirements except the new and changing color options.
Show Topical Anesthesia Variable?: Yes
Aperture Size (Optional): C
Detail Level: Detailed
Number Of Freeze-Thaw Cycles: 1 freeze-thaw cycle
Duration Of Freeze Thaw-Cycle (Seconds): 3
Post-Care Instructions: I reviewed with the patient in detail post-care instructions. Patient is to wear sunprotection, and avoid picking at any of the treated lesions. Pt may apply Vaseline to crusted or scabbing areas.
Medical Necessity Clause: This procedure was medically necessary because the lesions that were treated were:
Spray Paint Text: The liquid nitrogen was applied to the skin utilizing a spray paint frosting technique.

## 2022-12-02 DIAGNOSIS — G89.29 OTHER CHRONIC PAIN: ICD-10-CM

## 2022-12-02 RX ORDER — DULOXETIN HYDROCHLORIDE 60 MG/1
CAPSULE, DELAYED RELEASE ORAL
Qty: 100 CAPSULE | Refills: 1 | Status: SHIPPED | OUTPATIENT
Start: 2022-12-02

## 2022-12-02 RX ORDER — ATORVASTATIN CALCIUM 80 MG/1
TABLET, FILM COATED ORAL
Qty: 50 TABLET | Refills: 1 | Status: SHIPPED | OUTPATIENT
Start: 2022-12-02

## 2022-12-02 NOTE — TELEPHONE ENCOUNTER
Received request via: Pharmacy    Was the patient seen in the last year in this department? Yes    Does the patient have an active prescription (recently filled or refills available) for medication(s) requested? No    Does the patient have [facility] and need 100 day supply (blood pressure, diabetes and cholesterol meds only)? Yes, quantity updated to 100 days

## 2023-01-31 DIAGNOSIS — I10 ESSENTIAL HYPERTENSION: ICD-10-CM

## 2023-01-31 RX ORDER — METOPROLOL SUCCINATE 25 MG/1
TABLET, EXTENDED RELEASE ORAL
Qty: 90 TABLET | Refills: 2 | Status: SHIPPED | OUTPATIENT
Start: 2023-01-31

## 2023-11-29 ENCOUNTER — APPOINTMENT (RX ONLY)
Dept: URBAN - METROPOLITAN AREA CLINIC 4 | Facility: CLINIC | Age: 83
Setting detail: DERMATOLOGY
End: 2023-11-29

## 2023-11-29 DIAGNOSIS — L81.4 OTHER MELANIN HYPERPIGMENTATION: ICD-10-CM

## 2023-11-29 DIAGNOSIS — L82.0 INFLAMED SEBORRHEIC KERATOSIS: ICD-10-CM

## 2023-11-29 DIAGNOSIS — D18.0 HEMANGIOMA: ICD-10-CM

## 2023-11-29 DIAGNOSIS — Z71.89 OTHER SPECIFIED COUNSELING: ICD-10-CM

## 2023-11-29 DIAGNOSIS — L82.1 OTHER SEBORRHEIC KERATOSIS: ICD-10-CM

## 2023-11-29 DIAGNOSIS — D22 MELANOCYTIC NEVI: ICD-10-CM

## 2023-11-29 DIAGNOSIS — Z85.828 PERSONAL HISTORY OF OTHER MALIGNANT NEOPLASM OF SKIN: ICD-10-CM

## 2023-11-29 PROBLEM — D18.01 HEMANGIOMA OF SKIN AND SUBCUTANEOUS TISSUE: Status: ACTIVE | Noted: 2023-11-29

## 2023-11-29 PROBLEM — D22.9 MELANOCYTIC NEVI, UNSPECIFIED: Status: ACTIVE | Noted: 2023-11-29

## 2023-11-29 PROCEDURE — 99213 OFFICE O/P EST LOW 20 MIN: CPT | Mod: 25

## 2023-11-29 PROCEDURE — ? LIQUID NITROGEN

## 2023-11-29 PROCEDURE — ? COUNSELING

## 2023-11-29 PROCEDURE — 17110 DESTRUCTION B9 LES UP TO 14: CPT

## 2023-11-29 ASSESSMENT — LOCATION DETAILED DESCRIPTION DERM
LOCATION DETAILED: LEFT MID-UPPER BACK
LOCATION DETAILED: RIGHT POSTERIOR SHOULDER
LOCATION DETAILED: RIGHT SUPERIOR LATERAL UPPER BACK
LOCATION DETAILED: RIGHT SUPERIOR UPPER BACK
LOCATION DETAILED: LEFT SUPERIOR UPPER BACK
LOCATION DETAILED: LEFT INFERIOR UPPER BACK

## 2023-11-29 ASSESSMENT — LOCATION SIMPLE DESCRIPTION DERM
LOCATION SIMPLE: RIGHT UPPER BACK
LOCATION SIMPLE: LEFT UPPER BACK
LOCATION SIMPLE: RIGHT SHOULDER

## 2023-11-29 ASSESSMENT — LOCATION ZONE DERM
LOCATION ZONE: TRUNK
LOCATION ZONE: ARM

## 2023-11-29 NOTE — PROCEDURE: LIQUID NITROGEN
Add 52 Modifier (Optional): no
Spray Paint Text: The liquid nitrogen was applied to the skin utilizing a spray paint frosting technique.
Show Aperture Variable?: Yes
Number Of Freeze-Thaw Cycles: 1 freeze-thaw cycle
Aperture Size (Optional): C
Duration Of Freeze Thaw-Cycle (Seconds): 3
Detail Level: Detailed
Post-Care Instructions: I reviewed with the patient in detail post-care instructions. Patient is to wear sunprotection, and avoid picking at any of the treated lesions. Pt may apply Vaseline to crusted or scabbing areas.
Medical Necessity Information: It is in your best interest to select a reason for this procedure from the list below. All of these items fulfill various CMS LCD requirements except the new and changing color options.
Medical Necessity Clause: This procedure was medically necessary because the lesions that were treated were:
Consent: The patient's consent was obtained including but not limited to risks of crusting, scabbing, blistering, scarring, darker or lighter pigmentary change, recurrence, incomplete removal and infection.

## 2024-02-26 PROBLEM — F01.50 VASCULAR DEMENTIA (HCC): Status: ACTIVE | Noted: 2024-02-26

## 2024-02-26 PROBLEM — F32.4 MAJOR DEPRESSION IN PARTIAL REMISSION (HCC): Status: RESOLVED | Noted: 2022-02-16 | Resolved: 2024-02-26

## 2024-04-08 PROBLEM — F32.1 MODERATE MAJOR DEPRESSION (HCC): Status: ACTIVE | Noted: 2024-04-08

## 2024-04-08 PROBLEM — E11.69 TYPE 2 DIABETES MELLITUS WITH HYPERLIPIDEMIA (HCC): Status: ACTIVE | Noted: 2019-03-25

## 2024-04-08 PROBLEM — N18.2 CKD STAGE 2 DUE TO TYPE 2 DIABETES MELLITUS (HCC): Status: ACTIVE | Noted: 2024-04-08

## 2024-04-08 PROBLEM — E11.42 TYPE 2 DIABETES MELLITUS WITH DIABETIC POLYNEUROPATHY, WITHOUT LONG-TERM CURRENT USE OF INSULIN (HCC): Status: ACTIVE | Noted: 2024-04-08

## 2024-04-08 PROBLEM — L82.1 SEBORRHEIC KERATOSES: Status: ACTIVE | Noted: 2024-04-08

## 2024-04-08 PROBLEM — E78.5 TYPE 2 DIABETES MELLITUS WITH HYPERLIPIDEMIA (HCC): Status: ACTIVE | Noted: 2019-03-25

## 2024-04-08 PROBLEM — E11.22 CKD STAGE 2 DUE TO TYPE 2 DIABETES MELLITUS (HCC): Status: ACTIVE | Noted: 2024-04-08

## 2024-04-08 PROBLEM — L30.8 DERMATITIS ASSOCIATED WITH MOISTURE: Status: ACTIVE | Noted: 2024-04-08

## 2024-07-12 NOTE — PROCEDURE: COUNSELING
1st,overdue reminder letter mailed out to patient   Lab's order   Orders Placed on 5/22/24    Helicobacter Pylori Breath Test, Adult  [facility]  
Detail Level: Detailed
Detail Level: Zone

## 2024-09-19 NOTE — ED PROVIDER NOTES
"ED Provider Note    Scribed for Harshad Kearney M.D. by Erick Watson. 8/6/2020, 5:36 PM.    Primary care provider: Perry Canchola M.D.  Means of arrival: walk-in  History obtained from: patient  History limited by: none    CHIEF COMPLAINT  Chief Complaint   Patient presents with   • Blurred Vision     sudden onset, approx 1 hour ago   • Medication Reaction     took new medication today, concerned for allergic reaction   • Back Pain     sharp upper,  sudden onset approx 10 minutes ago,  \"feels like my heartattack\"       PPE Note: I personally donned full PPE for all patient encounters during this visit, including being clean-shaven with an N95 respirator mask, gloves, and eye protection. Scribe remained outside the patient's room and did not have any contact with the patient for the duration of patient encounter.       RAY Khan is a 80 y.o. male who presents to the Emergency Department with complaints of blurred vision that acute onset 1 hour ago. He reports that he took Zanaflex for the first time about 1 hour ago as well, and he is concerned about an allergic reaction. He states that his symptoms gradually onset, worsened, and then have now improved. He describes it as a \"fogginess\". He reports an associated right sided headache that onset along with his blurred vision. The pain extends into his right jaw and ear. He further reports having some pain and tightness to the right side of his back that started just shortly prior to arrival. Both his headache and back pain have improved since onset. However, he states that the pain feels similar to whe     REVIEW OF SYSTEMS  Review of Systems   Eyes: Positive for blurred vision (resolved).   Musculoskeletal: Positive for back pain.   Neurological: Positive for headaches.   All other systems reviewed and are negative.    PAST MEDICAL HISTORY   has a past medical history of Acute MI ([facility]) (1997), Allergy, Arthritis, Cancer (HCC), High cholesterol, " Hyperlipidemia, Hypertension, Muscle disorder, Personal history of venous thrombosis and embolism (2004), Rectal abscess, Rheumatic fever (as child), Sleep apnea, Stroke (HCC), and Unspecified cataract.    SURGICAL HISTORY   has a past surgical history that includes anal fistulotomy (8/27/08); percut implnt neuroelect,epidural (7/15/2015); percut implnt neuroelect,epidural (7/15/2015); cataract extraction with iol (Bilateral, 2010); stent placement (2005,2010); knee arthrotomy (Right, 1990); knee arthrotomy (Right, 1992); knee arthroscopy (Left, 1995); nerve ulnar transfer (Right, 2004); elbow arthrotomy (Left, 1998); shoulder arthrotomy (Right, 2006); lumbar laminectomy diskectomy (1988); hip arthroplasty total (Left, 2/2015); spinal cord stimulator (7/29/2015); and eye surgery.    SOCIAL HISTORY  Social History     Tobacco Use   • Smoking status: Never Smoker   • Smokeless tobacco: Never Used   Substance Use Topics   • Alcohol use: Yes     Comment: Occasional   • Drug use: No      Social History     Substance and Sexual Activity   Drug Use No       FAMILY HISTORY  Family History   Problem Relation Age of Onset   • Diabetes Mother    • Heart Disease Mother    • Hyperlipidemia Mother        CURRENT MEDICATIONS  Home Medications     Reviewed by Viki Rinaldi R.N. (Registered Nurse) on 08/06/20 at 1612  Med List Status: Partial   Medication Last Dose Status   acetaminophen (TYLENOL) 325 MG Tab  Active   aspirin (ASA) 81 MG Chew Tab chewable tablet  Active   atorvastatin (LIPITOR) 80 MG tablet  Active   Cholecalciferol (VITAMIN D) 2000 UNIT CAPS  Active   DULoxetine (CYMBALTA) 60 MG Cap DR Particles delayed-release capsule  Active   fluticasone (FLONASE) 50 MCG/ACT nasal spray  Active   guaiFENesin ER (MUCINEX) 600 MG TABLET SR 12 HR  Active   HYDROmorphone (DILAUDID) 2 MG Tab  Active   lisinopril-hydrochlorothiazide (PRINZIDE, ZESTORETIC) 20-12.5 MG per tablet  Active   metoprolol SR (TOPROL XL) 25 MG TABLET SR  "24 HR  Active   tramadol (ULTRAM) 50 MG Tab  Active                ALLERGIES  Allergies   Allergen Reactions   • Butrans [Buprenorphine] Anaphylaxis     CYP 3A4 and 3A5 Mutation   • Codeine Anaphylaxis     CYP 3A4 and 3A5 Mutation   • Darvocet [Propoxyphene N-Apap] Anaphylaxis   • Fentanyl Anaphylaxis     CYP 3A4 and 3A5 Mutation   • Nucynta [Tapentadol] Anaphylaxis and Swelling   • Percocet [Oxycodone-Acetaminophen] Anaphylaxis   • Ambien [Zolpidem]      CYP 3A4 and 3A5 Mutation   • Biaxin [Clarithromycin]      CYP 3A4 and 3A5 Mutation   • Buspar [Buspirone]      CYP 3A4 and 3A5 Mutation   • Celexa      CYP 3A4 and 3A5 Mutation   • Citalopram    • Clindamycin    • Clonazepam      CYP 3A4 and 3A5 Mutation   • Demerol    • Diltiazem      CYP 3A4 and 3A5 Mutation   • Doxepin      CYP 3A4 and 3A5 Mutation   • Effexor [Venlafaxine]      CYP 3A4 and 3A5 Mutation   • Erythromycin      CYP 3A4 and 3A5 Mutation   • Haldol [Haloperidol]      CYP 3A4 and 3A5 Mutation   • Hydrocodone    • Lexapro      CYP 3A4 and 3A5 Mutation   • Lunesta      CYP 3A4 and 3A5 Mutation   • Lyrica Swelling   • Morphine Sulfate [Bupropion] Hives   • Remeron [Mirtazapine]      CYP 3A4 and 3A5 Mutation   • Serzone [Nefazodone]      CYP 3A4 and 3A5 Mutation   • Tape    • Valium      CYP 3A4 and 3A5 Mutation   • Xanax [Alprazolam]      CYP 3A4 and 3A5 Mutation   • Zyprexa      CYP 3A4 and 3A5 Mutation       PHYSICAL EXAM  VITAL SIGNS: /53   Pulse 73   Temp 36.5 °C (97.7 °F) (Oral)   Resp 18   Ht 1.727 m (5' 8\")   Wt 99.8 kg (220 lb)   SpO2 96%   BMI 33.45 kg/m²     Constitutional:   No acute distress  HENT:  Moist mucous membranes  Eyes:  PERRLA, EOMI, No conjunctivitis or icterus  Neck:  trachea is midline, no palpable thyroid  Lymphatic:  No cervical lymphadenopathy  Cardiovascular:  Regular rate and rhythm, no murmurs  Thorax & Lungs:  Normal breath sounds, no rhonchi  Abdomen:  Soft, Non-tender  Skin:.  no rash  Back:  Non-tender, no " CVA tenderness  Extremities:   no edema  Vascular:  symmetric radial pulse  Neurologic: Alert & oriented x 3, Answers questions appropriately, No asymmetry to motor or sensation of face, EOMI, No pronator drift, Normal visual fields confrontation, Normal finger to nose, Normal leg raise bilaterally, Normal sensation in all 4 extremities.       LABS  Labs Reviewed   CBC WITH DIFFERENTIAL - Abnormal; Notable for the following components:       Result Value    RBC 4.33 (*)     Hemoglobin 13.9 (*)     MCV 98.2 (*)     MCHC 32.7 (*)     All other components within normal limits   COMP METABOLIC PANEL - Abnormal; Notable for the following components:    Co2 18 (*)     Anion Gap 17.0 (*)     Glucose 212 (*)     Creatinine 1.55 (*)     All other components within normal limits   ESTIMATED GFR - Abnormal; Notable for the following components:    GFR If  53 (*)     GFR If Non  44 (*)     All other components within normal limits   TROPONIN   LIPASE   TROPONIN     All labs reviewed by me.    EKG Interpretation  Interpreted by me  Results for orders placed or performed during the hospital encounter of 20   EKG (NOW)   Result Value Ref Range    Report       [facility] Emergency Dept.    Test Date:  2020  Pt Name:    MAGDA MOREIRA                   Department: ER  MRN:        [id number]                      Room:  Gender:     Male                         Technician: [id number]  :        [id number]                   Requested By:ER TRIAGE PROTOCOL  Order #:    [id number]                    Reading MD: ELKE GARIBAY MD    Measurements  Intervals                                Axis  Rate:       71                           P:          -19  NE:         244                          QRS:        -61  QRSD:       112                          T:          68  QT:         396  QTc:        431    Interpretive Statements  Normal sinus rhythm rate 71 with a normal corrected QT interval  QRS leftward  axis nonspecific changes only are noted  Electronically Signed On 8-6-2020 17:33:48 PDT by ELKE GARIBAY MD         RADIOLOGY  CT-CTA COMPLETE THORACOABDOMINAL AORTA   Final Result         1. No evidence of aortic dissection or aneurysm.      2. Patchy bibasilar opacities, likely atelectasis.      3. Cholelithiasis.            CT-HEAD W/O   Final Result      1.  Cerebral atrophy.      2.  White matter lucencies most consistent with small vessel ischemic change versus demyelination or gliosis.      3.  Otherwise, Head CT without contrast with no acute findings. No evidence of acute cerebral infarction, hemorrhage or mass lesion.      DX-CHEST-PORTABLE (1 VIEW)   Final Result         1. No acute cardiopulmonary abnormalities are identified.        The radiologist's interpretation of all radiological studies have been reviewed by me.    COURSE & MEDICAL DECISION MAKING  Pertinent Labs & Imaging studies reviewed. (See chart for details)    5:36 PM - Patient seen and examined at bedside. Patient will be treated with an LR bolus. Ordered CT-head w/o, CTA-thoracoabdominal aorta, DX-chest, CBC w/ diff, CMP, lipase, troponin, and an EKG to evaluate his symptoms. The differential diagnoses include but are not limited to: adverse drug reaction, MI, intracranial bleed, dissection.      Medical Decision Making:   The patient presented with several symptoms including right-sided arm pain chest pain neck pain acute headache slight blurring and tunneling of the vision.  It was about 10 minutes after he took a new prescription medications Zanaflex.  I did look up the adverse reactions of this drug and hypotension is present in up to 33% of cases.  Any did have low blood pressure for his age.  Patient was given 1 L fluid bolus.  He did have pain in the right chest neck with headache I did get a CT without contrast of the brain there is no bleed this is been within appropriate amount of time to evaluate that.  I was  also concerned about dissection due to the migratory nature of the symptoms and not CTA of the aorta was negative.  His labs are unremarkable.  Troponins 10.  His EKG is normal.  Patient's response after a liter of fluid was good he had no more symptoms and he had a normal blood pressure.  Patient does want to go home.  I cannot say for sure that this medication caused all his problems but is highly suspicious.  If that is the case he should have complete resolution and no recurrence so he does know to return if he does have any recurrence of his symptoms.  They will contact their physician tomorrow for follow-up        FINAL IMPRESSION  1. Adverse effect of drug, initial encounter    2. Visual changes    3. Musculoskeletal pain    4. Nonintractable headache, unspecified chronicity pattern, unspecified headache type          I, Erick Watson (Scribe), am scribing for, and in the presence of, Harshad Kearney M.D..    Electronically signed by: Erick Watson (Scribe), 8/6/2020    IHarshad M.D. personally performed the services described in this documentation, as scribed by Erick Watson in my presence, and it is both accurate and complete.    The note accurately reflects work and decisions made by me.  Harshad Kearney M.D.  8/6/2020  8:09 PM    There are no Wet Read(s) to document.

## 2025-04-15 ENCOUNTER — APPOINTMENT (OUTPATIENT)
Dept: URBAN - METROPOLITAN AREA CLINIC 4 | Facility: CLINIC | Age: 85
Setting detail: DERMATOLOGY
End: 2025-04-15

## 2025-04-15 DIAGNOSIS — D22 MELANOCYTIC NEVI: ICD-10-CM

## 2025-04-15 DIAGNOSIS — L81.4 OTHER MELANIN HYPERPIGMENTATION: ICD-10-CM

## 2025-04-15 DIAGNOSIS — Z85.828 PERSONAL HISTORY OF OTHER MALIGNANT NEOPLASM OF SKIN: ICD-10-CM

## 2025-04-15 DIAGNOSIS — Z71.89 OTHER SPECIFIED COUNSELING: ICD-10-CM

## 2025-04-15 DIAGNOSIS — L82.1 OTHER SEBORRHEIC KERATOSIS: ICD-10-CM

## 2025-04-15 DIAGNOSIS — D18.0 HEMANGIOMA: ICD-10-CM

## 2025-04-15 PROBLEM — D18.01 HEMANGIOMA OF SKIN AND SUBCUTANEOUS TISSUE: Status: ACTIVE | Noted: 2025-04-15

## 2025-04-15 PROBLEM — D22.9 MELANOCYTIC NEVI, UNSPECIFIED: Status: ACTIVE | Noted: 2025-04-15

## 2025-04-15 PROCEDURE — 99213 OFFICE O/P EST LOW 20 MIN: CPT

## 2025-04-15 PROCEDURE — ? COUNSELING

## 2025-04-15 ASSESSMENT — LOCATION ZONE DERM: LOCATION ZONE: ARM

## 2025-04-15 ASSESSMENT — LOCATION SIMPLE DESCRIPTION DERM: LOCATION SIMPLE: RIGHT SHOULDER

## 2025-04-15 ASSESSMENT — LOCATION DETAILED DESCRIPTION DERM: LOCATION DETAILED: RIGHT POSTERIOR SHOULDER

## 2025-07-03 ENCOUNTER — APPOINTMENT (OUTPATIENT)
Dept: URBAN - METROPOLITAN AREA CLINIC 15 | Facility: CLINIC | Age: 85
Setting detail: DERMATOLOGY
End: 2025-07-03

## 2025-07-03 DIAGNOSIS — B35.4 TINEA CORPORIS: ICD-10-CM

## 2025-07-03 DIAGNOSIS — L81.4 OTHER MELANIN HYPERPIGMENTATION: ICD-10-CM

## 2025-07-03 DIAGNOSIS — D22 MELANOCYTIC NEVI: ICD-10-CM

## 2025-07-03 DIAGNOSIS — D18.0 HEMANGIOMA: ICD-10-CM

## 2025-07-03 DIAGNOSIS — Z71.89 OTHER SPECIFIED COUNSELING: ICD-10-CM

## 2025-07-03 DIAGNOSIS — L30.9 DERMATITIS, UNSPECIFIED: ICD-10-CM

## 2025-07-03 DIAGNOSIS — L85.3 XEROSIS CUTIS: ICD-10-CM

## 2025-07-03 DIAGNOSIS — L82.1 OTHER SEBORRHEIC KERATOSIS: ICD-10-CM

## 2025-07-03 PROBLEM — D18.01 HEMANGIOMA OF SKIN AND SUBCUTANEOUS TISSUE: Status: ACTIVE | Noted: 2025-07-03

## 2025-07-03 PROBLEM — D22.9 MELANOCYTIC NEVI, UNSPECIFIED: Status: ACTIVE | Noted: 2025-07-03

## 2025-07-03 PROCEDURE — ? PRESCRIPTION

## 2025-07-03 PROCEDURE — ? MEDICATION COUNSELING

## 2025-07-03 PROCEDURE — ? COUNSELING

## 2025-07-03 PROCEDURE — ? ADDITIONAL NOTES

## 2025-07-03 RX ORDER — TRIAMCINOLONE ACETONIDE 1 MG/G
1 CREAM TOPICAL BID
Qty: 454 | Refills: 3 | Status: ERX | COMMUNITY
Start: 2025-07-03

## 2025-07-03 RX ORDER — KETOCONAZOLE 20 MG/G
1 CREAM TOPICAL BID
Qty: 60 | Refills: 3 | Status: ERX | COMMUNITY
Start: 2025-07-03

## 2025-07-03 RX ADMIN — KETOCONAZOLE 1: 20 CREAM TOPICAL at 00:00

## 2025-07-03 RX ADMIN — TRIAMCINOLONE ACETONIDE 1: 1 CREAM TOPICAL at 00:00

## 2025-07-03 ASSESSMENT — LOCATION SIMPLE DESCRIPTION DERM
LOCATION SIMPLE: UPPER BACK
LOCATION SIMPLE: GROIN
LOCATION SIMPLE: CHEST
LOCATION SIMPLE: RIGHT SHOULDER

## 2025-07-03 ASSESSMENT — LOCATION DETAILED DESCRIPTION DERM
LOCATION DETAILED: LEFT MEDIAL SUPERIOR CHEST
LOCATION DETAILED: RIGHT POSTERIOR SHOULDER
LOCATION DETAILED: LEFT INGUINAL CREASE
LOCATION DETAILED: SUPERIOR THORACIC SPINE

## 2025-07-03 ASSESSMENT — LOCATION ZONE DERM
LOCATION ZONE: TRUNK
LOCATION ZONE: ARM

## 2025-07-03 NOTE — PROCEDURE: MEDICATION COUNSELING
Siliq Pregnancy And Lactation Text: The risk during pregnancy and breastfeeding is uncertain with this medication.
High Dose Vitamin A Pregnancy And Lactation Text: High dose vitamin A therapy is contraindicated during pregnancy and breast feeding.
Zepbound Counseling: I reviewed the possible side effects including: thyroid tumors, kidney disease, gallbladder disease, abdominal pain, constipation, diarrhea, nausea, vomiting and pancreatitis. Do not take this medication if you have a history or family history of multiple endocrine neoplasia syndrome type 2. Side effects reviewed, pt to contact office should one occur.
Clofazimine Counseling:  I discussed with the patient the risks of clofazimine including but not limited to skin and eye pigmentation, liver damage, nausea/vomiting, gastrointestinal bleeding and allergy.
Litfulo Counseling: I discussed with the patient the risks of Litfulo therapy including but not limited to upper respiratory tract infections, shingles, cold sores, and nausea. Live vaccines should be avoided.  This medication has been linked to serious infections; higher rate of mortality; malignancy and lymphoproliferative disorders; major adverse cardiovascular events; thrombosis; gastrointestinal perforations; neutropenia; lymphopenia; anemia; liver enzyme elevations; and lipid elevations.
Rhofade Counseling: Rhofade is a topical medication which can decrease superficial blood flow where applied. Side effects are uncommon and include stinging, redness and allergic reactions.
Thalidomide Counseling: I discussed with the patient the risks of thalidomide including but not limited to birth defects, anxiety, weakness, chest pain, dizziness, cough and severe allergy.
Doxycycline Pregnancy And Lactation Text: This medication is Pregnancy Category D and not consider safe during pregnancy. It is also excreted in breast milk but is considered safe for shorter treatment courses.
Sotyktu Pregnancy And Lactation Text: There is insufficient data to evaluate whether or not Sotyktu is safe to use during pregnancy.   It is not known if Sotyktu passes into breast milk and whether or not it is safe to use when breastfeeding.  
Drysol Pregnancy And Lactation Text: This medication is considered safe during pregnancy and breast feeding.
Cosentyx Pregnancy And Lactation Text: This medication is Pregnancy Category B and is considered safe during pregnancy. It is unknown if this medication is excreted in breast milk.
Adbry Counseling: I discussed with the patient the risks of tralokinumab including but not limited to eye infection and irritation, cold sores, injection site reactions, worsening of asthma, allergic reactions and increased risk of parasitic infection.  Live vaccines should be avoided while taking tralokinumab. The patient understands that monitoring is required and they must alert us or the primary physician if symptoms of infection or other concerning signs are noted.
Topical Steroids Applications Pregnancy And Lactation Text: Most topical steroids are considered safe to use during pregnancy and lactation.  Any topical steroid applied to the breast or nipple should be washed off before breastfeeding.
Simlandi Counseling:  I discussed with the patient the risks of adalimumab including but not limited to myelosuppression, immunosuppression, autoimmune hepatitis, demyelinating diseases, lymphoma, and serious infections.  The patient understands that monitoring is required including a PPD at baseline and must alert us or the primary physician if symptoms of infection or other concerning signs are noted.
Zepbound Pregnancy And Lactation Text: The fetal risk of this medication is unknown and taking while pregnant is not recommended. It is unknown if this medication is present in breast milk.
Thalidomide Pregnancy And Lactation Text: This medication is Pregnancy Category X and is absolutely contraindicated during pregnancy. It is unknown if it is excreted in breast milk.
Litfulo Pregnancy And Lactation Text: Based on animal studies, Lifulo may cause embryo-fetal harm when administered to pregnant women.  The medication should not be used in pregnancy.  Breastfeeding is not recommended during treatment.
Rhofade Pregnancy And Lactation Text: This medication has not been assigned a Pregnancy Risk Category. It is unknown if the medication is excreted in breast milk.
Clofazimine Pregnancy And Lactation Text: This medication is Pregnancy Category C and isn't considered safe during pregnancy. It is excreted in breast milk.
Ebglyss Counseling: I discussed with the patient the risks of lebrikizumab including but not limited to eye inflammation and irritation, cold sores, injection site reactions, allergic reactions and increased risk of parasitic infection. The patient understands that monitoring is required and they must alert us or the primary physician if symptoms of infection or other concerning signs are noted.
Dupixent Counseling: I discussed with the patient the risks of dupilumab including but not limited to eye infection and irritation, cold sores, injection site reactions, worsening of asthma, allergic reactions and increased risk of parasitic infection.  Live vaccines should be avoided while taking dupilumab. Dupilumab will also interact with certain medications such as warfarin and cyclosporine. The patient understands that monitoring is required and they must alert us or the primary physician if symptoms of infection or other concerning signs are noted.
Erythromycin Counseling:  I discussed with the patient the risks of erythromycin including but not limited to GI upset, allergic reaction, drug rash, diarrhea, increase in liver enzymes, and yeast infections.
Elidel Counseling: Patient may experience a mild burning sensation during topical application. Elidel is not approved in children less than 2 years of age. There have been case reports of hematologic and skin malignancies in patients using topical calcineurin inhibitors although causality is questionable.
Adbry Pregnancy And Lactation Text: It is unknown if this medication will adversely affect pregnancy or breast feeding.
Topical Sulfur Applications Counseling: Topical Sulfur Counseling: Patient counseled that this medication may cause skin irritation or allergic reactions.  In the event of skin irritation, the patient was advised to reduce the amount of the drug applied or use it less frequently.   The patient verbalized understanding of the proper use and possible adverse effects of topical sulfur application.  All of the patient's questions and concerns were addressed.
Over the Counter Salicylic Acid Counseling: Over the counter salicylic acid preparations can be used effectively to treat warts at home. There are two types of application: liquid and plaster. Liquid preparations are applied like nail polish and the plaster applications are applied like a bandage (you may need to apply duct tape over the plaster to keep it in place). Dead and macerated skin should be removed regularly with a nail file or nail clippers for best results.
Tranexamic Acid Counseling:  Patient advised of the small risk of bleeding problems with tranexamic acid. They were also instructed to call if they developed any nausea, vomiting or diarrhea. All of the patient's questions and concerns were addressed.
Olumiant Counseling: I discussed with the patient the risks of Olumiant therapy including but not limited to upper respiratory tract infections, shingles, cold sores, and nausea. Live vaccines should be avoided.  This medication has been linked to serious infections; higher rate of mortality; malignancy and lymphoproliferative disorders; major adverse cardiovascular events; thrombosis; gastrointestinal perforations; neutropenia; lymphopenia; anemia; liver enzyme elevations; and lipid elevations.
Colchicine Counseling:  Patient counseled regarding adverse effects including but not limited to stomach upset (nausea, vomiting, stomach pain, or diarrhea).  Patient instructed to limit alcohol consumption while taking this medication.  Colchicine may reduce blood counts especially with prolonged use.  The patient understands that monitoring of kidney function and blood counts may be required, especially at baseline. The patient verbalized understanding of the proper use and possible adverse effects of colchicine.  All of the patient's questions and concerns were addressed.
Bimzelx Counseling:  I discussed with the patient the risks of Bimzelx including but not limited to depression, immunosuppression, allergic reactions and infections.  The patient understands that monitoring is required including a PPD at baseline and must alert us or the primary physician if symptoms of infection or other concerning signs are noted.
Ebglyss Pregnancy And Lactation Text: This medication likely crosses the placenta but the risk for the fetus is uncertain. It is unknown if this medication is excreted in breast milk.
Erythromycin Pregnancy And Lactation Text: This medication is Pregnancy Category B and is considered safe during pregnancy. It is also excreted in breast milk.
Dupixent Pregnancy And Lactation Text: This medication likely crosses the placenta but the risk for the fetus is uncertain. This medication is excreted in breast milk.
Topical Sulfur Applications Pregnancy And Lactation Text: This medication is Pregnancy Category C and has an unknown safety profile during pregnancy. It is unknown if this topical medication is excreted in breast milk.
Simponi Counseling:  I discussed with the patient the risks of golimumab including but not limited to myelosuppression, immunosuppression, autoimmune hepatitis, demyelinating diseases, lymphoma, and serious infections.  The patient understands that monitoring is required including a PPD at baseline and must alert us or the primary physician if symptoms of infection or other concerning signs are noted.
Elidel Pregnancy And Lactation Text: This medication is Pregnancy Category C. It is unknown if this medication is excreted in breast milk.
Aklief counseling:  Patient advised to apply a pea-sized amount only at bedtime and wait 30 minutes after washing their face before applying.  If too drying, patient may add a non-comedogenic moisturizer.  The most commonly reported side effects including irritation, redness, scaling, dryness, stinging, burning, itching, and increased risk of sunburn.  The patient verbalized understanding of the proper use and possible adverse effects of retinoids.  All of the patient's questions and concerns were addressed.
Over The Counter Salicylic Acid Pregnancy And Lactation Text: It is not recommended to use high dose OTC salicylic acid while pregnant. Lower dose topical preparations are considered safe.
Tranexamic Acid Pregnancy And Lactation Text: It is unknown if this medication is safe during pregnancy or breast feeding.
Enbrel Counseling:  I discussed with the patient the risks of etanercept including but not limited to myelosuppression, immunosuppression, autoimmune hepatitis, demyelinating diseases, lymphoma, and infections.  The patient understands that monitoring is required including a PPD at baseline and must alert us or the primary physician if symptoms of infection or other concerning signs are noted.
Olumiant Pregnancy And Lactation Text: Based on animal studies, Olumiant may cause embryo-fetal harm when administered to pregnant women.  The medication should not be used in pregnancy.  Breastfeeding is not recommended during treatment.
Metronidazole Counseling:  I discussed with the patient the risks of metronidazole including but not limited to seizures, nausea/vomiting, a metallic taste in the mouth, nausea/vomiting and severe allergy.
Bimzelx Pregnancy And Lactation Text: This medication crosses the placenta and the safety is uncertain during pregnancy. It is unknown if this medication is present in breast milk.
Eucrisa Counseling: Patient may experience a mild burning sensation during topical application. Eucrisa is not approved in children less than 2 years of age.
Wartpeel Counseling:  I discussed with the patient the risks of Wartpeel including but not limited to erythema, scaling, itching, weeping, crusting, and pain.
Niacinamide Pregnancy And Lactation Text: These medications are considered safe during pregnancy.
Aklief Pregnancy And Lactation Text: It is unknown if this medication is safe to use during pregnancy.  It is unknown if this medication is excreted in breast milk.  Breastfeeding women should use the topical cream on the smallest area of the skin for the shortest time needed while breastfeeding.  Do not apply to nipple and areola.
Valtrex Counseling: I discussed with the patient the risks of valacyclovir including but not limited to kidney damage, nausea, vomiting and severe allergy.  The patient understands that if the infection seems to be worsening or is not improving, they are to call.
Dapsone Counseling: I discussed with the patient the risks of dapsone including but not limited to hemolytic anemia, agranulocytosis, rashes, methemoglobinemia, kidney failure, peripheral neuropathy, headaches, GI upset, and liver toxicity.  Patients who start dapsone require monitoring including baseline LFTs and weekly CBCs for the first month, then every month thereafter.  The patient verbalized understanding of the proper use and possible adverse effects of dapsone.  All of the patient's questions and concerns were addressed.
Rinvoq Counseling: I discussed with the patient the risks of Rinvoq therapy including but not limited to upper respiratory tract infections, shingles, cold sores, bronchitis, nausea, cough, fever, acne, and headache. Live vaccines should be avoided.  This medication has been linked to serious infections; higher rate of mortality; malignancy and lymphoproliferative disorders; major adverse cardiovascular events; thrombosis; thrombocytopenia, anemia, and neutropenia; lipid elevations; liver enzyme elevations; and gastrointestinal perforations.
Cimzia Counseling:  I discussed with the patient the risks of Cimzia including but not limited to immunosuppression, allergic reactions and infections.  The patient understands that monitoring is required including a PPD at baseline and must alert us or the primary physician if symptoms of infection or other concerning signs are noted.
Solaraze Counseling:  I discussed with the patient the risks of Solaraze including but not limited to erythema, scaling, itching, weeping, crusting, and pain.
Metronidazole Pregnancy And Lactation Text: This medication is Pregnancy Category B and considered safe during pregnancy.  It is also excreted in breast milk.
Ketoconazole Pregnancy And Lactation Text: This medication is Pregnancy Category C and it isn't know if it is safe during pregnancy. It is also excreted in breast milk and breast feeding isn't recommended.
Cyclosporine Pregnancy And Lactation Text: This medication is Pregnancy Category C and it isn't know if it is safe during pregnancy. This medication is excreted in breast milk.
Oxybutynin Pregnancy And Lactation Text: This medication is Pregnancy Category B and is considered safe during pregnancy. It is unknown if it is excreted in breast milk.
Calcipotriene Pregnancy And Lactation Text: The use of this medication during pregnancy or lactation is not recommended as there is insufficient data.
Cephalexin Counseling: I counseled the patient regarding use of cephalexin as an antibiotic for prophylactic and/or therapeutic purposes. Cephalexin (commonly prescribed under brand name Keflex) is a cephalosporin antibiotic which is active against numerous classes of bacteria, including most skin bacteria. Side effects may include nausea, diarrhea, gastrointestinal upset, rash, hives, yeast infections, and in rare cases, hepatitis, kidney disease, seizures, fever, confusion, neurologic symptoms, and others. Patients with severe allergies to penicillin medications are cautioned that there is about a 10% incidence of cross-reactivity with cephalosporins. When possible, patients with penicillin allergies should use alternatives to cephalosporins for antibiotic therapy.
Topical Ketoconazole Counseling: Patient counseled that this medication may cause skin irritation or allergic reactions.  In the event of skin irritation, the patient was advised to reduce the amount of the drug applied or use it less frequently.   The patient verbalized understanding of the proper use and possible adverse effects of ketoconazole.  All of the patient's questions and concerns were addressed.
Niacinamide Counseling: I recommended taking niacin or niacinamide, also know as vitamin B3, twice daily. Recent evidence suggests that taking vitamin B3 (500 mg twice daily) can reduce the risk of actinic keratoses and non-melanoma skin cancers. Side effects of vitamin B3 include flushing and headache.
Nemluvio Pregnancy And Lactation Text: It is not known if Nemluvio causes fetal harm or is present in breast milk. Please proceed with caution if patients who are pregnant or breastfeeding.
Bexarotene Pregnancy And Lactation Text: This medication is Pregnancy Category X and should not be given to women who are pregnant or may become pregnant. This medication should not be used if you are breast feeding.
Spironolactone Pregnancy And Lactation Text: This medication can cause feminization of the male fetus and should be avoided during pregnancy. The active metabolite is also found in breast milk.
Tetracycline Pregnancy And Lactation Text: This medication is Pregnancy Category D and not consider safe during pregnancy. It is also excreted in breast milk.
Semaglutide Counseling: I reviewed the possible side effects including: thyroid tumors, kidney disease, gallbladder disease, abdominal pain, constipation, diarrhea, nausea, vomiting and pancreatitis. Do not take this medication if you have a history or family history of multiple endocrine neoplasia syndrome type 2. Side effects reviewed, pt to contact office should one occur.
Detail Level: Simple
Protopic Counseling: Patient may experience a mild burning sensation during topical application. Protopic is not approved in children less than 2 years of age. There have been case reports of hematologic and skin malignancies in patients using topical calcineurin inhibitors although causality is questionable.
Terbinafine Counseling: Patient counseling regarding adverse effects of terbinafine including but not limited to headache, diarrhea, rash, upset stomach, liver function test abnormalities, itching, taste/smell disturbance, nausea, abdominal pain, and flatulence.  There is a rare possibility of liver failure that can occur when taking terbinafine.  The patient understands that a baseline LFT and kidney function test may be required. The patient verbalized understanding of the proper use and possible adverse effects of terbinafine.  All of the patient's questions and concerns were addressed.
Propranolol Counseling:  I discussed with the patient the risks of propranolol including but not limited to low heart rate, low blood pressure, low blood sugar, restlessness and increased cold sensitivity. They should call the office if they experience any of these side effects.
Methotrexate Counseling:  Patient counseled regarding adverse effects of methotrexate including but not limited to nausea, vomiting, abnormalities in liver function tests. Patients may develop mouth sores, rash, diarrhea, and abnormalities in blood counts. The patient understands that monitoring is required including LFT's and blood counts.  There is a rare possibility of scarring of the liver and lung problems that can occur when taking methotrexate. Persistent nausea, loss of appetite, pale stools, dark urine, cough, and shortness of breath should be reported immediately. Patient advised to discontinue methotrexate treatment at least three months before attempting to become pregnant.  I discussed the need for folate supplements while taking methotrexate.  These supplements can decrease side effects during methotrexate treatment. The patient verbalized understanding of the proper use and possible adverse effects of methotrexate.  All of the patient's questions and concerns were addressed.
Xolair Counseling:  Patient informed of potential adverse effects including but not limited to fever, muscle aches, rash and allergic reactions.  The patient verbalized understanding of the proper use and possible adverse effects of Xolair.  All of the patient's questions and concerns were addressed.
Cephalexin Pregnancy And Lactation Text: This medication is Pregnancy Category B and considered safe during pregnancy.  It is also excreted in breast milk but can be used safely for shorter doses.
Cantharidin Counseling:  I discussed with the patient the risks of Cantharidin including but not limited to pain, redness, burning, itching, and blistering.
Isotretinoin Counseling: Patient should get monthly blood tests, not donate blood, not drive at night if vision affected, not share medication, and not undergo elective surgery for 6 months after tx completed. Side effects reviewed, pt to contact office should one occur.
Rituxan Counseling:  I discussed with the patient the risks of Rituxan infusions. Side effects can include infusion reactions, severe drug rashes including mucocutaneous reactions, reactivation of latent hepatitis and other infections and rarely progressive multifocal leukoencephalopathy.  All of the patient's questions and concerns were addressed.
Methotrexate Pregnancy And Lactation Text: This medication is Pregnancy Category X and is known to cause fetal harm. This medication is excreted in breast milk.
Terbinafine Pregnancy And Lactation Text: This medication is Pregnancy Category B and is considered safe during pregnancy. It is also excreted in breast milk and breast feeding isn't recommended.
Propranolol Pregnancy And Lactation Text: This medication is Pregnancy Category C and it isn't known if it is safe during pregnancy. It is excreted in breast milk.
Protopic Pregnancy And Lactation Text: This medication is Pregnancy Category C. It is unknown if this medication is excreted in breast milk when applied topically.
Clindamycin Counseling: I counseled the patient regarding use of clindamycin as an antibiotic for prophylactic and/or therapeutic purposes. Clindamycin is active against numerous classes of bacteria, including skin bacteria. Side effects may include nausea, diarrhea, gastrointestinal upset, rash, hives, yeast infections, and in rare cases, colitis.
Xolair Pregnancy And Lactation Text: This medication is Pregnancy Category B and is considered safe during pregnancy. This medication is excreted in breast milk.
5-Fu Counseling: 5-Fluorouracil Counseling:  I discussed with the patient the risks of 5-fluorouracil including but not limited to erythema, scaling, itching, weeping, crusting, and pain.
Rituxan Pregnancy And Lactation Text: This medication is Pregnancy Category C and it isn't know if it is safe during pregnancy. It is unknown if this medication is excreted in breast milk but similar antibodies are known to be excreted.
Isotretinoin Pregnancy And Lactation Text: This medication is Pregnancy Category X and is considered extremely dangerous during pregnancy. It is unknown if it is excreted in breast milk.
Topical Metronidazole Counseling: Metronidazole is a topical antibiotic medication. You may experience burning, stinging, redness, or allergic reactions.  Please call our office if you develop any problems from using this medication.
Qbrexza Counseling:  I discussed with the patient the risks of Qbrexza including but not limited to headache, mydriasis, blurred vision, dry eyes, nasal dryness, dry mouth, dry throat, dry skin, urinary hesitation, and constipation.  Local skin reactions including erythema, burning, stinging, and itching can also occur.
SSKI Counseling:  I discussed with the patient the risks of SSKI including but not limited to thyroid abnormalities, metallic taste, GI upset, fever, headache, acne, arthralgias, paraesthesias, lymphadenopathy, easy bleeding, arrhythmias, and allergic reaction.
Wegovy Counseling: I reviewed the possible side effects including: thyroid tumors, kidney disease, gallbladder disease, abdominal pain, constipation, diarrhea, nausea, vomiting and pancreatitis. Do not take this medication if you have a history or family history of multiple endocrine neoplasia syndrome type 2. Side effects reviewed, pt to contact office should one occur.
Cibinqo Counseling: I discussed with the patient the risks of Cibinqo therapy including but not limited to common cold, nausea, headache, cold sores, increased blood CPK levels, dizziness, UTIs, fatigue, acne, and vomitting. Live vaccines should be avoided.  This medication has been linked to serious infections; higher rate of mortality; malignancy and lymphoproliferative disorders; major adverse cardiovascular events; thrombosis; thrombocytopenia and lymphopenia; lipid elevations; and retinal detachment.
Clindamycin Pregnancy And Lactation Text: This medication can be used in pregnancy if certain situations. Clindamycin is also present in breast milk.
Arava Counseling:  Patient counseled regarding adverse effects of Arava including but not limited to nausea, vomiting, abnormalities in liver function tests. Patients may develop mouth sores, rash, diarrhea, and abnormalities in blood counts. The patient understands that monitoring is required including LFTs and blood counts.  There is a rare possibility of scarring of the liver and lung problems that can occur when taking methotrexate. Persistent nausea, loss of appetite, pale stools, dark urine, cough, and shortness of breath should be reported immediately. Patient advised to discontinue Arava treatment and consult with a physician prior to attempting conception. The patient will have to undergo a treatment to eliminate Arava from the body prior to conception.
Topical Metronidazole Pregnancy And Lactation Text: This medication is Pregnancy Category B and considered safe during pregnancy.  It is also considered safe to use while breastfeeding.
High Dose Vitamin A Counseling: Side effects reviewed, pt to contact office should one occur.
5-Fu Pregnancy And Lactation Text: This medication is Pregnancy Category X and contraindicated in pregnancy and in women who may become pregnant. It is unknown if this medication is excreted in breast milk.
Siliq Counseling:  I discussed with the patient the risks of Siliq including but not limited to new or worsening depression, suicidal thoughts and behavior, immunosuppression, malignancy, posterior leukoencephalopathy syndrome, and serious infections.  The patient understands that monitoring is required including a PPD at baseline and must alert us or the primary physician if symptoms of infection or other concerning signs are noted. There is also a special program designed to monitor depression which is required with Siliq.
Qbrexza Pregnancy And Lactation Text: There is no available data on Qbrexza use in pregnant women.  There is no available data on Qbrexza use in lactation.
Cibinqo Pregnancy And Lactation Text: It is unknown if this medication will adversely affect pregnancy or breast feeding.  You should not take this medication if you are currently pregnant or planning a pregnancy or while breastfeeding.
Doxycycline Counseling:  Patient counseled regarding possible photosensitivity and increased risk for sunburn.  Patient instructed to avoid sunlight, if possible.  When exposed to sunlight, patients should wear protective clothing, sunglasses, and sunscreen.  The patient was instructed to call the office immediately if the following severe adverse effects occur:  hearing changes, easy bruising/bleeding, severe headache, or vision changes.  The patient verbalized understanding of the proper use and possible adverse effects of doxycycline.  All of the patient's questions and concerns were addressed.
Sski Pregnancy And Lactation Text: This medication is Pregnancy Category D and isn't considered safe during pregnancy. It is excreted in breast milk.
Drysol Counseling:  I discussed with the patient the risks of drysol/aluminum chloride including but not limited to skin rash, itching, irritation, burning.
Sotyktu Counseling:  I discussed the most common side effects of Sotyktu including: common cold, sore throat, sinus infections, cold sores, canker sores, folliculitis, and acne.  I also discussed more serious side effects of Sotyktu including but not limited to: serious allergic reactions; increased risk for infections such as TB; cancers such as lymphomas; rhabdomyolysis and elevated CPK; and elevated triglycerides and liver enzymes. 
Topical Steroids Counseling: I discussed with the patient that prolonged use of topical steroids can result in the increased appearance of superficial blood vessels (telangiectasias), lightening (hypopigmentation) and thinning of the skin (atrophy).  Patient understands to avoid using high potency steroids in skin folds, the groin or the face.  The patient verbalized understanding of the proper use and possible adverse effects of topical steroids.  All of the patient's questions and concerns were addressed.
Benzoyl Peroxide Counseling: Patient counseled that medicine may cause skin irritation and bleach clothing.  In the event of skin irritation, the patient was advised to reduce the amount of the drug applied or use it less frequently.   The patient verbalized understanding of the proper use and possible adverse effects of benzoyl peroxide.  All of the patient's questions and concerns were addressed.
Ilumya Counseling: I discussed with the patient the risks of tildrakizumab including but not limited to immunosuppression, malignancy, posterior leukoencephalopathy syndrome, and serious infections.  The patient understands that monitoring is required including a PPD at baseline and must alert us or the primary physician if symptoms of infection or other concerning signs are noted.
Rifampin Counseling: I discussed with the patient the risks of rifampin including but not limited to liver damage, kidney damage, red-orange body fluids, nausea/vomiting and severe allergy.
Finasteride Female Counseling: Finasteride Counseling:  I discussed with the patient the risks of use of finasteride including but not limited to decreased libido and sexual dysfunction. Explained the teratogenic nature of the medication and stressed the importance of not getting pregnant during treatment. All of the patient's questions and concerns were addressed.
Glycopyrrolate Pregnancy And Lactation Text: This medication is Pregnancy Category B and is considered safe during pregnancy. It is unknown if it is excreted breast milk.
Zoryve Counseling:  I discussed with the patient that Zoryve is not for use in the eyes, mouth or vagina. The most commonly reported side effects include diarrhea, headache, insomnia, application site pain, upper respiratory tract infections, and urinary tract infections.  All of the patient's questions and concerns were addressed.
Griseofulvin Pregnancy And Lactation Text: This medication is Pregnancy Category X and is known to cause serious birth defects. It is unknown if this medication is excreted in breast milk but breast feeding should be avoided.
Oral Minoxidil Pregnancy And Lactation Text: This medication should only be used when clearly needed if you are pregnant, attempting to become pregnant or breast feeding.
Klisyri Counseling:  I discussed with the patient the risks of Klisyri including but not limited to erythema, scaling, itching, weeping, crusting, and pain.
Cellcept Pregnancy And Lactation Text: This medication is Pregnancy Category D and isn't considered safe during pregnancy. It is unknown if this medication is excreted in breast milk.
Doxepin Counseling:  Patient advised that the medication is sedating and not to drive a car after taking this medication. Patient informed of potential adverse effects including but not limited to dry mouth, urinary retention, and blurry vision.  The patient verbalized understanding of the proper use and possible adverse effects of doxepin.  All of the patient's questions and concerns were addressed.
Benzoyl Peroxide Pregnancy And Lactation Text: This medication is Pregnancy Category C. It is unknown if benzoyl peroxide is excreted in breast milk.
Libtayo Counseling- I discussed with the patient the risks of Libtayo including but not limited to nausea, vomiting, diarrhea, and bone or muscle pain.  The patient verbalized understanding of the proper use and possible adverse effects of Libtayo.  All of the patient's questions and concerns were addressed.
Azithromycin Counseling:  I discussed with the patient the risks of azithromycin including but not limited to GI upset, allergic reaction, drug rash, diarrhea, and yeast infections.
Tazorac Counseling:  Patient advised that medication is irritating and drying.  Patient may need to apply sparingly and wash off after an hour before eventually leaving it on overnight.  The patient verbalized understanding of the proper use and possible adverse effects of tazorac.  All of the patient's questions and concerns were addressed.
Finasteride Pregnancy And Lactation Text: This medication is absolutely contraindicated during pregnancy. It is unknown if it is excreted in breast milk.
Rifampin Pregnancy And Lactation Text: This medication is Pregnancy Category C and it isn't know if it is safe during pregnancy. It is also excreted in breast milk and should not be used if you are breast feeding.
Hydroxychloroquine Counseling:  I discussed with the patient that a baseline ophthalmologic exam is needed at the start of therapy and every year thereafter while on therapy. A CBC may also be warranted for monitoring.  The side effects of this medication were discussed with the patient, including but not limited to agranulocytosis, aplastic anemia, seizures, rashes, retinopathy, and liver toxicity. Patient instructed to call the office should any adverse effect occur.  The patient verbalized understanding of the proper use and possible adverse effects of Plaquenil.  All the patient's questions and concerns were addressed.
Klisyri Pregnancy And Lactation Text: It is unknown if this medication can harm a developing fetus or if it is excreted in breast milk.
Ozempic Counseling: I reviewed the possible side effects including: thyroid tumors, kidney disease, gallbladder disease, abdominal pain, constipation, diarrhea, nausea, vomiting and pancreatitis. Do not take this medication if you have a history or family history of multiple endocrine neoplasia syndrome type 2. Side effects reviewed, pt to contact office should one occur.
Zoryve Pregnancy And Lactation Text: It is unknown if this medication can cause problems during pregnancy and breastfeeding.
Cyclophosphamide Counseling:  I discussed with the patient the risks of cyclophosphamide including but not limited to hair loss, hormonal abnormalities, decreased fertility, abdominal pain, diarrhea, nausea and vomiting, bone marrow suppression and infection. The patient understands that monitoring is required while taking this medication.
Opzelura Counseling:  I discussed with the patient the risks of Opzelura including but not limited to nasopharngitis, bronchitis, ear infection, eosinophila, hives, diarrhea, folliculitis, tonsillitis, and rhinorrhea.  Taken orally, this medication has been linked to serious infections; higher rate of mortality; malignancy and lymphoproliferative disorders; major adverse cardiovascular events; thrombosis; thrombocytopenia, anemia, and neutropenia; and lipid elevations.
Itraconazole Counseling:  I discussed with the patient the risks of itraconazole including but not limited to liver damage, nausea/vomiting, neuropathy, and severe allergy.  The patient understands that this medication is best absorbed when taken with acidic beverages such as non-diet cola or ginger ale.  The patient understands that monitoring is required including baseline LFTs and repeat LFTs at intervals.  The patient understands that they are to contact us or the primary physician if concerning signs are noted.
Otezla Counseling: The side effects of Otezla were discussed with the patient, including but not limited to worsening or new depression, weight loss, diarrhea, nausea, upper respiratory tract infection, and headache. Patient instructed to call the office should any adverse effect occur.  The patient verbalized understanding of the proper use and possible adverse effects of Otezla.  All the patient's questions and concerns were addressed.
Taltz Counseling: I discussed with the patient the risks of ixekizumab including but not limited to immunosuppression, serious infections, worsening of inflammatory bowel disease and drug reactions.  The patient understands that monitoring is required including a PPD at baseline and must alert us or the primary physician if symptoms of infection or other concerning signs are noted.
Azithromycin Pregnancy And Lactation Text: This medication is considered safe during pregnancy and is also secreted in breast milk.
Doxepin Pregnancy And Lactation Text: This medication is Pregnancy Category C and it isn't known if it is safe during pregnancy. It is also excreted in breast milk and breast feeding isn't recommended.
Libtayo Pregnancy And Lactation Text: This medication is contraindicated in pregnancy and when breast feeding.
Birth Control Pills Counseling: Birth Control Pill Counseling: I discussed with the patient the potential side effects of OCPs including but not limited to increased risk of stroke, heart attack, thrombophlebitis, deep venous thrombosis, hepatic adenomas, breast changes, GI upset, headaches, and depression.  The patient verbalized understanding of the proper use and possible adverse effects of OCPs. All of the patient's questions and concerns were addressed.
Carac Counseling:  I discussed with the patient the risks of Carac including but not limited to erythema, scaling, itching, weeping, crusting, and pain.
Hydroxychloroquine Pregnancy And Lactation Text: This medication has been shown to cause fetal harm but it isn't assigned a Pregnancy Risk Category. There are small amounts excreted in breast milk.
Acitretin Counseling:  I discussed with the patient the risks of acitretin including but not limited to hair loss, dry lips/skin/eyes, liver damage, hyperlipidemia, depression/suicidal ideation, photosensitivity.  Serious rare side effects can include but are not limited to pancreatitis, pseudotumor cerebri, bony changes, clot formation/stroke/heart attack.  Patient understands that alcohol is contraindicated since it can result in liver toxicity and significantly prolong the elimination of the drug by many years.
Tazorac Pregnancy And Lactation Text: This medication is not safe during pregnancy. It is unknown if this medication is excreted in breast milk.
Infliximab Counseling:  I discussed with the patient the risks of infliximab including but not limited to myelosuppression, immunosuppression, autoimmune hepatitis, demyelinating diseases, lymphoma, and serious infections.  The patient understands that monitoring is required including a PPD at baseline and must alert us or the primary physician if symptoms of infection or other concerning signs are noted.
Cyclophosphamide Pregnancy And Lactation Text: This medication is Pregnancy Category D and it isn't considered safe during pregnancy. This medication is excreted in breast milk.
Latisse Counseling: Lattise Counseling: I reviewed the possible side-effects of Latisse including itching, eye irritation, discoloration and exacerbating glaucoma. I also discussed application methods.
Itraconazole Pregnancy And Lactation Text: This medication is Pregnancy Category C and it isn't know if it is safe during pregnancy. It is also excreted in breast milk.
Sarecycline Counseling: Patient advised regarding possible photosensitivity and discoloration of the teeth, skin, lips, tongue and gums.  Patient instructed to avoid sunlight, if possible.  When exposed to sunlight, patients should wear protective clothing, sunglasses, and sunscreen.  The patient was instructed to call the office immediately if the following severe adverse effects occur:  hearing changes, easy bruising/bleeding, severe headache, or vision changes.  The patient verbalized understanding of the proper use and possible adverse effects of sarecycline.  All of the patient's questions and concerns were addressed.
Otezla Pregnancy And Lactation Text: This medication is Pregnancy Category C and it isn't known if it is safe during pregnancy. It is unknown if it is excreted in breast milk.
Zyclara Counseling:  I discussed with the patient the risks of imiquimod including but not limited to erythema, scaling, itching, weeping, crusting, and pain.  Patient understands that the inflammatory response to imiquimod is variable from person to person and was educated regarded proper titration schedule.  If flu-like symptoms develop, patient knows to discontinue the medication and contact us.
Hydroxyzine Counseling: Patient advised that the medication is sedating and not to drive a car after taking this medication.  Patient informed of potential adverse effects including but not limited to dry mouth, urinary retention, and blurry vision.  The patient verbalized understanding of the proper use and possible adverse effects of hydroxyzine.  All of the patient's questions and concerns were addressed.
Opzelura Pregnancy And Lactation Text: There is insufficient data to evaluate drug-associated risk for major birth defects, miscarriage, or other adverse maternal or fetal outcomes.  There is a pregnancy registry that monitors pregnancy outcomes in pregnant persons exposed to the medication during pregnancy.  It is unknown if this medication is excreted in breast milk.  Do not breastfeed during treatment and for about 4 weeks after the last dose.
Bactrim Counseling:  I discussed with the patient the risks of sulfa antibiotics including but not limited to GI upset, allergic reaction, drug rash, diarrhea, dizziness, photosensitivity, and yeast infections.  Rarely, more serious reactions can occur including but not limited to aplastic anemia, agranulocytosis, methemoglobinemia, blood dyscrasias, liver or kidney failure, lung infiltrates or desquamative/blistering drug rashes.
Odomzo Counseling- I discussed with the patient the risks of Odomzo including but not limited to nausea, vomiting, diarrhea, constipation, weight loss, changes in the sense of taste, decreased appetite, muscle spasms, and hair loss.  The patient verbalized understanding of the proper use and possible adverse effects of Odomzo.  All of the patient's questions and concerns were addressed.
Birth Control Pills Pregnancy And Lactation Text: This medication should be avoided if pregnant and for the first 30 days post-partum.
Acitretin Pregnancy And Lactation Text: This medication is Pregnancy Category X and should not be given to women who are pregnant or may become pregnant in the future. This medication is excreted in breast milk.
Topical Clindamycin Counseling: Patient counseled that this medication may cause skin irritation or allergic reactions.  In the event of skin irritation, the patient was advised to reduce the amount of the drug applied or use it less frequently.   The patient verbalized understanding of the proper use and possible adverse effects of clindamycin.  All of the patient's questions and concerns were addressed.
Cyclosporine Counseling:  I discussed with the patient the risks of cyclosporine including but not limited to hypertension, gingival hyperplasia,myelosuppression, immunosuppression, liver damage, kidney damage, neurotoxicity, lymphoma, and serious infections. The patient understands that monitoring is required including baseline blood pressure, CBC, CMP, lipid panel and uric acid, and then 1-2 times monthly CMP and blood pressure.
Low Dose Naltrexone Counseling- I discussed with the patient the potential risks and side effects of low dose naltrexone including but not limited to: more vivid dreams, headaches, nausea, vomiting, abdominal pain, fatigue, dizziness, and anxiety.
Saxenda Counseling: I reviewed the possible side effects including: thyroid tumors, kidney disease, gallbladder disease, abdominal pain, constipation, diarrhea, nausea, vomiting and pancreatitis. Do not take this medication if you have a history or family history of multiple endocrine neoplasia syndrome type 2. Side effects reviewed, pt to contact office should one occur.
Latisse Pregnancy And Lactation Text: It is not recommended to use Latisse if you are pregnant or trying to become pregnant.
Tremfya Counseling: I discussed with the patient the risks of guselkumab including but not limited to immunosuppression, serious infections, worsening of inflammatory bowel disease and drug reactions.  The patient understands that monitoring is required including a PPD at baseline and must alert us or the primary physician if symptoms of infection or other concerning signs are noted.
Oxybutynin Counseling:  I discussed with the patient the risks of oxybutynin including but not limited to skin rash, drowsiness, dry mouth, difficulty urinating, and blurred vision.
Hydroxyzine Pregnancy And Lactation Text: This medication is not safe during pregnancy and should not be taken. It is also excreted in breast milk and breast feeding isn't recommended.
Ketoconazole Counseling:   Patient counseled regarding improving absorption with orange juice.  Adverse effects include but are not limited to breast enlargement, headache, diarrhea, nausea, upset stomach, liver function test abnormalities, taste disturbance, and stomach pain.  There is a rare possibility of liver failure that can occur when taking ketoconazole. The patient understands that monitoring of LFTs may be required, especially at baseline. The patient verbalized understanding of the proper use and possible adverse effects of ketoconazole.  All of the patient's questions and concerns were addressed.
Bactrim Pregnancy And Lactation Text: This medication is Pregnancy Category D and is known to cause fetal risk.  It is also excreted in breast milk.
Picato Counseling:  I discussed with the patient the risks of Picato including but not limited to erythema, scaling, itching, weeping, crusting, and pain.
Calcipotriene Counseling:  I discussed with the patient the risks of calcipotriene including but not limited to erythema, scaling, itching, and irritation.
Low Dose Naltrexone Pregnancy And Lactation Text: Naltrexone is pregnancy category C.  There have been no adequate and well-controlled studies in pregnant women.  It should be used in pregnancy only if the potential benefit justifies the potential risk to the fetus.   Limited data indicates that naltrexone is minimally excreted into breastmilk.
Tetracycline Counseling: Patient counseled regarding possible photosensitivity and increased risk for sunburn.  Patient instructed to avoid sunlight, if possible.  When exposed to sunlight, patients should wear protective clothing, sunglasses, and sunscreen.  The patient was instructed to call the office immediately if the following severe adverse effects occur:  hearing changes, easy bruising/bleeding, severe headache, or vision changes.  The patient verbalized understanding of the proper use and possible adverse effects of tetracycline.  All of the patient's questions and concerns were addressed. Patient understands to avoid pregnancy while on therapy due to potential birth defects.
Bexarotene Counseling:  I discussed with the patient the risks of bexarotene including but not limited to hair loss, dry lips/skin/eyes, liver abnormalities, hyperlipidemia, pancreatitis, depression/suicidal ideation, photosensitivity, drug rash/allergic reactions, hypothyroidism, anemia, leukopenia, infection, cataracts, and teratogenicity.  Patient understands that they will need regular blood tests to check lipid profile, liver function tests, white blood cell count, thyroid function tests and pregnancy test if applicable.
Nemluvio Counseling: I discussed with the patient the risks of nemolizumab including but not limited to headache, gastrointestinal complaints, nasopharyngitis, musculoskeletal complaints, injection site reactions, and allergic reactions. The patient understands that monitoring is required and they must alert us or the primary physician if any side effects are noted.
Spironolactone Counseling: Patient advised regarding risks of diarrhea, abdominal pain, hyperkalemia, birth defects (for female patients), liver toxicity and renal toxicity. The patient may need blood work to monitor liver and kidney function and potassium levels while on therapy. The patient verbalized understanding of the proper use and possible adverse effects of spironolactone.  All of the patient's questions and concerns were addressed.
Eucrisa Pregnancy And Lactation Text: This medication has not been assigned a Pregnancy Risk Category but animal studies failed to show danger with the topical medication. It is unknown if the medication is excreted in breast milk.
Nsaids Counseling: NSAID Counseling: I discussed with the patient that NSAIDs should be taken with food. Prolonged use of NSAIDs can result in the development of stomach ulcers.  Patient advised to stop taking NSAIDs if abdominal pain occurs.  The patient verbalized understanding of the proper use and possible adverse effects of NSAIDs.  All of the patient's questions and concerns were addressed.
Skyrizi Counseling: I discussed with the patient the risks of risankizumab-rzaa including but not limited to immunosuppression, and serious infections.  The patient understands that monitoring is required including a PPD at baseline and must alert us or the primary physician if symptoms of infection or other concerning signs are noted.
Prednisone Counseling:  I discussed with the patient the risks of prolonged use of prednisone including but not limited to weight gain, insomnia, osteoporosis, mood changes, diabetes, susceptibility to infection, glaucoma and high blood pressure.  In cases where prednisone use is prolonged, patients should be monitored with blood pressure checks, serum glucose levels and an eye exam.  Additionally, the patient may need to be placed on GI prophylaxis, PCP prophylaxis, and calcium and vitamin D supplementation and/or a bisphosphonate.  The patient verbalized understanding of the proper use and the possible adverse effects of prednisone.  All of the patient's questions and concerns were addressed.
Dutasteride Male Counseling: Dutasteride Counseling:  I discussed with the patient the risks of use of dutasteride including but not limited to decreased libido, decreased ejaculate volume, and gynecomastia. Women who can become pregnant should not handle medication.  All of the patient's questions and concerns were addressed.
Humira Counseling:  I discussed with the patient the risks of adalimumab including but not limited to myelosuppression, immunosuppression, autoimmune hepatitis, demyelinating diseases, lymphoma, and serious infections.  The patient understands that monitoring is required including a PPD at baseline and must alert us or the primary physician if symptoms of infection or other concerning signs are noted.
Amzeeq Counseling: Amzeeq is a topical antibiotic foam which contains minocycline.  The most commonly reported side effect of Amzeeq is headache.  The medication is flammable and should not be applied near a fire, flame, or while smoking.  Sun precautions is recommended to prevent sunburn.
Opioid Counseling: I discussed with the patient the potential side effects of opioids including but not limited to addiction, altered mental status, and depression. I stressed avoiding alcohol, benzodiazepines, muscle relaxants and sleep aids unless specifically okayed by a physician. The patient verbalized understanding of the proper use and possible adverse effects of opioids. All of the patient's questions and concerns were addressed. They were instructed to flush the remaining pills down the toilet if they did not need them for pain.
Valtrex Pregnancy And Lactation Text: this medication is Pregnancy Category B and is considered safe during pregnancy. This medication is not directly found in breast milk but it's metabolite acyclovir is present.
Rinvoq Pregnancy And Lactation Text: Based on animal studies, Rinvoq may cause embryo-fetal harm when administered to pregnant women.  The medication should not be used in pregnancy.  Breastfeeding is not recommended during treatment and for 6 days after the last dose.
Albendazole Counseling:  I discussed with the patient the risks of albendazole including but not limited to cytopenia, kidney damage, nausea/vomiting and severe allergy.  The patient understands that this medication is being used in an off-label manner.
Solaraze Pregnancy And Lactation Text: This medication is Pregnancy Category B and is considered safe. There is some data to suggest avoiding during the third trimester. It is unknown if this medication is excreted in breast milk.
Minocycline Counseling: Patient advised regarding possible photosensitivity and discoloration of the teeth, skin, lips, tongue and gums.  Patient instructed to avoid sunlight, if possible.  When exposed to sunlight, patients should wear protective clothing, sunglasses, and sunscreen.  The patient was instructed to call the office immediately if the following severe adverse effects occur:  hearing changes, easy bruising/bleeding, severe headache, or vision changes.  The patient verbalized understanding of the proper use and possible adverse effects of minocycline.  All of the patient's questions and concerns were addressed.
Dapsone Pregnancy And Lactation Text: This medication is Pregnancy Category C and is not considered safe during pregnancy or breast feeding.
Hydroquinone Counseling:  Patient advised that medication may result in skin irritation, lightening (hypopigmentation), dryness, and burning.  In the event of skin irritation, the patient was advised to reduce the amount of the drug applied or use it less frequently.  Rarely, spots that are treated with hydroquinone can become darker (pseudoochronosis).  Should this occur, patient instructed to stop medication and call the office. The patient verbalized understanding of the proper use and possible adverse effects of hydroquinone.  All of the patient's questions and concerns were addressed.
Nsaids Pregnancy And Lactation Text: These medications are considered safe up to 30 weeks gestation. It is excreted in breast milk.
Cimzia Pregnancy And Lactation Text: This medication crosses the placenta but can be considered safe in certain situations. Cimzia may be excreted in breast milk.
Winlevi Counseling:  I discussed with the patient the risks of topical clascoterone including but not limited to erythema, scaling, itching, and stinging. Patient voiced their understanding.
Opioid Pregnancy And Lactation Text: These medications can lead to premature delivery and should be avoided during pregnancy. These medications are also present in breast milk in small amounts.
Soolantra Counseling: I discussed with the patients the risks of topial Soolantra. This is a medicine which decreases the number of mites and inflammation in the skin. You experience burning, stinging, eye irritation or allergic reactions.  Please call our office if you develop any problems from using this medication.
Albendazole Pregnancy And Lactation Text: This medication is Pregnancy Category C and it isn't known if it is safe during pregnancy. It is also excreted in breast milk.
Xeljanz Counseling: I discussed with the patient the risks of Xeljanz therapy including increased risk of infection, liver issues, headache, diarrhea, or cold symptoms. Live vaccines should be avoided. They were instructed to call if they have any problems.
Amzeeq Pregnancy And Lactation Text: It is not recommended to use the product if you are pregnant.
Use Enhanced Medication Counseling?: No
Dutasteride Female Counseling: Dutasteride Counseling:  I discussed with the patient the risks of use of dutasteride including but not limited to decreased libido and sexual dysfunction. Explained the teratogenic nature of the medication and stressed the importance of not getting pregnant during treatment. All of the patient's questions and concerns were addressed.
Gabapentin Counseling: I discussed with the patient the risks of gabapentin including but not limited to dizziness, somnolence, fatigue and ataxia.
Cosentyx Counseling:  I discussed with the patient the risks of Cosentyx including but not limited to worsening of Crohn's disease, immunosuppression, allergic reactions and infections.  The patient understands that monitoring is required including a PPD at baseline and must alert us or the primary physician if symptoms of infection or other concerning signs are noted.
Cantharidin Pregnancy And Lactation Text: This medication has not been proven safe during pregnancy. It is unknown if this medication is excreted in breast milk.
Azathioprine Counseling:  I discussed with the patient the risks of azathioprine including but not limited to myelosuppression, immunosuppression, hepatotoxicity, lymphoma, and infections.  The patient understands that monitoring is required including baseline LFTs, Creatinine, possible TPMP genotyping and weekly CBCs for the first month and then every 2 weeks thereafter.  The patient verbalized understanding of the proper use and possible adverse effects of azathioprine.  All of the patient's questions and concerns were addressed.
Winlevi Pregnancy And Lactation Text: This medication is considered safe during pregnancy and breastfeeding.
Minoxidil Counseling: Minoxidil is a topical medication which can increase blood flow where it is applied. It is uncertain how this medication increases hair growth. Side effects are uncommon and include stinging and allergic reactions.
Fluconazole Counseling:  Patient counseled regarding adverse effects of fluconazole including but not limited to headache, diarrhea, nausea, upset stomach, liver function test abnormalities, taste disturbance, and stomach pain.  There is a rare possibility of liver failure that can occur when taking fluconazole.  The patient understands that monitoring of LFTs and kidney function test may be required, especially at baseline. The patient verbalized understanding of the proper use and possible adverse effects of fluconazole.  All of the patient's questions and concerns were addressed.
Spevigo Counseling: I discussed with the patient the risks of Spevigo including but not limited to fatigue, nasuea, vomiting, headache, pruritus, urinary tract infection, an infusion related reactions.  The patient understands that monitoring is required including screening for tuberculosis at baseline and yearly screening thereafter while continuing Spevigo therapy. They should contact us if symptoms of infection or other concerning signs are noted.
Include Pregnancy/Lactation Warning?: Add Automatically Based on Childbearing Potential and Patient Age
Olanzapine Counseling- I discussed with the patient the common side effects of olanzapine including but are not limited to: lack of energy, dry mouth, increased appetite, sleepiness, tremor, constipation, dizziness, changes in behavior, or restlessness.  Explained that teenagers are more likely to experience headaches, abdominal pain, pain in the arms or legs, tiredness, and sleepiness.  Serious side effects include but are not limited: increased risk of death in elderly patients who are confused, have memory loss, or dementia-related psychosis; hyperglycemia; increased cholesterol and triglycerides; and weight gain.
Azelaic Acid Counseling: Patient counseled that medicine may cause skin irritation and to avoid applying near the eyes.  In the event of skin irritation, the patient was advised to reduce the amount of the drug applied or use it less frequently.   The patient verbalized understanding of the proper use and possible adverse effects of azelaic acid.  All of the patient's questions and concerns were addressed.
Xelpradeepz Pregnancy And Lactation Text: This medication is Pregnancy Category D and is not considered safe during pregnancy.  The risk during breast feeding is also uncertain.
Soolantra Pregnancy And Lactation Text: This medication is Pregnancy Category C. This medication is considered safe during breast feeding.
Ivermectin Counseling:  Patient instructed to take medication on an empty stomach with a full glass of water.  Patient informed of potential adverse effects including but not limited to nausea, diarrhea, dizziness, itching, and swelling of the extremities or lymph nodes.  The patient verbalized understanding of the proper use and possible adverse effects of ivermectin.  All of the patient's questions and concerns were addressed.
Hyrimoz Counseling:  I discussed with the patient the risks of adalimumab including but not limited to myelosuppression, immunosuppression, autoimmune hepatitis, demyelinating diseases, lymphoma, and serious infections.  The patient understands that monitoring is required including a PPD at baseline and must alert us or the primary physician if symptoms of infection or other concerning signs are noted.
Dutasteride Pregnancy And Lactation Text: This medication is absolutely contraindicated in women, especially during pregnancy and breast feeding. Feminization of male fetuses is possible if taking while pregnant.
Quinolones Counseling:  I discussed with the patient the risks of fluoroquinolones including but not limited to GI upset, allergic reaction, drug rash, diarrhea, dizziness, photosensitivity, yeast infections, liver function test abnormalities, tendonitis/tendon rupture.
VTAMA Counseling: I discussed with the patient that VTAMA is not for use in the eyes, mouth or mouth. They should call the office if they develop any signs of allergic reactions to VTAMA. The patient verbalized understanding of the proper use and possible adverse effects of VTAMA.  All of the patient's questions and concerns were addressed.
Cimetidine Counseling:  I discussed with the patient the risks of Cimetidine including but not limited to gynecomastia, headache, diarrhea, nausea, drowsiness, arrhythmias, pancreatitis, skin rashes, psychosis, bone marrow suppression and kidney toxicity.
Spevigo Pregnancy And Lactation Text: The risk during pregnancy and breastfeeding is uncertain with this medication. This medication does cross the placenta. It is unknown if this medication is found in breast milk.
Imiquimod Counseling:  I discussed with the patient the risks of imiquimod including but not limited to erythema, scaling, itching, weeping, crusting, and pain.  Patient understands that the inflammatory response to imiquimod is variable from person to person and was educated regarded proper titration schedule.  If flu-like symptoms develop, patient knows to discontinue the medication and contact us.
Olanzapine Pregnancy And Lactation Text: This medication is pregnancy category C.   There are no adequate and well controlled trials with olanzapine in pregnant females.  Olanzapine should be used during pregnancy only if the potential benefit justifies the potential risk to the fetus.   In a study in lactating healthy women, olanzapine was excreted in breast milk.  It is recommended that women taking olanzapine should not breast feed.
Erivedge Counseling- I discussed with the patient the risks of Erivedge including but not limited to nausea, vomiting, diarrhea, constipation, weight loss, changes in the sense of taste, decreased appetite, muscle spasms, and hair loss.  The patient verbalized understanding of the proper use and possible adverse effects of Erivedge.  All of the patient's questions and concerns were addressed.
Finasteride Male Counseling: Finasteride Counseling:  I discussed with the patient the risks of use of finasteride including but not limited to decreased libido, decreased ejaculate volume, gynecomastia, and depression. Women should not handle medication.  All of the patient's questions and concerns were addressed.
Glycopyrrolate Counseling:  I discussed with the patient the risks of glycopyrrolate including but not limited to skin rash, drowsiness, dry mouth, difficulty urinating, and blurred vision.
Topical Retinoid counseling:  Patient advised to apply a pea-sized amount only at bedtime and wait 30 minutes after washing their face before applying.  If too drying, patient may add a non-comedogenic moisturizer. The patient verbalized understanding of the proper use and possible adverse effects of retinoids.  All of the patient's questions and concerns were addressed.
Cellcept Counseling:  I discussed with the patient the risks of mycophenolate mofetil including but not limited to infection/immunosuppression, GI upset, hypokalemia, hypercholesterolemia, bone marrow suppression, lymphoproliferative disorders, malignancy, GI ulceration/bleed/perforation, colitis, interstitial lung disease, kidney failure, progressive multifocal leukoencephalopathy, and birth defects.  The patient understands that monitoring is required including a baseline creatinine and regular CBC testing. In addition, patient must alert us immediately if symptoms of infection or other concerning signs are noted.
Stelara Counseling:  I discussed with the patient the risks of ustekinumab including but not limited to immunosuppression, malignancy, posterior leukoencephalopathy syndrome, and serious infections.  The patient understands that monitoring is required including a PPD at baseline and must alert us or the primary physician if symptoms of infection or other concerning signs are noted.
Oral Minoxidil Counseling- I discussed with the patient the risks of oral minoxidil including but not limited to shortness of breath, swelling of the feet or ankles, dizziness, lightheadedness, unwanted hair growth and allergic reaction.  The patient verbalized understanding of the proper use and possible adverse effects of oral minoxidil.  All of the patient's questions and concerns were addressed.
Griseofulvin Counseling:  I discussed with the patient the risks of griseofulvin including but not limited to photosensitivity, cytopenia, liver damage, nausea/vomiting and severe allergy.  The patient understands that this medication is best absorbed when taken with a fatty meal (e.g., ice cream or french fries).
Mirvaso Counseling: Mirvaso is a topical medication which can decrease superficial blood flow where applied. Side effects are uncommon and include stinging, redness and allergic reactions.

## (undated) DEVICE — CHLORAPREP 26 ML APPLICATOR - ORANGE TINT(25/CA)

## (undated) DEVICE — BAG SPONGE COUNT 10.25 X 32 - BLUE (250/CA)

## (undated) DEVICE — BLADE SURGICAL CLIPPER - (50EA/CA)

## (undated) DEVICE — GOWN SURGEONS X-LARGE - DISP. (30/CA)

## (undated) DEVICE — SET LEADWIRE 5 LEAD BEDSIDE DISPOSABLE ECG (1SET OF 5/EA)

## (undated) DEVICE — DRESSING XEROFORM 1X8 - (50/BX 4BX/CA)

## (undated) DEVICE — ELECTRODE 850 FOAM ADHESIVE - HYDROGEL RADIOTRNSPRNT (50/PK)

## (undated) DEVICE — PACK MINOR BASIN - (2EA/CA)

## (undated) DEVICE — BLADE SURGICAL #11 - (50/BX)

## (undated) DEVICE — SUTURE CV

## (undated) DEVICE — ELECTRODE DUAL RETURN W/ CORD - (50/PK)

## (undated) DEVICE — DRAPE STRLE REG TOWEL 18X24 - (10/BX 4BX/CA)"

## (undated) DEVICE — DRAPE CLEAR W/ELASTIC BAND RAD CARM 40 X40" (20EA/CA)"

## (undated) DEVICE — SODIUM CHL IRRIGATION 0.9% 1000ML (12EA/CA)

## (undated) DEVICE — DRESSING NON ADHERENT 3 X 4 - STERILE (100/BX 12BX/CA)

## (undated) DEVICE — DRAPE IOBAN II INCISE 23X17 - (10EA/BX 4BX/CA)

## (undated) DEVICE — KIT ANESTHESIA W/CIRCUIT & 3/LT BAG W/FILTER (20EA/CA)

## (undated) DEVICE — COVER MAYO STAND X-LG - (22EA/CA)

## (undated) DEVICE — SET EXTENSION WITH 2 PORTS (48EA/CA) ***PART #2C8610 IS A SUBSTITUTE*****

## (undated) DEVICE — SYRINGE SAFETY 5 ML 18 GA X 1-1/2 BLUNT LL (100/BX 4BX/CA)

## (undated) DEVICE — SENSOR SPO2 NEO LNCS ADHESIVE (20/BX) SEE USER NOTES

## (undated) DEVICE — LACTATED RINGERS INJ 1000 ML - (14EA/CA 60CA/PF)

## (undated) DEVICE — HEAD HOLDER JUNIOR/ADULT

## (undated) DEVICE — GLOVE SZ 7.5 BIOGEL PI MICRO - PF LF (50PR/BX)

## (undated) DEVICE — TOWEL STOP TIMEOUT SAFETY FLAG (40EA/CA)

## (undated) DEVICE — TUBE E-T HI-LO CUFF 7.5MM (10EA/PK)

## (undated) DEVICE — DRAPE LAPAROTOMY T SHEET - (12EA/CA)

## (undated) DEVICE — SOD. CHL. INJ. 0.9% 250 ML - (36/CA 50CA/PF)

## (undated) DEVICE — PROTECTOR ULNA NERVE - (36PR/CA)

## (undated) DEVICE — DRESSING TRANSPARENT FILM TEGADERM 4 X 4.75" (50EA/BX)"

## (undated) DEVICE — SUTURE 3-0 VICRYL PLUS RB-1 - 8 X 18 INCH (12/BX)

## (undated) DEVICE — GOWN SURGEONS LARGE - (32/CA)

## (undated) DEVICE — CONTAINER SPECIMEN BAG OR - STERILE 4 OZ W/LID (100EA/CA)

## (undated) DEVICE — SUCTION INSTRUMENT YANKAUER BULBOUS TIP W/O VENT (50EA/CA)

## (undated) DEVICE — SUTURE 2-0 VICRYL PLUS SH - 8 X 18 INCH (12/BX)

## (undated) DEVICE — SHEET THYROID - (10EA/CA)

## (undated) DEVICE — GLOVE BIOGEL PI INDICATOR SZ 6.5 SURGICAL PF LF - (50/BX 4BX/CA)

## (undated) DEVICE — GLOVE SZ 6.5 BIOGEL PI MICRO - PF LF (50PR/BX)

## (undated) DEVICE — BOVIE BLADE COATED - (50/PK)

## (undated) DEVICE — CANISTER SUCTION 3000ML MECHANICAL FILTER AUTO SHUTOFF MEDI-VAC NONSTERILE LF DISP  (40EA/CA)

## (undated) DEVICE — DRAPE LARGE 3 QUARTER - (20/CA)

## (undated) DEVICE — SLEEVE, VASO, THIGH, MED

## (undated) DEVICE — GLOVE BIOGEL INDICATOR SZ 7SURGICAL PF LTX - (50/BX 4BX/CA)

## (undated) DEVICE — CLIP MED INTNL HRZN TI ESCP - (25/BX)

## (undated) DEVICE — SUTURE 2-0 ETHIBOND GREEN CT-2 TAPER (36PK/BX)

## (undated) DEVICE — NEPTUNE 4 PORT MANIFOLD - (20/PK)

## (undated) DEVICE — DRESSING NON-ADHERING 8 X 3 - (50/BX)

## (undated) DEVICE — SUTURE 4-0 MONOCRYL PLUS PS-2 - 27 INCH (36/BX)

## (undated) DEVICE — SUTURE 4-0 SILK 12 X 18 INCH - (36/BX)

## (undated) DEVICE — GLOVE BIOGEL SZ 7 SURGICAL PF LTX - (50PR/BX 4BX/CA)

## (undated) DEVICE — CLIP SM INTNL HRZN TI ESCP LGT - (24EA/PK 25PK/BX)

## (undated) DEVICE — SUTURE 3-0 SILK 12 X 18 IN - (36/BX)

## (undated) DEVICE — DRAPE LG. APERTURE 33 X 51" - (10EA/BX)"

## (undated) DEVICE — GLOVE BIOGEL SZ 7.5 SURGICAL PF LTX - (50PR/BX 4BX/CA)

## (undated) DEVICE — DERMABOND ADVANCED - (12EA/BX)

## (undated) DEVICE — TUBING CLEARLINK DUO-VENT - C-FLO (48EA/CA)

## (undated) DEVICE — SUTURE GENERAL

## (undated) DEVICE — MASK ANESTHESIA ADULT  - (100/CA)

## (undated) DEVICE — SUTURE 0 VICRYL PLUS CT-2 - 8 X 18 INCH (12/BX)

## (undated) DEVICE — GOWN WARMING STANDARD FLEX - (30/CA)

## (undated) DEVICE — BLADE SURGICAL #15 - (50/BX 3BX/CA)

## (undated) DEVICE — SUTURE 2-0 SILK 12 X 18" (36PK/BX)"

## (undated) DEVICE — ELECTRODE NEEDLE NON-SAFETY 2.8 IN (150EA/CT)